# Patient Record
Sex: FEMALE | Race: BLACK OR AFRICAN AMERICAN | Employment: OTHER | URBAN - METROPOLITAN AREA
[De-identification: names, ages, dates, MRNs, and addresses within clinical notes are randomized per-mention and may not be internally consistent; named-entity substitution may affect disease eponyms.]

---

## 2017-01-30 ENCOUNTER — TELEPHONE (OUTPATIENT)
Dept: INTERNAL MEDICINE CLINIC | Facility: CLINIC | Age: 77
End: 2017-01-30

## 2017-01-30 RX ORDER — ATENOLOL 50 MG/1
50 TABLET ORAL DAILY
Qty: 90 TABLET | Refills: 0 | Status: SHIPPED | OUTPATIENT
Start: 2017-01-30 | End: 2017-05-01

## 2017-02-06 ENCOUNTER — TELEPHONE (OUTPATIENT)
Dept: INTERNAL MEDICINE CLINIC | Facility: CLINIC | Age: 77
End: 2017-02-06

## 2017-02-06 RX ORDER — LEVOTHYROXINE SODIUM 0.12 MG/1
125 TABLET ORAL
Qty: 90 TABLET | Refills: 1 | Status: SHIPPED | OUTPATIENT
Start: 2017-02-06 | End: 2017-03-01

## 2017-02-06 RX ORDER — METOCLOPRAMIDE 10 MG/1
TABLET ORAL
Qty: 180 TABLET | Refills: 0 | Status: SHIPPED | OUTPATIENT
Start: 2017-02-06 | End: 2017-02-09 | Stop reason: ALTCHOICE

## 2017-02-06 NOTE — TELEPHONE ENCOUNTER
•  Levothyroxine Sodium 125 MCG Oral Tab, Take 1 tablet (125 mcg total) by mouth before breakfast., Disp: 90 tablet, Rfl: 1  •  •  Metoclopramide HCl 10 MG Oral Tab, TAKE 1 TABLET(10 MG) BY MOUTH TWICE DAILY, Disp: 180 tablet, Rfl: 0  •

## 2017-02-07 ENCOUNTER — TELEPHONE (OUTPATIENT)
Dept: INTERNAL MEDICINE CLINIC | Facility: CLINIC | Age: 77
End: 2017-02-07

## 2017-02-09 ENCOUNTER — OFFICE VISIT (OUTPATIENT)
Dept: INTERNAL MEDICINE CLINIC | Facility: CLINIC | Age: 77
End: 2017-02-09

## 2017-02-09 VITALS
SYSTOLIC BLOOD PRESSURE: 120 MMHG | OXYGEN SATURATION: 96 % | WEIGHT: 159 LBS | TEMPERATURE: 99 F | HEART RATE: 84 BPM | HEIGHT: 64 IN | BODY MASS INDEX: 27.14 KG/M2 | DIASTOLIC BLOOD PRESSURE: 62 MMHG

## 2017-02-09 DIAGNOSIS — E78.2 COMBINED HYPERLIPIDEMIA: ICD-10-CM

## 2017-02-09 DIAGNOSIS — M79.672 LEFT FOOT PAIN: ICD-10-CM

## 2017-02-09 DIAGNOSIS — E89.0 POSTOPERATIVE HYPOTHYROIDISM: ICD-10-CM

## 2017-02-09 DIAGNOSIS — E55.9 VITAMIN D DEFICIENCY: ICD-10-CM

## 2017-02-09 DIAGNOSIS — E53.8 VITAMIN B 12 DEFICIENCY: ICD-10-CM

## 2017-02-09 DIAGNOSIS — I10 ESSENTIAL HYPERTENSION WITH GOAL BLOOD PRESSURE LESS THAN 130/80: Primary | ICD-10-CM

## 2017-02-09 LAB
ALBUMIN SERPL BCP-MCNC: 3.3 G/DL (ref 3.5–4.8)
ALBUMIN/GLOB SERPL: 0.8 {RATIO} (ref 1–2)
ALP SERPL-CCNC: 123 U/L (ref 32–100)
ALT SERPL-CCNC: 10 U/L (ref 14–54)
ANION GAP SERPL CALC-SCNC: 12 MMOL/L (ref 0–18)
AST SERPL-CCNC: 14 U/L (ref 15–41)
BASOPHILS # BLD: 0.1 K/UL (ref 0–0.2)
BASOPHILS NFR BLD: 1 %
BILIRUB SERPL-MCNC: 0.5 MG/DL (ref 0.3–1.2)
BUN SERPL-MCNC: 10 MG/DL (ref 8–20)
BUN/CREAT SERPL: 8.8 (ref 10–20)
CALCIUM SERPL-MCNC: 9.6 MG/DL (ref 8.5–10.5)
CHLORIDE SERPL-SCNC: 98 MMOL/L (ref 95–110)
CO2 SERPL-SCNC: 31 MMOL/L (ref 22–32)
CREAT SERPL-MCNC: 1.14 MG/DL (ref 0.5–1.5)
EOSINOPHIL # BLD: 0.1 K/UL (ref 0–0.7)
EOSINOPHIL NFR BLD: 2 %
ERYTHROCYTE [DISTWIDTH] IN BLOOD BY AUTOMATED COUNT: 14.6 % (ref 11–15)
ERYTHROCYTE [SEDIMENTATION RATE] IN BLOOD: 52 MM/HR (ref 0–30)
GLOBULIN PLAS-MCNC: 3.9 G/DL (ref 2.5–3.7)
GLUCOSE SERPL-MCNC: 85 MG/DL (ref 70–99)
HCT VFR BLD AUTO: 37.9 % (ref 35–48)
HGB BLD-MCNC: 12.4 G/DL (ref 12–16)
LYMPHOCYTES # BLD: 1.5 K/UL (ref 1–4)
LYMPHOCYTES NFR BLD: 20 %
MCH RBC QN AUTO: 28 PG (ref 27–32)
MCHC RBC AUTO-ENTMCNC: 32.8 G/DL (ref 32–37)
MCV RBC AUTO: 85.4 FL (ref 80–100)
MONOCYTES # BLD: 0.4 K/UL (ref 0–1)
MONOCYTES NFR BLD: 5 %
NEUTROPHILS # BLD AUTO: 5.5 K/UL (ref 1.8–7.7)
NEUTROPHILS NFR BLD: 73 %
OSMOLALITY UR CALC.SUM OF ELEC: 290 MOSM/KG (ref 275–295)
PLATELET # BLD AUTO: 230 K/UL (ref 140–400)
PMV BLD AUTO: 9.3 FL (ref 7.4–10.3)
POTASSIUM SERPL-SCNC: 3.3 MMOL/L (ref 3.3–5.1)
PROT SERPL-MCNC: 7.2 G/DL (ref 5.9–8.4)
RBC # BLD AUTO: 4.44 M/UL (ref 3.7–5.4)
SODIUM SERPL-SCNC: 141 MMOL/L (ref 136–144)
TSH SERPL-ACNC: 0.4 UIU/ML (ref 0.34–5.6)
URATE SERPL-MCNC: 8.9 MG/DL (ref 2.1–7.4)
VIT B12 SERPL-MCNC: 308 PG/ML (ref 181–914)
WBC # BLD AUTO: 7.6 K/UL (ref 4–11)

## 2017-02-09 PROCEDURE — 99214 OFFICE O/P EST MOD 30 MIN: CPT | Performed by: INTERNAL MEDICINE

## 2017-02-09 PROCEDURE — 36415 COLL VENOUS BLD VENIPUNCTURE: CPT | Performed by: INTERNAL MEDICINE

## 2017-02-09 PROCEDURE — G0463 HOSPITAL OUTPT CLINIC VISIT: HCPCS | Performed by: INTERNAL MEDICINE

## 2017-02-09 RX ORDER — HYDROCHLOROTHIAZIDE 12.5 MG/1
CAPSULE, GELATIN COATED ORAL
Qty: 90 CAPSULE | Refills: 0 | OUTPATIENT
Start: 2017-02-09 | End: 2017-02-09

## 2017-02-09 RX ORDER — HYDROCHLOROTHIAZIDE 12.5 MG/1
CAPSULE, GELATIN COATED ORAL
Qty: 90 CAPSULE | Refills: 1 | Status: SHIPPED | OUTPATIENT
Start: 2017-02-09 | End: 2017-02-09

## 2017-02-09 RX ORDER — FLUOXETINE HYDROCHLORIDE 20 MG/1
20 CAPSULE ORAL NIGHTLY
Qty: 30 CAPSULE | Refills: 2 | Status: SHIPPED | OUTPATIENT
Start: 2017-02-09 | End: 2017-04-10 | Stop reason: ALTCHOICE

## 2017-02-09 RX ORDER — HYDROCHLOROTHIAZIDE 12.5 MG/1
CAPSULE, GELATIN COATED ORAL
Qty: 90 CAPSULE | Refills: 1 | Status: SHIPPED | OUTPATIENT
Start: 2017-02-09 | End: 2017-09-25

## 2017-02-09 NOTE — PATIENT INSTRUCTIONS
ASSESSMENT/PLAN:   Essential hypertension with goal blood pressure less than 130/80  (primary encounter diagnosis)Good control. Careful with diet and excercise at least 30 minutes 3-4 times a week.  Check blood pressures at different times on different days the other guidelines your healthcare provider has given you. Foods to limit  Eating too many foods containing purines may increase the levels of uric acid in your body and increase your risk for a gout attack.  It may be best to limit these high-purine pancho professional medical care. Always follow your healthcare professional's instructions. Uric Acid (Synovial Fluid)  Does this test have other names?   Synovial fluid analysis   What is this test?  The uric acid test measures levels of uric acid that ca order blood and urine tests to measure uric acid levels. Higher than normal levels of uric acid in the blood or urine can suggest gout.  But the only way your provider can diagnose the condition for sure is by measuring the levels of uric acid in your synov healthcare provider about what to do before having this test. You may need to avoid eating, drinking, or taking certain medicines on the day of the test. Be sure your provider knows about all medicines, herbs, vitamins, and supplements you are taking.  This

## 2017-02-09 NOTE — PROGRESS NOTES
HPI:    Patient ID: Constantine Kim is a 78year old female. HPI Comments: Insomnia. Goes to bed at 10 PM. Sleeps at 12-1 AM. Awakens in 2 hrs. Get book and reading. Not fall asleep. No snoring and no pain. Tried melatonin and not help.      Foot Pain   The Constitutional: Negative for fever, chills, diaphoresis, activity change, appetite change, fatigue and unexpected weight change. Respiratory: Negative for apnea, cough, choking, chest tightness, shortness of breath, wheezing and stridor.     Lalo Jackson [DISCONTINUED] hydrochlorothiazide 12.5 MG Oral Cap TAKE ONE CAPSULE BY MOUTH EACH MORNING Disp: 90 capsule Rfl: 0   [DISCONTINUED] hydrochlorothiazide 12.5 MG Oral Cap TAKE ONE CAPSULE BY MOUTH EACH MORNING Disp: 90 capsule Rfl: 1   [DISCONTINUED] hydroch Dorsalis pedis pulses are 2+ on the right side, and 2+ on the left side. Posterior tibial pulses are 2+ on the right side, and 2+ on the left side. Pulmonary/Chest: Effort normal and breath sounds normal. No accessory muscle usage or stridor. Postoperative hypothyroidism Hot flashes like had prior. Check blood. Vitamin d deficiency Stable 11-16. Combined hyperlipidemia Check blood. Left foot pain Medial L joint. ? Gout. Check blood. Low purine diet. Improving.  Hold stopping HCTZ per

## 2017-02-14 NOTE — PROGRESS NOTES
Quick Note:    CMP Normal (electrolytes, sugar and liver functions), finding kidney function is back to baseline kidney function. Wu phosphatase is still elevated. Recommend add on a GGT. Lipid (choilesterol) is good,   Thyroid is good.    Uric acid is

## 2017-02-15 RX ORDER — ALLOPURINOL 300 MG/1
300 TABLET ORAL DAILY
Qty: 30 TABLET | Refills: 3 | Status: SHIPPED | OUTPATIENT
Start: 2017-02-15 | End: 2017-09-25

## 2017-03-01 ENCOUNTER — TELEPHONE (OUTPATIENT)
Dept: INTERNAL MEDICINE CLINIC | Facility: CLINIC | Age: 77
End: 2017-03-01

## 2017-03-01 RX ORDER — PANTOPRAZOLE SODIUM 40 MG/1
TABLET, DELAYED RELEASE ORAL
Qty: 90 TABLET | Refills: 1 | Status: SHIPPED | OUTPATIENT
Start: 2017-03-01 | End: 2017-09-25

## 2017-03-01 RX ORDER — LEVOTHYROXINE SODIUM 0.12 MG/1
125 TABLET ORAL
Qty: 90 TABLET | Refills: 1 | Status: SHIPPED | OUTPATIENT
Start: 2017-03-01 | End: 2017-05-30

## 2017-03-01 NOTE — TELEPHONE ENCOUNTER
She also want to know if she has to continue all this medications         •  Levothyroxine Sodium 125 MCG Oral Tab, Take 1 tablet (125 mcg total) by mouth before breakfast., Disp: 90 tablet, Rfl: 1- [facility] pharmacy        Pantoprazole Sodium 40 MG Oral Ta

## 2017-03-01 NOTE — TELEPHONE ENCOUNTER
Can try stopping metoclopramide. Then wait 1 week. Try to decrease pantaprozole at qod and then q2 days if can. Rx. Sent to [facility] in [facility] for protonix and for thyroid meds. To [facility].

## 2017-03-02 NOTE — TELEPHONE ENCOUNTER
Called and spoke to patient. Notified of Dr. Wai Hammonds. Patient verbalized understanding and compliance to future plan of care. Patient had no questions at time of call.

## 2017-04-10 ENCOUNTER — TELEPHONE (OUTPATIENT)
Dept: INTERNAL MEDICINE CLINIC | Facility: CLINIC | Age: 77
End: 2017-04-10

## 2017-04-10 ENCOUNTER — OFFICE VISIT (OUTPATIENT)
Dept: INTERNAL MEDICINE CLINIC | Facility: CLINIC | Age: 77
End: 2017-04-10

## 2017-04-10 VITALS
WEIGHT: 152.81 LBS | SYSTOLIC BLOOD PRESSURE: 120 MMHG | HEIGHT: 64 IN | TEMPERATURE: 98 F | HEART RATE: 70 BPM | BODY MASS INDEX: 26.09 KG/M2 | OXYGEN SATURATION: 92 % | DIASTOLIC BLOOD PRESSURE: 56 MMHG

## 2017-04-10 DIAGNOSIS — M10.9 GOUT, UNSPECIFIED CAUSE, UNSPECIFIED CHRONICITY, UNSPECIFIED SITE: ICD-10-CM

## 2017-04-10 DIAGNOSIS — E89.0 POSTOPERATIVE HYPOTHYROIDISM: ICD-10-CM

## 2017-04-10 DIAGNOSIS — R50.9 FEVER, UNSPECIFIED FEVER CAUSE: ICD-10-CM

## 2017-04-10 DIAGNOSIS — E55.9 VITAMIN D DEFICIENCY: ICD-10-CM

## 2017-04-10 DIAGNOSIS — R11.0 NAUSEA: ICD-10-CM

## 2017-04-10 DIAGNOSIS — E78.2 COMBINED HYPERLIPIDEMIA: ICD-10-CM

## 2017-04-10 DIAGNOSIS — I10 ESSENTIAL HYPERTENSION WITH GOAL BLOOD PRESSURE LESS THAN 130/80: Primary | ICD-10-CM

## 2017-04-10 DIAGNOSIS — R82.90 ABNORMAL URINE: ICD-10-CM

## 2017-04-10 DIAGNOSIS — G47.00 INSOMNIA, UNSPECIFIED TYPE: ICD-10-CM

## 2017-04-10 DIAGNOSIS — E53.8 VITAMIN B 12 DEFICIENCY: ICD-10-CM

## 2017-04-10 PROCEDURE — G0463 HOSPITAL OUTPT CLINIC VISIT: HCPCS | Performed by: INTERNAL MEDICINE

## 2017-04-10 PROCEDURE — 81003 URINALYSIS AUTO W/O SCOPE: CPT | Performed by: INTERNAL MEDICINE

## 2017-04-10 PROCEDURE — 36415 COLL VENOUS BLD VENIPUNCTURE: CPT | Performed by: INTERNAL MEDICINE

## 2017-04-10 PROCEDURE — 99215 OFFICE O/P EST HI 40 MIN: CPT | Performed by: INTERNAL MEDICINE

## 2017-04-10 RX ORDER — ONDANSETRON 4 MG/1
4 TABLET, FILM COATED ORAL EVERY 8 HOURS PRN
Qty: 20 TABLET | Refills: 0 | Status: SHIPPED | OUTPATIENT
Start: 2017-04-10 | End: 2017-04-21 | Stop reason: ALTCHOICE

## 2017-04-10 RX ORDER — OSELTAMIVIR PHOSPHATE 75 MG/1
75 CAPSULE ORAL 2 TIMES DAILY
Qty: 10 CAPSULE | Refills: 0 | Status: SHIPPED | OUTPATIENT
Start: 2017-04-10 | End: 2017-04-10 | Stop reason: CLARIF

## 2017-04-10 RX ORDER — ZOLPIDEM TARTRATE 10 MG/1
10 TABLET ORAL NIGHTLY PRN
Qty: 20 TABLET | Refills: 0 | Status: SHIPPED | OUTPATIENT
Start: 2017-04-10 | End: 2018-04-05

## 2017-04-10 RX ORDER — ESCITALOPRAM OXALATE 10 MG/1
10 TABLET ORAL NIGHTLY
Qty: 30 TABLET | Refills: 1 | Status: SHIPPED | OUTPATIENT
Start: 2017-04-10 | End: 2017-04-10

## 2017-04-10 RX ORDER — ESCITALOPRAM OXALATE 10 MG/1
10 TABLET ORAL NIGHTLY
Qty: 90 TABLET | Refills: 0 | Status: SHIPPED | OUTPATIENT
Start: 2017-04-10 | End: 2017-07-28

## 2017-04-10 NOTE — PROGRESS NOTES
HPI:    Patient ID: Dee Reyes is a 78year old female. HPI Comments: No travel. No bites nor rashes. Fever yesterday 100. Denies URI Sx. Sweats last night. Ear Pain   There is pain in the left ear. This is a new problem.  The current episode star Anxiety  This is a new problem. The current episode started more than 1 month ago (Saw PCP 4 yrs Refused counselor. ). Associated symptoms include abdominal pain and nausea.  Pertinent negatives include no change in bowel habit, chest pain, chills, conges pressure, sneezing, sore throat, tinnitus, trouble swallowing and voice change. Eyes: Negative for photophobia, pain, discharge, redness, itching and visual disturbance.    Respiratory: Negative for apnea, cough, choking, chest tightness, shortness of br Disp: 10 capsule Rfl: 0   Levothyroxine Sodium 125 MCG Oral Tab Take 1 tablet (125 mcg total) by mouth before breakfast. Disp: 90 tablet Rfl: 1   Pantoprazole Sodium 40 MG Oral Tab EC TAKE 1 TABLET BY MOUTH EVERY MORNING BEFORE BREAKFAST PRN Disp: 90 table Alcohol Use: Yes                Comment: occ       PHYSICAL EXAM:    Physical Exam   Constitutional: She is oriented to person, place, and time. She appears well-developed and well-nourished. No distress.    HENT:   Right Ear: Tympanic membrane, external ea thyromegaly present. Cardiovascular: Normal rate, regular rhythm, S1 normal, S2 normal, normal heart sounds, intact distal pulses and normal pulses. Pulses:       Carotid pulses are 2+ on the right side, and 2+ on the left side.        Radial pulses ar Coordination and gait normal.   Skin: Skin is warm and dry. She is not diaphoretic. Psychiatric: She has a normal mood and affect. Her speech is normal and behavior is normal. Judgment and thought content normal.   Nursing note and vitals reviewed. total) by mouth nightly. Zolpidem Tartrate 10 MG Oral Tab 20 tablet 0      Sig: Take 1 tablet (10 mg total) by mouth nightly as needed for Sleep.       Ondansetron HCl (ZOFRAN) 4 mg tablet 20 tablet 0      Sig: Take 1 tablet (4 mg total) by mouth every

## 2017-04-10 NOTE — TELEPHONE ENCOUNTER
Left ear ringing and hurting. Nausea and stomach problem. Patient states no fever today but did have one last night.   Scheduled same day appointment at 1 pm.

## 2017-04-10 NOTE — PATIENT INSTRUCTIONS
ASSESSMENT/PLAN:   Essential hypertension with goal blood pressure less than 130/80  (primary encounter diagnosis) Good control. Careful with diet and excercise at least 30 minutes 3-4 times a week.  Check blood pressures at different times on different day (eight) hours as needed for Nausea. Oseltamivir Phosphate 75 MG Oral Cap 10 capsule 0      Sig: Take 1 capsule (75 mg total) by mouth 2 (two) times daily.            Imaging & Referrals:  XR CHEST PA + LAT CHEST (CPT=[id number])

## 2017-04-12 DIAGNOSIS — E87.6 LOW BLOOD POTASSIUM: Primary | ICD-10-CM

## 2017-04-12 NOTE — PROGRESS NOTES
Quick Note:    CBC Normal (white blood cells and red blood cells and platelets),   CMP Normal (electrolytes, sugar, kidney and liver functions), potassium is very low.  Would recommend potassium 40 mEq twice a day and check basic metabolic at the end of thi

## 2017-04-17 ENCOUNTER — TELEPHONE (OUTPATIENT)
Dept: INTERNAL MEDICINE CLINIC | Facility: CLINIC | Age: 77
End: 2017-04-17

## 2017-04-17 NOTE — TELEPHONE ENCOUNTER
Received form from [employer] regarding Rx for Odansetron. Completed form and pending Dr. Edwina Porter review and ok to fax. Placed on Dr. Edwina Porter desk.

## 2017-04-19 ENCOUNTER — TELEPHONE (OUTPATIENT)
Dept: INTERNAL MEDICINE CLINIC | Facility: CLINIC | Age: 77
End: 2017-04-19

## 2017-04-19 RX ORDER — PROMETHAZINE HYDROCHLORIDE 25 MG/1
25 TABLET ORAL EVERY 8 HOURS PRN
Qty: 30 TABLET | Refills: 0 | Status: SHIPPED | OUTPATIENT
Start: 2017-04-19 | End: 2017-04-21 | Stop reason: ALTCHOICE

## 2017-04-20 NOTE — TELEPHONE ENCOUNTER
Rx has been faxed to maurice in [facility], pt informed.

## 2017-04-21 RX ORDER — PROCHLORPERAZINE MALEATE 10 MG
10 TABLET ORAL EVERY 8 HOURS PRN
Qty: 20 TABLET | Refills: 0 | OUTPATIENT
Start: 2017-04-21 | End: 2017-04-24

## 2017-04-21 NOTE — TELEPHONE ENCOUNTER
Visit can be closed only when message is complete.   The only other option would be to try Compazine 10 p.o. q. 8 as needed we have been through already to antinausea medications and neither one is covered so I am not sure what to do with insurance and I wa

## 2017-04-24 RX ORDER — PROCHLORPERAZINE MALEATE 10 MG
10 TABLET ORAL EVERY 8 HOURS PRN
Qty: 300 TABLET | Refills: 0 | Status: ON HOLD | OUTPATIENT
Start: 2017-04-24 | End: 2020-02-06

## 2017-05-01 RX ORDER — BENAZEPRIL HYDROCHLORIDE 40 MG/1
TABLET, FILM COATED ORAL
Qty: 180 TABLET | Refills: 0 | Status: SHIPPED | OUTPATIENT
Start: 2017-05-01 | End: 2017-09-25

## 2017-05-01 RX ORDER — ATENOLOL 50 MG/1
50 TABLET ORAL DAILY
Qty: 90 TABLET | Refills: 0 | Status: SHIPPED | OUTPATIENT
Start: 2017-05-01 | End: 2017-07-31

## 2017-05-30 ENCOUNTER — TELEPHONE (OUTPATIENT)
Dept: INTERNAL MEDICINE CLINIC | Facility: CLINIC | Age: 77
End: 2017-05-30

## 2017-05-30 RX ORDER — LEVOTHYROXINE SODIUM 0.12 MG/1
125 TABLET ORAL
Qty: 90 TABLET | Refills: 0 | Status: SHIPPED | OUTPATIENT
Start: 2017-05-30 | End: 2017-08-28

## 2017-05-30 NOTE — TELEPHONE ENCOUNTER
Needs refill     Current outpatient prescriptions:   •  Levothyroxine Sodium 125 MCG Oral Tab, Take 1 tablet (125 mcg total) by mouth before breakfast., Disp: 90 tablet, Rfl: 1  •

## 2017-06-07 ENCOUNTER — OFFICE VISIT (OUTPATIENT)
Dept: INTERNAL MEDICINE CLINIC | Facility: CLINIC | Age: 77
End: 2017-06-07

## 2017-06-07 VITALS
BODY MASS INDEX: 25.61 KG/M2 | WEIGHT: 150 LBS | HEART RATE: 77 BPM | TEMPERATURE: 99 F | DIASTOLIC BLOOD PRESSURE: 65 MMHG | SYSTOLIC BLOOD PRESSURE: 102 MMHG | HEIGHT: 64 IN

## 2017-06-07 DIAGNOSIS — R27.0 ATAXIA: Primary | ICD-10-CM

## 2017-06-07 DIAGNOSIS — R25.2 JERKING MOVEMENTS OF EXTREMITIES: ICD-10-CM

## 2017-06-07 PROCEDURE — 99213 OFFICE O/P EST LOW 20 MIN: CPT | Performed by: INTERNAL MEDICINE

## 2017-06-07 PROCEDURE — G0463 HOSPITAL OUTPT CLINIC VISIT: HCPCS | Performed by: INTERNAL MEDICINE

## 2017-06-07 NOTE — PROGRESS NOTES
HPI:    Patient ID: Dyllan Ashford is a 78year old female. HPI she came in today complaining of a falls . She states that since April on and off she has  jerking movement on her left leg and then she fels on the ground.  She denies any seizure-like activit breakfast. Disp: 90 tablet Rfl: 0   BENAZEPRIL HCL 40 MG Oral Tab TAKE 1 TABLET(40 MG) BY MOUTH TWICE DAILY Disp: 180 tablet Rfl: 0   atenolol 50 MG Oral Tab Take 1 tablet (50 mg total) by mouth daily.  Disp: 90 tablet Rfl: 0   Prochlorperazine Maleate (COM Onset   • Hypertension Mother    • Heart Disease Mother 36     CAD S/P MI.    • Cancer Brother      Lung cancer. Construction.        Social History:   Smoking Status: Never Smoker                      Smokeless Status: Never Used                        Alc There is no hepatosplenomegaly. There is no tenderness. There is no rigidity, no rebound, no guarding and no CVA tenderness. Musculoskeletal: Normal range of motion. She exhibits no edema. Lymphadenopathy:     She has no cervical adenopathy.      She ha

## 2017-06-07 NOTE — PATIENT INSTRUCTIONS
Jerking movements of extremities-  Left leg , not able to controll,etiology unclear , ? Seizure - previous ct shows small lacunar infarcts, will order ct head , eeg ,will refer her to neurology advised her not to drive until then,if happens again, go to er

## 2017-06-12 ENCOUNTER — HOSPITAL ENCOUNTER (OUTPATIENT)
Dept: CT IMAGING | Facility: HOSPITAL | Age: 77
Discharge: HOME | End: 2017-06-12
Attending: INTERNAL MEDICINE
Payer: MEDICARE

## 2017-06-12 DIAGNOSIS — R27.0 ATAXIA: ICD-10-CM

## 2017-06-12 PROCEDURE — 70450 CT HEAD/BRAIN W/O DYE: CPT | Performed by: INTERNAL MEDICINE

## 2017-06-26 ENCOUNTER — TELEPHONE (OUTPATIENT)
Dept: INTERNAL MEDICINE CLINIC | Facility: CLINIC | Age: 77
End: 2017-06-26

## 2017-06-26 NOTE — TELEPHONE ENCOUNTER
Daughter states pt is shaking a lot, has to hold herself up, losing balance. Asking to speak with a nurse.

## 2017-06-27 NOTE — TELEPHONE ENCOUNTER
Spoke with patient's son Kalyani Maurice states patient has been having what he thinks are seizures. Patient shaking a lot like a seizure and then she falls. Keisha Mcdaniel this is not new has been going on for a couple months. Saw Dr. Carly Connors for this complaint.   Had CT sc

## 2017-06-27 NOTE — TELEPHONE ENCOUNTER
/65. With dizziness today. Aware pcp is not in the office anymore, advised daughter that if symptoms get worse to take patient to ER. Daughter verbalized understanding.

## 2017-06-27 NOTE — TELEPHONE ENCOUNTER
Spoke with Jd Garcia, stated that loss of balance and shaking a lot has been going on for a while now but worse today. Does not have any appetite and does not eat much.  Per daughter patient approximately drinks 24 oz of water/day and drinks more soda than [place]

## 2017-06-28 ENCOUNTER — OFFICE VISIT (OUTPATIENT)
Dept: INTERNAL MEDICINE CLINIC | Facility: CLINIC | Age: 77
End: 2017-06-28

## 2017-06-28 VITALS
SYSTOLIC BLOOD PRESSURE: 95 MMHG | DIASTOLIC BLOOD PRESSURE: 61 MMHG | HEIGHT: 64 IN | BODY MASS INDEX: 25.44 KG/M2 | HEART RATE: 77 BPM | WEIGHT: 149 LBS | TEMPERATURE: 100 F

## 2017-06-28 DIAGNOSIS — R25.2 JERKING MOVEMENTS OF EXTREMITIES: Primary | ICD-10-CM

## 2017-06-28 DIAGNOSIS — R42 DIZZINESS: ICD-10-CM

## 2017-06-28 PROCEDURE — G0463 HOSPITAL OUTPT CLINIC VISIT: HCPCS | Performed by: INTERNAL MEDICINE

## 2017-06-28 PROCEDURE — 99213 OFFICE O/P EST LOW 20 MIN: CPT | Performed by: INTERNAL MEDICINE

## 2017-06-28 NOTE — PATIENT INSTRUCTIONS
erking movements of extremities  (primary encounter diagnosis) ? Partial seizure , ct reviewed , chronic lacunar infarcts, - will order mri of the brain , eeg , carotid doppler, she has edward with neurology on Friday . If any change in mental status, loss of

## 2017-06-28 NOTE — TELEPHONE ENCOUNTER
Spoke with son and he was informed that pt needs to be seen for evaluation, offered appt with Dr. Perla Silva but he refused since Dr. Celina Padron knows pt's history. He is insisting Dr. Celina Padron see pt. When can we book pt for?

## 2017-06-28 NOTE — PROGRESS NOTES
HPI:    Patient ID: Maryuri Corona is a 78year old female. HPI she came I today with complains of  Jerking movent of her leg and fall .  She states that thus Is ongoing for few months, it happen randomly she states that  She feels that is going to happen, Prescriptions:  Levothyroxine Sodium 125 MCG Oral Tab Take 1 tablet (125 mcg total) by mouth before breakfast. Disp: 90 tablet Rfl: 0   BENAZEPRIL HCL 40 MG Oral Tab TAKE 1 TABLET(40 MG) BY MOUTH TWICE DAILY Disp: 180 tablet Rfl: 0   atenolol 50 MG Oral Ta Total thyroidectomy. Smnall focus of papillary               carcinoma but LN -. Family History   Problem Relation Age of Onset   • Hypertension Mother    • Heart Disease Mother 36     CAD S/P MI.    • Cancer Brother      Lung cancer. Construction. wheezes. She has no rales. She exhibits no tenderness. Abdominal: Soft. Bowel sounds are normal. She exhibits no shifting dullness, no distension and no mass. There is no hepatosplenomegaly. There is no tenderness.  There is no rigidity, no rebound, no gu

## 2017-06-30 ENCOUNTER — OFFICE VISIT (OUTPATIENT)
Dept: NEUROLOGY | Facility: CLINIC | Age: 77
End: 2017-06-30

## 2017-06-30 ENCOUNTER — TELEPHONE (OUTPATIENT)
Dept: NEUROLOGY | Facility: CLINIC | Age: 77
End: 2017-06-30

## 2017-06-30 VITALS
DIASTOLIC BLOOD PRESSURE: 68 MMHG | WEIGHT: 149 LBS | BODY MASS INDEX: 25.44 KG/M2 | HEIGHT: 64 IN | SYSTOLIC BLOOD PRESSURE: 120 MMHG

## 2017-06-30 DIAGNOSIS — R25.9 ABNORMAL INVOLUNTARY MOVEMENTS: Primary | ICD-10-CM

## 2017-06-30 PROCEDURE — 99204 OFFICE O/P NEW MOD 45 MIN: CPT | Performed by: OTHER

## 2017-06-30 NOTE — TELEPHONE ENCOUNTER
Pt. informed insurance was verified and MRI brain w/wo is a covered benefit and does not require authorization. Can proceed with scheduling appt.

## 2017-06-30 NOTE — PATIENT INSTRUCTIONS
As of October 6th 2014, the Drug Enforcement Agency [facility]) is reclassifying all hydrocodone combination medications from Schedule III to Schedule II. This includes medications such as Norco, Vicodin, Lortab, Zohydro, and Vicoprofen.     What this means for y

## 2017-06-30 NOTE — PROGRESS NOTES
Neurology Outpatient Consult Note    Dariel Gonzales : 1940   Referring Physician: Dr. Sloan Felty  HPI:     Dariel Gonzales is a 78year old female who is being seen in neurologic evaluation.     Patient is being seen in evaluation for abnormal involunt appearing lacunar infarcts in the right and left internal capsule/basal     ganglia region. Moderate chronic appearing deep white matter ischemic     change in the periventricular white matter bilaterally.  No intracranial     hemorrhage, mass or focal infa • Migraine    • Other and unspecified hyperlipidemia    • Unspecified essential hypertension       Past Surgical History:  No date: CHOLECYSTECTOMY  No date: OTHER SURGICAL HISTORY      Comment: LASER SURGERY ON GALL BLADDER STONES  8/31/15: Vish Bundy Sitting, Cuff Size: adult)   Ht 64\"   Wt 149 lb   BMI 25.58 kg/m²    General: no apparent distress, pleasant and cooperative   CV: regular rate and rhythm   Lungs: clear to auscultation bilaterally  Neuro:  Mental Status: alert, speech fluent and appropri

## 2017-07-11 ENCOUNTER — HOSPITAL ENCOUNTER (OUTPATIENT)
Dept: ULTRASOUND IMAGING | Facility: HOSPITAL | Age: 77
Discharge: HOME | End: 2017-07-11
Attending: INTERNAL MEDICINE
Payer: MEDICARE

## 2017-07-11 ENCOUNTER — HOSPITAL ENCOUNTER (OUTPATIENT)
Dept: MRI IMAGING | Facility: HOSPITAL | Age: 77
Discharge: HOME | End: 2017-07-11
Attending: INTERNAL MEDICINE
Payer: MEDICARE

## 2017-07-11 ENCOUNTER — HOSPITAL ENCOUNTER (OUTPATIENT)
Dept: ELECTROPHYSIOLOGY | Facility: HOSPITAL | Age: 77
Discharge: HOME | End: 2017-07-11
Attending: INTERNAL MEDICINE
Payer: MEDICARE

## 2017-07-11 DIAGNOSIS — R42 DIZZINESS: ICD-10-CM

## 2017-07-11 DIAGNOSIS — R25.2 JERKING MOVEMENTS OF EXTREMITIES: ICD-10-CM

## 2017-07-11 PROCEDURE — 70551 MRI BRAIN STEM W/O DYE: CPT | Performed by: INTERNAL MEDICINE

## 2017-07-11 PROCEDURE — 93880 EXTRACRANIAL BILAT STUDY: CPT | Performed by: INTERNAL MEDICINE

## 2017-07-11 PROCEDURE — 95816 EEG AWAKE AND DROWSY: CPT | Performed by: OTHER

## 2017-07-12 NOTE — PROCEDURES
[address]  [facility], [address]      PATIENT'S NAME: Gardenia Awad   ATTENDING PHYSICIAN: Amanda Villa MD   [facility] ACC[facility]: [facility]de-identified[facility]   [facility]   MEDICAL RECORD #: [id number] YOB: 1940

## 2017-07-13 NOTE — ADDENDUM NOTE
Encounter addended by: Carolynn Landau, MD on: 7/13/2017  8:28 AM<BR>    Actions taken: Charge Capture section accepted

## 2017-07-24 RX ORDER — SIMVASTATIN 20 MG
TABLET ORAL
Qty: 90 TABLET | Refills: 0 | Status: SHIPPED | OUTPATIENT
Start: 2017-07-24 | End: 2017-09-25

## 2017-07-28 ENCOUNTER — OFFICE VISIT (OUTPATIENT)
Dept: NEUROLOGY | Facility: CLINIC | Age: 77
End: 2017-07-28

## 2017-07-28 VITALS — BODY MASS INDEX: 25.44 KG/M2 | WEIGHT: 149 LBS | HEIGHT: 64 IN

## 2017-07-28 DIAGNOSIS — R25.9 ABNORMAL INVOLUNTARY MOVEMENTS: Primary | ICD-10-CM

## 2017-07-28 DIAGNOSIS — R90.89 ABNORMAL BRAIN MRI: ICD-10-CM

## 2017-07-28 DIAGNOSIS — R42 DIZZINESS: ICD-10-CM

## 2017-07-28 PROCEDURE — 99213 OFFICE O/P EST LOW 20 MIN: CPT | Performed by: OTHER

## 2017-07-28 NOTE — PATIENT INSTRUCTIONS
Twelve Simple Tips to Improve Your Sleep    Falling asleep may seem like an impossible dream when you’re awake at 3 a.m., but good sleep is more under your control than you might think.  Following healthy sleep habits can make the difference between restl 60 and 75°F—and the room well ventilated. And make sure your bedroom is equipped with a comfortable mattress and pillows. (Remember that most mattresses wear out after ten years. )    Also, if a pet regularly wakes you during the night, you may want to cons bright light can stimulate your internal clock. When your eyelids are drooping and you are ready to sleep, return to bed. #6 Use Light to Your Advantage    Natural light keeps your internal clock on a healthy sleep-wake cycle.  So let in the light first sleep more soundly—as long as it's done at the right time. Exercise stimulates the body to secrete the stress hormone cortisol, which helps activate the alerting mechanism in the brain. This is fine, unless you're trying to fall asleep.  Try to finish exerc

## 2017-07-30 NOTE — PROGRESS NOTES
Neurology Outpatient progress note    Tomer Nieves : 1940   HPI:     Tomer Nieves is a 78year old female who is being seen in neurologic follow-up. I saw her in clinic last 17. She is accompanied by her son to the visit today.     Since last Disp: 20 tablet Rfl: 0   Pantoprazole Sodium 40 MG Oral Tab EC TAKE 1 TABLET BY MOUTH EVERY MORNING BEFORE BREAKFAST PRN Disp: 90 tablet Rfl: 1   allopurinol 300 MG Oral Tab Take 1 tablet (300 mg total) by mouth daily.  Disp: 30 tablet Rfl: 3   hydrochlorot Comment:lost 20 lb in in 3-4 month  Special Diet            No  Back Care               No  Exercise                Yes    Comment:cardio  Bike Helmet             No  Seat Belt               Yes  Self-Exams              Yes          ROS:   GENERAL: no fev

## 2017-07-31 RX ORDER — ATENOLOL 50 MG/1
50 TABLET ORAL DAILY
Qty: 90 TABLET | Refills: 0 | Status: SHIPPED | OUTPATIENT
Start: 2017-07-31 | End: 2018-03-12

## 2017-08-04 RX ORDER — HYDROCHLOROTHIAZIDE 12.5 MG/1
CAPSULE, GELATIN COATED ORAL
Qty: 90 CAPSULE | Refills: 0 | Status: SHIPPED | OUTPATIENT
Start: 2017-08-04 | End: 2017-09-25

## 2017-08-08 RX ORDER — BENAZEPRIL HYDROCHLORIDE 40 MG/1
TABLET, FILM COATED ORAL
Qty: 180 TABLET | Refills: 0 | Status: SHIPPED | OUTPATIENT
Start: 2017-08-08 | End: 2018-03-12

## 2017-08-09 ENCOUNTER — TELEPHONE (OUTPATIENT)
Dept: INTERNAL MEDICINE CLINIC | Facility: CLINIC | Age: 77
End: 2017-08-09

## 2017-08-10 NOTE — TELEPHONE ENCOUNTER
Called pharmacy and informed them of  Dr. Damir Beasley orders. Verbalized understanding. No questions or concerns at the time of call.

## 2017-08-10 NOTE — TELEPHONE ENCOUNTER
Pharmacy states atenolol 50 mg is on back order can they dispense the 25 mg? Pt will take 2 tabs daily. Please advise.

## 2017-08-28 RX ORDER — LEVOTHYROXINE SODIUM 0.12 MG/1
125 TABLET ORAL
Qty: 90 TABLET | Refills: 0 | Status: SHIPPED | OUTPATIENT
Start: 2017-08-28 | End: 2017-09-25

## 2017-09-25 ENCOUNTER — OFFICE VISIT (OUTPATIENT)
Dept: INTERNAL MEDICINE CLINIC | Facility: CLINIC | Age: 77
End: 2017-09-25

## 2017-09-25 ENCOUNTER — TELEPHONE (OUTPATIENT)
Dept: INTERNAL MEDICINE CLINIC | Facility: CLINIC | Age: 77
End: 2017-09-25

## 2017-09-25 VITALS
TEMPERATURE: 98 F | DIASTOLIC BLOOD PRESSURE: 61 MMHG | HEIGHT: 64 IN | OXYGEN SATURATION: 97 % | BODY MASS INDEX: 26.46 KG/M2 | HEART RATE: 76 BPM | WEIGHT: 155 LBS | SYSTOLIC BLOOD PRESSURE: 100 MMHG

## 2017-09-25 DIAGNOSIS — G89.29 CHRONIC RIGHT SHOULDER PAIN: ICD-10-CM

## 2017-09-25 DIAGNOSIS — E53.8 VITAMIN B 12 DEFICIENCY: ICD-10-CM

## 2017-09-25 DIAGNOSIS — M10.9 GOUT, UNSPECIFIED CAUSE, UNSPECIFIED CHRONICITY, UNSPECIFIED SITE: ICD-10-CM

## 2017-09-25 DIAGNOSIS — G47.00 INSOMNIA, UNSPECIFIED TYPE: ICD-10-CM

## 2017-09-25 DIAGNOSIS — E78.2 COMBINED HYPERLIPIDEMIA: ICD-10-CM

## 2017-09-25 DIAGNOSIS — E55.9 VITAMIN D DEFICIENCY: Primary | ICD-10-CM

## 2017-09-25 DIAGNOSIS — M85.80 OSTEOPENIA, UNSPECIFIED LOCATION: ICD-10-CM

## 2017-09-25 DIAGNOSIS — E89.0 POSTOPERATIVE HYPOTHYROIDISM: ICD-10-CM

## 2017-09-25 DIAGNOSIS — I10 ESSENTIAL HYPERTENSION WITH GOAL BLOOD PRESSURE LESS THAN 130/80: ICD-10-CM

## 2017-09-25 DIAGNOSIS — M25.511 CHRONIC RIGHT SHOULDER PAIN: ICD-10-CM

## 2017-09-25 DIAGNOSIS — R25.2 JERKING MOVEMENTS OF EXTREMITIES: ICD-10-CM

## 2017-09-25 LAB
ALBUMIN SERPL BCP-MCNC: 3.2 G/DL (ref 3.5–4.8)
ALBUMIN/GLOB SERPL: 0.8 {RATIO} (ref 1–2)
ALP SERPL-CCNC: 116 U/L (ref 32–100)
ALT SERPL-CCNC: 9 U/L (ref 14–54)
ANION GAP SERPL CALC-SCNC: 8 MMOL/L (ref 0–18)
AST SERPL-CCNC: 18 U/L (ref 15–41)
BILIRUB SERPL-MCNC: 0.4 MG/DL (ref 0.3–1.2)
BUN SERPL-MCNC: 11 MG/DL (ref 8–20)
BUN/CREAT SERPL: 10.1 (ref 10–20)
CALCIUM SERPL-MCNC: 9.3 MG/DL (ref 8.5–10.5)
CHLORIDE SERPL-SCNC: 105 MMOL/L (ref 95–110)
CHOLEST SERPL-MCNC: 170 MG/DL (ref 110–200)
CO2 SERPL-SCNC: 26 MMOL/L (ref 22–32)
CREAT SERPL-MCNC: 1.09 MG/DL (ref 0.5–1.5)
GLOBULIN PLAS-MCNC: 3.9 G/DL (ref 2.5–3.7)
GLUCOSE SERPL-MCNC: 97 MG/DL (ref 70–99)
HDLC SERPL-MCNC: 34 MG/DL
LDLC SERPL CALC-MCNC: 93 MG/DL (ref 0–99)
NONHDLC SERPL-MCNC: 136 MG/DL
OSMOLALITY UR CALC.SUM OF ELEC: 287 MOSM/KG (ref 275–295)
POTASSIUM SERPL-SCNC: 3.8 MMOL/L (ref 3.3–5.1)
PROT SERPL-MCNC: 7.1 G/DL (ref 5.9–8.4)
SODIUM SERPL-SCNC: 139 MMOL/L (ref 136–144)
T4 FREE SERPL-MCNC: 1.23 NG/DL (ref 0.58–1.64)
TRIGL SERPL-MCNC: 217 MG/DL (ref 1–149)
TSH SERPL-ACNC: 0.02 UIU/ML (ref 0.45–5.33)
URATE SERPL-MCNC: 9.4 MG/DL (ref 2.1–7.4)
VIT B12 SERPL-MCNC: 186 PG/ML (ref 181–914)

## 2017-09-25 PROCEDURE — 36415 COLL VENOUS BLD VENIPUNCTURE: CPT | Performed by: INTERNAL MEDICINE

## 2017-09-25 PROCEDURE — 99214 OFFICE O/P EST MOD 30 MIN: CPT | Performed by: INTERNAL MEDICINE

## 2017-09-25 PROCEDURE — G0008 ADMIN INFLUENZA VIRUS VAC: HCPCS | Performed by: INTERNAL MEDICINE

## 2017-09-25 PROCEDURE — 90653 IIV ADJUVANT VACCINE IM: CPT | Performed by: INTERNAL MEDICINE

## 2017-09-25 PROCEDURE — G0463 HOSPITAL OUTPT CLINIC VISIT: HCPCS | Performed by: INTERNAL MEDICINE

## 2017-09-25 RX ORDER — LEVOTHYROXINE SODIUM 0.12 MG/1
125 TABLET ORAL
Qty: 90 TABLET | Refills: 0 | Status: SHIPPED | OUTPATIENT
Start: 2017-09-25 | End: 2017-11-28

## 2017-09-25 RX ORDER — ATENOLOL 25 MG/1
TABLET ORAL
Refills: 0 | COMMUNITY
Start: 2017-08-10 | End: 2017-11-09

## 2017-09-25 RX ORDER — SIMVASTATIN 20 MG
TABLET ORAL
Qty: 90 TABLET | Refills: 0 | Status: SHIPPED | OUTPATIENT
Start: 2017-09-25 | End: 2018-03-12

## 2017-09-25 RX ORDER — TIZANIDINE HYDROCHLORIDE 4 MG/1
4 CAPSULE, GELATIN COATED ORAL NIGHTLY
Qty: 30 CAPSULE | Refills: 0 | Status: SHIPPED | OUTPATIENT
Start: 2017-09-25 | End: 2017-09-25

## 2017-09-25 RX ORDER — TIZANIDINE 4 MG/1
4 TABLET ORAL NIGHTLY
Qty: 30 TABLET | Refills: 0 | Status: SHIPPED | OUTPATIENT
Start: 2017-09-25 | End: 2018-09-18 | Stop reason: ALTCHOICE

## 2017-09-25 RX ORDER — HYDROCHLOROTHIAZIDE 12.5 MG/1
CAPSULE, GELATIN COATED ORAL
Qty: 90 CAPSULE | Refills: 0 | Status: SHIPPED | OUTPATIENT
Start: 2017-09-25 | End: 2018-02-12

## 2017-09-25 RX ORDER — PANTOPRAZOLE SODIUM 40 MG/1
TABLET, DELAYED RELEASE ORAL
Qty: 90 TABLET | Refills: 1 | Status: SHIPPED | OUTPATIENT
Start: 2017-09-25 | End: 2018-07-30

## 2017-09-25 RX ORDER — ALLOPURINOL 300 MG/1
300 TABLET ORAL DAILY
Qty: 30 TABLET | Refills: 3 | Status: SHIPPED | OUTPATIENT
Start: 2017-09-25 | End: 2018-10-31

## 2017-09-25 NOTE — PATIENT INSTRUCTIONS
ASSESSMENT/PLAN:   Vitamin d deficiency  (primary encounter diagnosis)  Check blood. Combined hyperlipidemia Check blood. Essential hypertension with goal blood pressure less than 130/80 Good control.  Careful with diet and excercise at least 30 min Imaging & Referrals:  FLU VACCINE ADJUVANT IM  US THYROID (ZHH=[id number])   Exercises for Shoulder Flexibility: External Rotation  This stretch can help restore shoulder flexibility and relieve pain over time.  When stretching, be sure to breathe deeply seated, move the arm on the side you want to stretch toward the middle of your back. The palm of your hand should face out. 6. Cup your other hand under the hand that’s behind your back.  Gently push your cupped hand upward until you feel the stretch in th the stretch for 5 seconds. 10. Work up to [employer] of this stretch, 3 times a day. Work up to holding the stretch for 30 to 60 seconds. Note: Be sure to push your elbow across your chest, not up toward your chin.  Over time, try to push your elbow far your healthcare provider before starting an exercise program.   Date Last Reviewed: 8/26/2015  © 2434-6915 [address], [address] [place], [place] [address]. All rights reserved.  This information is not intended as a substitute for diane gout?  With a little effort, you may be able to prevent gout attacks in the future.  Here are some things you can do:  · Avoid foods high in purines  ¨ Certain meats (red meat, processed meat, turkey)  ¨ Organ meats (kidney, liver, sweetbread)  ¨ Shellfish syrup  · Certain fish, including anchovies, sardines, fish eggs, and herring  · Shellfish  · Certain meats, such as red meat, hot dogs, luncheon meats, and turkey  · Organ meats, such as liver, kidneys, and sweetbreads  · Legumes, such as dried beans and p form of arthritis caused by an excess of uric acid. This is a waste product made by the body. It builds up in the body and forms crystals that collect in the joints, bringing on a gout attack. Alcohol and certain foods can trigger a gout attack.  Below are gout. Your diet should be:  · High in fiber, whole grains, fruits, and vegetables. · Low in protein (15% of calories should come from protein. Choose lean sources such as soy, lean meats, and poultry).   · Low in fat (no more than 30% of calories should co (kidney, liver, sweetbread)  · Shellfish (lobster, crab, shrimp, scallop, mussel)  · Certain fish (anchovy, sardine, herring, mackerel)   Treatment  · Lifestyle changes, including weight loss, exercise, and quitting tobacco use  · Reducing consumption of t shoulder, this stretch may cause discomfort, especially when you first get started. A few months may pass before you achieve the results you want. But once your shoulder heals, it rarely becomes frozen again.  So stick to your stretching program. If you hav

## 2017-09-25 NOTE — TELEPHONE ENCOUNTER
Can you change Tizanidine to Tablets instead of capsules, to see if covered by insurance ? Otherwise will need prior auth  Thank you.

## 2017-09-25 NOTE — PROGRESS NOTES
HPI:    Patient ID: Fabricio Navarrete is a 78year old female. Goes to bed at 930 PM. Goes to sleep at 3 Am and awakens at 630 AM. Awakens 3 times to urinate but falls asleep. Then. Awakens at 6 AM. Has been like that for yrs. Tosses and turns.  Denies RLS, sn Respiratory: Negative for apnea, cough, choking, chest tightness, shortness of breath, wheezing and stridor. Cardiovascular: Negative for chest pain, palpitations and leg swelling.    Gastrointestinal: Negative for abdominal distention and abdominal pain Zolpidem Tartrate 10 MG Oral Tab Take 1 tablet (10 mg total) by mouth nightly as needed for Sleep.  Disp: 20 tablet Rfl: 0     Allergies:  Erythromycin            Hives    HISTORY:  Past Medical History:   Diagnosis Date   • Anxiety state, unspecified    • Pulmonary/Chest: Effort normal and breath sounds normal. No accessory muscle usage or stridor. No apnea, no tachypnea and no bradypnea. No respiratory distress. She has no decreased breath sounds. She has no wheezes. She has no rhonchi. She has no rales.  Holli Whiteside Essential hypertension with goal blood pressure less than 130/80 Good control. Careful with diet and excercise at least 30 minutes 3-4 times a week. Check blood pressures at different times on different days. Can purchase own blood pressure monitor.  If not Sig: TAKE 1 TABLET BY MOUTH EVERY MORNING BEFORE BREAKFAST PRN      simvastatin 20 MG Oral Tab 90 tablet 0      Sig: TAKE 1 TABLET(20 MG) BY MOUTH EVERY NIGHT           Imaging & Referrals:  FLU VACCINE ADJUVANT IM  US THYROID (YUT=[id number])        OY#[id number]

## 2017-09-26 PROBLEM — E78.00 HIGH BLOOD CHOLESTEROL: Status: ACTIVE | Noted: 2017-09-26

## 2017-09-26 PROBLEM — E53.8 B12 DEFICIENCY: Status: ACTIVE | Noted: 2017-09-26

## 2017-09-26 PROBLEM — R74.8 ELEVATED ALKALINE PHOSPHATASE MEASUREMENT: Status: ACTIVE | Noted: 2017-09-26

## 2017-09-27 ENCOUNTER — TELEPHONE (OUTPATIENT)
Dept: INTERNAL MEDICINE CLINIC | Facility: CLINIC | Age: 77
End: 2017-09-27

## 2017-09-27 LAB
25(OH)D3 SERPL-MCNC: 47.6 NG/ML
THYROGLOBULIN ANTIBODY: <1.8 IU/ML
THYROGLOBULIN TUMOR MARKER: <0.1 NG/ML

## 2017-09-27 NOTE — TELEPHONE ENCOUNTER
Stop tizantidine. IF throat closing or swelling or SOB, ER ASAP. If not can try flexeril 5 mg po qhs X 5 nights.

## 2017-09-27 NOTE — TELEPHONE ENCOUNTER
Pt informed of Dr. Shawn Rodriges orders. Verbalized understanding. Still requesting for Dr. Conrado Haley to call her whenever she's back in the office.

## 2017-09-27 NOTE — TELEPHONE ENCOUNTER
Patient informed of Dr. Светлана Antunez instructions, verbalized understanding. Patient still requesting a  Call from Dr. Dee Logan tomorrow. Patient states will not take medication, has completely stopped but will like to know if Dr. Dee Logan will change?  Patient

## 2017-09-27 NOTE — TELEPHONE ENCOUNTER
Please make sure that if I am not there, send message to MD there. I cannot always access my messages yesy. In emergencies.

## 2017-09-27 NOTE — TELEPHONE ENCOUNTER
Pt states that yesterday she felt like fainting, dizziness, some itchiness, and was seeing things that wasn't there; some type of hallucinations. These happened an hour later after taking Tizanidine.  The reaction lasted about 2-3 hrs; states she is feeling

## 2017-09-28 ENCOUNTER — TELEPHONE (OUTPATIENT)
Dept: INTERNAL MEDICINE CLINIC | Facility: CLINIC | Age: 77
End: 2017-09-28

## 2017-09-28 RX ORDER — ALLOPURINOL 100 MG/1
100 TABLET ORAL DAILY
Qty: 30 TABLET | Refills: 0 | Status: SHIPPED | OUTPATIENT
Start: 2017-09-28 | End: 2017-09-29

## 2017-09-28 NOTE — TELEPHONE ENCOUNTER
Patient coming in 10/2 for B 12 injection and BMP  Do you want any tests ordered for Alk phos? Also is uric acid okay?

## 2017-09-28 NOTE — TELEPHONE ENCOUNTER
DO CMP rather than BMP. Uric acid is elevated. Increase allopurinol to 400 a day. Check in 3 months. Goal is < 7.

## 2017-09-29 ENCOUNTER — TELEPHONE (OUTPATIENT)
Dept: INTERNAL MEDICINE CLINIC | Facility: CLINIC | Age: 77
End: 2017-09-29

## 2017-09-29 RX ORDER — ALLOPURINOL 100 MG/1
100 TABLET ORAL DAILY
Qty: 90 TABLET | Refills: 0 | Status: SHIPPED | OUTPATIENT
Start: 2017-09-29 | End: 2018-03-14

## 2017-10-02 ENCOUNTER — NURSE ONLY (OUTPATIENT)
Dept: INTERNAL MEDICINE CLINIC | Facility: CLINIC | Age: 77
End: 2017-10-02

## 2017-10-02 DIAGNOSIS — N28.9 RENAL INSUFFICIENCY: ICD-10-CM

## 2017-10-02 DIAGNOSIS — E53.8 B12 DEFICIENCY: Primary | ICD-10-CM

## 2017-10-02 PROCEDURE — 96372 THER/PROPH/DIAG INJ SC/IM: CPT | Performed by: INTERNAL MEDICINE

## 2017-10-02 PROCEDURE — 36415 COLL VENOUS BLD VENIPUNCTURE: CPT | Performed by: INTERNAL MEDICINE

## 2017-10-02 RX ORDER — CYANOCOBALAMIN 1000 UG/ML
1000 INJECTION INTRAMUSCULAR; SUBCUTANEOUS ONCE
Status: COMPLETED | OUTPATIENT
Start: 2017-10-02 | End: 2017-10-02

## 2017-10-02 RX ADMIN — CYANOCOBALAMIN 1000 MCG: 1000 INJECTION INTRAMUSCULAR; SUBCUTANEOUS at 11:55:00

## 2017-10-09 ENCOUNTER — NURSE ONLY (OUTPATIENT)
Dept: INTERNAL MEDICINE CLINIC | Facility: CLINIC | Age: 77
End: 2017-10-09

## 2017-10-09 DIAGNOSIS — E53.8 B12 DEFICIENCY: Primary | ICD-10-CM

## 2017-10-09 PROCEDURE — 96372 THER/PROPH/DIAG INJ SC/IM: CPT | Performed by: INTERNAL MEDICINE

## 2017-10-09 RX ORDER — CYANOCOBALAMIN 1000 UG/ML
1000 INJECTION INTRAMUSCULAR; SUBCUTANEOUS ONCE
Status: COMPLETED | OUTPATIENT
Start: 2017-10-09 | End: 2017-10-09

## 2017-10-09 RX ADMIN — CYANOCOBALAMIN 1000 MCG: 1000 INJECTION INTRAMUSCULAR; SUBCUTANEOUS at 11:24:00

## 2017-10-16 ENCOUNTER — NURSE ONLY (OUTPATIENT)
Dept: INTERNAL MEDICINE CLINIC | Facility: CLINIC | Age: 77
End: 2017-10-16

## 2017-10-16 DIAGNOSIS — E53.8 B12 DEFICIENCY: Primary | ICD-10-CM

## 2017-10-16 PROCEDURE — 96372 THER/PROPH/DIAG INJ SC/IM: CPT | Performed by: INTERNAL MEDICINE

## 2017-10-16 RX ORDER — CYANOCOBALAMIN 1000 UG/ML
1000 INJECTION INTRAMUSCULAR; SUBCUTANEOUS ONCE
Status: COMPLETED | OUTPATIENT
Start: 2017-10-16 | End: 2017-10-16

## 2017-10-16 RX ADMIN — CYANOCOBALAMIN 1000 MCG: 1000 INJECTION INTRAMUSCULAR; SUBCUTANEOUS at 11:08:00

## 2017-10-16 NOTE — PROGRESS NOTES
Patient presents to office for B12 injection. Order verified on lab result note 9/25/2017. Patient tolerated injection well no reactions, instructed patient to return in a week for 4th weekly injection. Patient verbalized understanding.

## 2017-10-23 ENCOUNTER — NURSE ONLY (OUTPATIENT)
Dept: INTERNAL MEDICINE CLINIC | Facility: CLINIC | Age: 77
End: 2017-10-23

## 2017-10-23 DIAGNOSIS — E53.8 B12 DEFICIENCY: Primary | ICD-10-CM

## 2017-10-23 PROCEDURE — 96372 THER/PROPH/DIAG INJ SC/IM: CPT | Performed by: INTERNAL MEDICINE

## 2017-10-23 RX ORDER — CYANOCOBALAMIN 1000 UG/ML
1000 INJECTION INTRAMUSCULAR; SUBCUTANEOUS ONCE
Status: COMPLETED | OUTPATIENT
Start: 2017-10-23 | End: 2017-10-23

## 2017-10-23 RX ADMIN — CYANOCOBALAMIN 1000 MCG: 1000 INJECTION INTRAMUSCULAR; SUBCUTANEOUS at 11:32:00

## 2017-10-25 RX ORDER — SIMVASTATIN 20 MG
TABLET ORAL
Qty: 90 TABLET | Refills: 0 | Status: SHIPPED | OUTPATIENT
Start: 2017-10-25 | End: 2018-01-29

## 2017-11-09 RX ORDER — ATENOLOL 25 MG/1
TABLET ORAL
Qty: 180 TABLET | Refills: 0 | Status: SHIPPED | OUTPATIENT
Start: 2017-11-09 | End: 2017-11-14 | Stop reason: ALTCHOICE

## 2017-11-14 ENCOUNTER — TELEPHONE (OUTPATIENT)
Dept: INTERNAL MEDICINE CLINIC | Facility: CLINIC | Age: 77
End: 2017-11-14

## 2017-11-14 RX ORDER — METOPROLOL SUCCINATE 50 MG/1
50 TABLET, EXTENDED RELEASE ORAL 2 TIMES DAILY
Qty: 180 TABLET | Refills: 0 | Status: SHIPPED | OUTPATIENT
Start: 2017-11-14 | End: 2018-04-19

## 2017-11-14 RX ORDER — METOPROLOL SUCCINATE 50 MG/1
50 TABLET, EXTENDED RELEASE ORAL 2 TIMES DAILY
Qty: 60 TABLET | Refills: 1 | Status: SHIPPED | OUTPATIENT
Start: 2017-11-14 | End: 2017-11-14

## 2017-11-14 RX ORDER — ATENOLOL 50 MG/1
50 TABLET ORAL DAILY
Qty: 90 TABLET | Refills: 0 | Status: CANCELLED | OUTPATIENT
Start: 2017-11-14

## 2017-11-14 NOTE — TELEPHONE ENCOUNTER
Metoprolol Succinate ER 50 mg PASSED Hypertension Protocol.     Hypertensive Medications  Protocol Criteria:  · Appointment scheduled in the past 6 months or in the next 3 months  · BMP or CMP in the past 12 months  · Creatinine result < 2  Recent Outpatie

## 2017-11-14 NOTE — TELEPHONE ENCOUNTER
Spoke with pt. Informed of substitute rx and lifestyle recommendations. She reports that she walks daily and her dietary choices are healthy. Checks her BP regularly at varying times. Will obtain new rx and denies further questions.

## 2017-11-14 NOTE — TELEPHONE ENCOUNTER
See Rx. New Rx. For atenolol due to still on back order from . Careful with diet and excercise at least 30 minutes 3-4 times a week. Check blood pressures at different times on different days. Can purchase own blood pressure monitor.  If not, ch

## 2017-11-28 RX ORDER — LEVOTHYROXINE SODIUM 0.12 MG/1
125 TABLET ORAL
Qty: 90 TABLET | Refills: 0 | Status: SHIPPED | OUTPATIENT
Start: 2017-11-28 | End: 2018-03-05

## 2018-01-04 RX ORDER — BENAZEPRIL HYDROCHLORIDE 40 MG/1
TABLET, FILM COATED ORAL
Qty: 180 TABLET | Refills: 0 | Status: SHIPPED | OUTPATIENT
Start: 2018-01-04 | End: 2018-04-19

## 2018-01-08 ENCOUNTER — TELEPHONE (OUTPATIENT)
Dept: INTERNAL MEDICINE CLINIC | Facility: CLINIC | Age: 78
End: 2018-01-08

## 2018-01-08 NOTE — TELEPHONE ENCOUNTER
Patient called, her insurance is no longer covering  the rx for Lisinopril, can you please write a new rx and send to [facility]

## 2018-01-09 NOTE — TELEPHONE ENCOUNTER
Checked with pharmacy, lisinopril is covered for a $14 copay. However, pt takes benazapril. Spoke with pt, she states the pharmacy made a mistake and the benazapril is covered. She was able to get rx. Please disregard message.

## 2018-01-29 RX ORDER — SIMVASTATIN 20 MG
TABLET ORAL
Qty: 90 TABLET | Refills: 0 | Status: SHIPPED | OUTPATIENT
Start: 2018-01-29 | End: 2018-03-19 | Stop reason: ALTCHOICE

## 2018-02-12 ENCOUNTER — TELEPHONE (OUTPATIENT)
Dept: INTERNAL MEDICINE CLINIC | Facility: CLINIC | Age: 78
End: 2018-02-12

## 2018-02-12 RX ORDER — HYDROCHLOROTHIAZIDE 12.5 MG/1
CAPSULE, GELATIN COATED ORAL
Qty: 90 CAPSULE | Refills: 0 | Status: SHIPPED | OUTPATIENT
Start: 2018-02-12 | End: 2018-05-23

## 2018-03-05 ENCOUNTER — TELEPHONE (OUTPATIENT)
Dept: INTERNAL MEDICINE CLINIC | Facility: CLINIC | Age: 78
End: 2018-03-05

## 2018-03-05 RX ORDER — LEVOTHYROXINE SODIUM 0.12 MG/1
125 TABLET ORAL
Qty: 90 TABLET | Refills: 0 | Status: SHIPPED | OUTPATIENT
Start: 2018-03-05 | End: 2018-08-30

## 2018-03-05 NOTE — TELEPHONE ENCOUNTER
Current Outpatient Prescriptions:   •  •  Levothyroxine Sodium 125 MCG Oral Tab, Take 1 tablet (125 mcg total) by mouth before breakfast., Disp: 90 tablet, Rfl: 0  •

## 2018-03-11 NOTE — PROGRESS NOTES
HPI:    Patient ID: Odette Caro is a 78year old female. Goes to bed at 10 PM. Sleeps at 1 AM and then awakens at 7 AM. Cannot shut down. Melatonin 6 mg not help. Ambien caused bad dreams. Lexapro not help. Toe Pain   This is a new problem.  The cu Lab Results  Component Value Date/Time    (H) 10/02/2017 11:23 AM    10/02/2017 11:23 AM   K 3.3 10/02/2017 11:23 AM    10/02/2017 11:23 AM   CO2 23 10/02/2017 11:23 AM   CREATSERUM 0.90 10/02/2017 11:23 AM   CA 9.2 10/02/2017 11:23 AM Musculoskeletal: Positive for arthralgias and joint swelling. Negative for myalgias, neck pain and neck stiffness. Skin: Negative for rash. Allergic/Immunologic: Negative for environmental allergies.    Neurological: Negative for dizziness, tingling, tr Prochlorperazine Maleate (COMPAZINE) 10 mg tablet Take 1 tablet (10 mg total) by mouth every 8 (eight) hours as needed for Nausea. Disp: 300 tablet Rfl: 0   aspirin 81 MG Oral Tab Take 81 mg by mouth daily.  Disp:  Rfl:    TiZANidine HCl 4 MG Oral Tab Take Right Ear: Tympanic membrane, external ear and ear canal normal. No lacerations. No drainage, swelling or tenderness. No foreign bodies. No mastoid tenderness.  Tympanic membrane is not injected, not scarred, not perforated, not erythematous, not retracted Eyes: Conjunctivae, EOM and lids are normal. Pupils are equal, round, and reactive to light. Right eye exhibits no chemosis, no discharge, no exudate and no hordeolum. No foreign body present in the right eye.  Left eye exhibits no chemosis, no discharge, n Right hip: She exhibits normal range of motion, normal strength, no tenderness, no bony tenderness, no swelling, no crepitus, no deformity and no laceration.         Left hip: She exhibits normal range of motion, normal strength, no tenderness, no bony Head (right side): No submental, no submandibular, no preauricular, no posterior auricular and no occipital adenopathy present.         Head (left side): No submental, no submandibular, no preauricular, no posterior auricular and no occipital adenopath Disorder of bone     Dysuria Check urine. Increase fluids. Wipe from front to back. Insomnia, unspecified type Sleep hygeine. Try nortriptyline 10 mg at night. Discussed with patient of side effects and use of these medications.      Disorder of thyroid

## 2018-03-12 ENCOUNTER — OFFICE VISIT (OUTPATIENT)
Dept: INTERNAL MEDICINE CLINIC | Facility: CLINIC | Age: 78
End: 2018-03-12

## 2018-03-12 VITALS
SYSTOLIC BLOOD PRESSURE: 114 MMHG | HEART RATE: 86 BPM | BODY MASS INDEX: 28 KG/M2 | DIASTOLIC BLOOD PRESSURE: 66 MMHG | HEIGHT: 64 IN | TEMPERATURE: 99 F | OXYGEN SATURATION: 95 % | WEIGHT: 164 LBS

## 2018-03-12 DIAGNOSIS — Z23 NEED FOR VACCINATION: ICD-10-CM

## 2018-03-12 DIAGNOSIS — E53.8 B12 DEFICIENCY: Primary | ICD-10-CM

## 2018-03-12 DIAGNOSIS — M85.80 OSTEOPENIA, UNSPECIFIED LOCATION: ICD-10-CM

## 2018-03-12 DIAGNOSIS — R74.8 ELEVATED ALKALINE PHOSPHATASE MEASUREMENT: ICD-10-CM

## 2018-03-12 DIAGNOSIS — M89.9 DISORDER OF BONE: ICD-10-CM

## 2018-03-12 DIAGNOSIS — Z12.39 BREAST CANCER SCREENING: ICD-10-CM

## 2018-03-12 DIAGNOSIS — G47.00 INSOMNIA, UNSPECIFIED TYPE: ICD-10-CM

## 2018-03-12 DIAGNOSIS — N28.9 RENAL INSUFFICIENCY: ICD-10-CM

## 2018-03-12 DIAGNOSIS — I10 ESSENTIAL HYPERTENSION WITH GOAL BLOOD PRESSURE LESS THAN 130/80: ICD-10-CM

## 2018-03-12 DIAGNOSIS — R30.0 DYSURIA: ICD-10-CM

## 2018-03-12 DIAGNOSIS — E78.00 HIGH BLOOD CHOLESTEROL: ICD-10-CM

## 2018-03-12 DIAGNOSIS — E89.0 POSTOPERATIVE HYPOTHYROIDISM: ICD-10-CM

## 2018-03-12 DIAGNOSIS — E55.9 VITAMIN D DEFICIENCY: ICD-10-CM

## 2018-03-12 DIAGNOSIS — E78.2 COMBINED HYPERLIPIDEMIA: ICD-10-CM

## 2018-03-12 DIAGNOSIS — E07.9 DISORDER OF THYROID: ICD-10-CM

## 2018-03-12 LAB
ALBUMIN SERPL BCP-MCNC: 3.3 G/DL (ref 3.5–4.8)
ALBUMIN/GLOB SERPL: 0.8 {RATIO} (ref 1–2)
ALP SERPL-CCNC: 120 U/L (ref 32–100)
ALT SERPL-CCNC: 9 U/L (ref 14–54)
ANION GAP SERPL CALC-SCNC: 14 MMOL/L (ref 0–18)
ANION GAP SERPL CALC-SCNC: 14 MMOL/L (ref 0–18)
AST SERPL-CCNC: 15 U/L (ref 15–41)
BILIRUB SERPL-MCNC: 0.6 MG/DL (ref 0.3–1.2)
BUN SERPL-MCNC: 15 MG/DL (ref 8–20)
BUN SERPL-MCNC: 15 MG/DL (ref 8–20)
BUN/CREAT SERPL: 12.3 (ref 10–20)
BUN/CREAT SERPL: 12.3 (ref 10–20)
CALCIUM SERPL-MCNC: 9.7 MG/DL (ref 8.5–10.5)
CALCIUM SERPL-MCNC: 9.7 MG/DL (ref 8.5–10.5)
CHLORIDE SERPL-SCNC: 101 MMOL/L (ref 95–110)
CHLORIDE SERPL-SCNC: 101 MMOL/L (ref 95–110)
CHOLEST SERPL-MCNC: 221 MG/DL (ref 110–200)
CHOLEST SERPL-MCNC: 224 MG/DL (ref 110–200)
CO2 SERPL-SCNC: 25 MMOL/L (ref 22–32)
CO2 SERPL-SCNC: 25 MMOL/L (ref 22–32)
CREAT SERPL-MCNC: 1.22 MG/DL (ref 0.5–1.5)
CREAT SERPL-MCNC: 1.22 MG/DL (ref 0.5–1.5)
GLOBULIN PLAS-MCNC: 4.2 G/DL (ref 2.5–3.7)
GLUCOSE SERPL-MCNC: 87 MG/DL (ref 70–99)
GLUCOSE SERPL-MCNC: 87 MG/DL (ref 70–99)
HDLC SERPL-MCNC: 45 MG/DL
HDLC SERPL-MCNC: 46 MG/DL
LDLC SERPL CALC-MCNC: 133 MG/DL (ref 0–99)
LDLC SERPL CALC-MCNC: 135 MG/DL (ref 0–99)
NONHDLC SERPL-MCNC: 176 MG/DL
NONHDLC SERPL-MCNC: 178 MG/DL
OSMOLALITY UR CALC.SUM OF ELEC: 290 MOSM/KG (ref 275–295)
OSMOLALITY UR CALC.SUM OF ELEC: 290 MOSM/KG (ref 275–295)
PATIENT FASTING: NO
POTASSIUM SERPL-SCNC: 3.3 MMOL/L (ref 3.3–5.1)
POTASSIUM SERPL-SCNC: 3.3 MMOL/L (ref 3.3–5.1)
PROT SERPL-MCNC: 7.5 G/DL (ref 5.9–8.4)
SODIUM SERPL-SCNC: 140 MMOL/L (ref 136–144)
SODIUM SERPL-SCNC: 140 MMOL/L (ref 136–144)
T4 FREE SERPL-MCNC: 0.96 NG/DL (ref 0.58–1.64)
TRIGL SERPL-MCNC: 214 MG/DL (ref 1–149)
TRIGL SERPL-MCNC: 216 MG/DL (ref 1–149)
TSH SERPL-ACNC: 0.31 UIU/ML (ref 0.45–5.33)
URATE SERPL-MCNC: 10.6 MG/DL (ref 2.1–7.4)
VIT B12 SERPL-MCNC: 542 PG/ML (ref 181–914)

## 2018-03-12 PROCEDURE — G0009 ADMIN PNEUMOCOCCAL VACCINE: HCPCS | Performed by: INTERNAL MEDICINE

## 2018-03-12 PROCEDURE — 90732 PPSV23 VACC 2 YRS+ SUBQ/IM: CPT | Performed by: INTERNAL MEDICINE

## 2018-03-12 PROCEDURE — G0463 HOSPITAL OUTPT CLINIC VISIT: HCPCS | Performed by: INTERNAL MEDICINE

## 2018-03-12 PROCEDURE — 36415 COLL VENOUS BLD VENIPUNCTURE: CPT | Performed by: INTERNAL MEDICINE

## 2018-03-12 PROCEDURE — 99215 OFFICE O/P EST HI 40 MIN: CPT | Performed by: INTERNAL MEDICINE

## 2018-03-12 RX ORDER — INDOMETHACIN 50 MG/1
CAPSULE ORAL
Qty: 30 CAPSULE | Refills: 0 | Status: ON HOLD | OUTPATIENT
Start: 2018-03-12 | End: 2020-02-06

## 2018-03-12 RX ORDER — NORTRIPTYLINE HYDROCHLORIDE 10 MG/1
10 CAPSULE ORAL NIGHTLY
Qty: 30 CAPSULE | Refills: 2 | Status: SHIPPED | OUTPATIENT
Start: 2018-03-12 | End: 2018-03-26

## 2018-03-12 NOTE — PATIENT INSTRUCTIONS
ASSESSMENT/PLAN:   B12 deficiency  (primary encounter diagnosis) Check blood. Combined hyperlipidemia Check blood. Elevated alkaline phosphatase measurement Check blood.      Essential hypertension with goal blood pressure less than 130/80 Good cont

## 2018-03-13 ENCOUNTER — TELEPHONE (OUTPATIENT)
Dept: INTERNAL MEDICINE CLINIC | Facility: CLINIC | Age: 78
End: 2018-03-13

## 2018-03-13 LAB — T3 SERPL-MCNC: 0.66 NG/ML (ref 0.87–1.78)

## 2018-03-14 ENCOUNTER — TELEPHONE (OUTPATIENT)
Dept: INTERNAL MEDICINE CLINIC | Facility: CLINIC | Age: 78
End: 2018-03-14

## 2018-03-14 DIAGNOSIS — E78.00 HIGH BLOOD CHOLESTEROL: ICD-10-CM

## 2018-03-14 DIAGNOSIS — E79.0 ELEVATED BLOOD URIC ACID LEVEL: Primary | ICD-10-CM

## 2018-03-14 PROBLEM — N28.9 RENAL INSUFFICIENCY: Status: ACTIVE | Noted: 2018-03-14

## 2018-03-14 LAB
THYROGLOBULIN ANTIBODY: <1.8 IU/ML
THYROGLOBULIN TUMOR MARKER: <0.1 NG/ML

## 2018-03-15 ENCOUNTER — TELEPHONE (OUTPATIENT)
Dept: INTERNAL MEDICINE CLINIC | Facility: CLINIC | Age: 78
End: 2018-03-15

## 2018-03-15 NOTE — TELEPHONE ENCOUNTER
Uric acid elevation might decline in kidney function also. Would recommend increasing allopurinol to 400 a day. Recheck uric acid level in 3 months. Orders written. If not taking simvastatin to begin.   If she is taking, will change her to Lipitor or at

## 2018-03-15 NOTE — PROGRESS NOTES
CMP Normal (electrolytes, sugar, and liver functions), decline in kidney function is stable but still declined. No motrin, ibuprofen, advil, alleve, naprosyn  with these medications. Alkaline phosphatase is elevated which could be liver or bone.   Added jorge

## 2018-03-15 NOTE — TELEPHONE ENCOUNTER
[facility] called and requesting to speak to a clinical staff in regards to verify the Allopurinol rx they received.   [phone]

## 2018-03-19 ENCOUNTER — TELEPHONE (OUTPATIENT)
Dept: INTERNAL MEDICINE CLINIC | Facility: CLINIC | Age: 78
End: 2018-03-19

## 2018-03-19 RX ORDER — ATORVASTATIN CALCIUM 40 MG/1
40 TABLET, FILM COATED ORAL NIGHTLY
Qty: 30 TABLET | Refills: 4 | Status: SHIPPED | OUTPATIENT
Start: 2018-03-19 | End: 2019-01-07

## 2018-03-19 NOTE — TELEPHONE ENCOUNTER
Pt states that she has 300 mg at home, so she only needs the 100 mg for now. Pharmacy has been informed. Pt states that her insurance does not cover the amitriptyline, is there e another med she can have instead?

## 2018-03-19 NOTE — TELEPHONE ENCOUNTER
Pt has been informed of lab results, pt states that she has been taking the simvastatin daily, so she will need a new prescription for an alternate statin.

## 2018-03-19 NOTE — TELEPHONE ENCOUNTER
Pharmacy states that if they give pt 100 mg to take 4 daily it will be too expensive for the pt, they could give her 2 prescriptions, one for 300mg and another for 100mg. Pharmacy needs to know if pt has the 300 mg at home.

## 2018-03-21 ENCOUNTER — TELEPHONE (OUTPATIENT)
Dept: INTERNAL MEDICINE CLINIC | Facility: CLINIC | Age: 78
End: 2018-03-21

## 2018-03-21 NOTE — TELEPHONE ENCOUNTER
Patient was called as requested. Mrs. Marisel Myers was inquiring about status of new prescription ( Nortriptyline ) being ordered by Dr. Gila Velázquez. Patient was informed that Prior Authorization was completed with [employer] today 03/21/18 and approval will take between 3-5 days. Patient will be informed of final decision in regards to this issue. No further questions / concerns at this time.

## 2018-03-21 NOTE — TELEPHONE ENCOUNTER
PA for Nortripyline HCL 10 mg cap completed with [employer] via CMM response time 3-5 business days [id number].

## 2018-03-26 ENCOUNTER — TELEPHONE (OUTPATIENT)
Dept: INTERNAL MEDICINE CLINIC | Facility: CLINIC | Age: 78
End: 2018-03-26

## 2018-03-26 RX ORDER — DULOXETIN HYDROCHLORIDE 20 MG/1
20 CAPSULE, DELAYED RELEASE ORAL NIGHTLY
Qty: 30 CAPSULE | Refills: 1 | Status: SHIPPED | OUTPATIENT
Start: 2018-03-26 | End: 2018-09-18 | Stop reason: ALTCHOICE

## 2018-03-26 NOTE — TELEPHONE ENCOUNTER
This is not covering nortriptyline. Will try Cymbalta 20 mg at night. New Rx sent to the pharmacy. Will follow-up in 4-6 weeks see if the prescription is working.

## 2018-03-27 NOTE — TELEPHONE ENCOUNTER
Call placed to patient to inform patient that nortriptyline is not covered by insurance and changed to cymbalta. Medication sent to pharmacy.  Per patient she stopped taking nortriptyline for 3-4 months ago due to dizziness and nightmares but patient will t

## 2018-04-09 ENCOUNTER — HOSPITAL ENCOUNTER (OUTPATIENT)
Dept: ULTRASOUND IMAGING | Facility: HOSPITAL | Age: 78
Discharge: HOME | End: 2018-04-09
Attending: INTERNAL MEDICINE
Payer: MEDICARE

## 2018-04-09 ENCOUNTER — HOSPITAL ENCOUNTER (OUTPATIENT)
Dept: BONE DENSITY | Facility: HOSPITAL | Age: 78
Discharge: HOME | End: 2018-04-09
Attending: INTERNAL MEDICINE
Payer: MEDICARE

## 2018-04-09 DIAGNOSIS — E89.0 POSTOPERATIVE HYPOTHYROIDISM: ICD-10-CM

## 2018-04-09 DIAGNOSIS — M85.80 OSTEOPENIA, UNSPECIFIED LOCATION: ICD-10-CM

## 2018-04-09 DIAGNOSIS — M89.9 DISORDER OF BONE: ICD-10-CM

## 2018-04-09 PROCEDURE — 76536 US EXAM OF HEAD AND NECK: CPT | Performed by: INTERNAL MEDICINE

## 2018-04-19 RX ORDER — METOPROLOL SUCCINATE 50 MG/1
50 TABLET, EXTENDED RELEASE ORAL 2 TIMES DAILY
Qty: 180 TABLET | Refills: 0 | Status: SHIPPED | OUTPATIENT
Start: 2018-04-19 | End: 2018-07-26

## 2018-04-19 RX ORDER — BENAZEPRIL HYDROCHLORIDE 40 MG/1
TABLET, FILM COATED ORAL
Qty: 180 TABLET | Refills: 0 | Status: SHIPPED | OUTPATIENT
Start: 2018-04-19 | End: 2019-07-19

## 2018-05-23 ENCOUNTER — TELEPHONE (OUTPATIENT)
Dept: INTERNAL MEDICINE CLINIC | Facility: CLINIC | Age: 78
End: 2018-05-23

## 2018-05-23 RX ORDER — HYDROCHLOROTHIAZIDE 12.5 MG/1
CAPSULE, GELATIN COATED ORAL
Qty: 90 CAPSULE | Refills: 0 | Status: SHIPPED | OUTPATIENT
Start: 2018-05-23 | End: 2018-08-30

## 2018-05-23 NOTE — TELEPHONE ENCOUNTER
Needs refill she is completely out     Current Outpatient Prescriptions:   •    •  hydrochlorothiazide 12.5 MG Oral Cap, TAKE ONE CAPSULE BY MOUTH EVERY MORNING, Disp: 90 capsule, Rfl: 0

## 2018-07-25 RX ORDER — BENAZEPRIL HYDROCHLORIDE 40 MG/1
TABLET, FILM COATED ORAL
Qty: 180 TABLET | Refills: 0 | Status: SHIPPED | OUTPATIENT
Start: 2018-07-25 | End: 2018-09-18

## 2018-07-26 RX ORDER — METOPROLOL SUCCINATE 50 MG/1
50 TABLET, EXTENDED RELEASE ORAL 2 TIMES DAILY
Qty: 180 TABLET | Refills: 0 | Status: SHIPPED | OUTPATIENT
Start: 2018-07-26 | End: 2018-10-31

## 2018-07-26 NOTE — TELEPHONE ENCOUNTER
Rx request for Metoprolol Succinate ER 50 mg, PASSED HTN protocol. rx filled per protocol.     Hypertensive Medications  Protocol Criteria:  · Appointment scheduled in the past 6 months or in the next 3 months  · BMP or CMP in the past 12 months  · Creatini

## 2018-07-30 ENCOUNTER — TELEPHONE (OUTPATIENT)
Dept: INTERNAL MEDICINE CLINIC | Facility: CLINIC | Age: 78
End: 2018-07-30

## 2018-07-30 RX ORDER — PANTOPRAZOLE SODIUM 40 MG/1
TABLET, DELAYED RELEASE ORAL
Qty: 90 TABLET | Refills: 0 | Status: SHIPPED | OUTPATIENT
Start: 2018-07-30 | End: 2018-10-31

## 2018-09-18 ENCOUNTER — OFFICE VISIT (OUTPATIENT)
Dept: INTERNAL MEDICINE CLINIC | Facility: CLINIC | Age: 78
End: 2018-09-18
Payer: MEDICARE

## 2018-09-18 VITALS
HEIGHT: 64 IN | WEIGHT: 165 LBS | DIASTOLIC BLOOD PRESSURE: 71 MMHG | BODY MASS INDEX: 28.17 KG/M2 | OXYGEN SATURATION: 95 % | TEMPERATURE: 99 F | SYSTOLIC BLOOD PRESSURE: 108 MMHG | HEART RATE: 111 BPM

## 2018-09-18 DIAGNOSIS — E79.0 ELEVATED BLOOD URIC ACID LEVEL: ICD-10-CM

## 2018-09-18 DIAGNOSIS — E55.9 VITAMIN D DEFICIENCY: ICD-10-CM

## 2018-09-18 DIAGNOSIS — G89.29 CHRONIC MIDLINE LOW BACK PAIN WITHOUT SCIATICA: ICD-10-CM

## 2018-09-18 DIAGNOSIS — E53.8 VITAMIN B 12 DEFICIENCY: Primary | ICD-10-CM

## 2018-09-18 DIAGNOSIS — E04.2 MULTIPLE THYROID NODULES: ICD-10-CM

## 2018-09-18 DIAGNOSIS — G47.00 INSOMNIA, UNSPECIFIED TYPE: ICD-10-CM

## 2018-09-18 DIAGNOSIS — M54.50 CHRONIC MIDLINE LOW BACK PAIN WITHOUT SCIATICA: ICD-10-CM

## 2018-09-18 DIAGNOSIS — R61 UNEXPLAINED NIGHT SWEATS: ICD-10-CM

## 2018-09-18 DIAGNOSIS — E78.2 MIXED HYPERLIPIDEMIA: ICD-10-CM

## 2018-09-18 DIAGNOSIS — E78.00 HIGH BLOOD CHOLESTEROL: ICD-10-CM

## 2018-09-18 DIAGNOSIS — Z78.0 POSTMENOPAUSAL: ICD-10-CM

## 2018-09-18 DIAGNOSIS — I10 ESSENTIAL HYPERTENSION WITH GOAL BLOOD PRESSURE LESS THAN 130/80: ICD-10-CM

## 2018-09-18 PROBLEM — M51.37 DEGENERATION OF LUMBAR OR LUMBOSACRAL INTERVERTEBRAL DISC: Status: ACTIVE | Noted: 2018-09-18

## 2018-09-18 PROBLEM — Z71.85 VACCINE COUNSELING: Status: ACTIVE | Noted: 2018-09-18

## 2018-09-18 PROBLEM — M51.379 DEGENERATION OF LUMBAR OR LUMBOSACRAL INTERVERTEBRAL DISC: Status: ACTIVE | Noted: 2018-09-18

## 2018-09-18 LAB
ALBUMIN SERPL BCP-MCNC: 3.5 G/DL (ref 3.5–4.8)
ALBUMIN/GLOB SERPL: 0.9 {RATIO} (ref 1–2)
ALP SERPL-CCNC: 123 U/L (ref 32–100)
ALT SERPL-CCNC: 14 U/L (ref 14–54)
ANION GAP SERPL CALC-SCNC: 10 MMOL/L (ref 0–18)
AST SERPL-CCNC: 21 U/L (ref 15–41)
BASOPHILS # BLD: 0 K/UL (ref 0–0.2)
BASOPHILS NFR BLD: 1 %
BILIRUB SERPL-MCNC: 0.6 MG/DL (ref 0.3–1.2)
BUN SERPL-MCNC: 18 MG/DL (ref 8–20)
BUN/CREAT SERPL: 12.5 (ref 10–20)
CALCIUM SERPL-MCNC: 9.8 MG/DL (ref 8.5–10.5)
CHLORIDE SERPL-SCNC: 104 MMOL/L (ref 95–110)
CHOLEST SERPL-MCNC: 177 MG/DL (ref 110–200)
CO2 SERPL-SCNC: 27 MMOL/L (ref 22–32)
CREAT SERPL-MCNC: 1.44 MG/DL (ref 0.5–1.5)
EOSINOPHIL # BLD: 0.2 K/UL (ref 0–0.7)
EOSINOPHIL NFR BLD: 3 %
ERYTHROCYTE [DISTWIDTH] IN BLOOD BY AUTOMATED COUNT: 15.1 % (ref 11–15)
ERYTHROCYTE [SEDIMENTATION RATE] IN BLOOD: 46 MM/HR (ref 0–30)
GLOBULIN PLAS-MCNC: 3.8 G/DL (ref 2.5–3.7)
GLUCOSE SERPL-MCNC: 92 MG/DL (ref 70–99)
HCT VFR BLD AUTO: 39.8 % (ref 35–48)
HDLC SERPL-MCNC: 42 MG/DL
HGB BLD-MCNC: 12.8 G/DL (ref 12–16)
LDLC SERPL CALC-MCNC: 76 MG/DL (ref 0–99)
LYMPHOCYTES # BLD: 2.1 K/UL (ref 1–4)
LYMPHOCYTES NFR BLD: 32 %
MCH RBC QN AUTO: 27.5 PG (ref 27–32)
MCHC RBC AUTO-ENTMCNC: 32.2 G/DL (ref 32–37)
MCV RBC AUTO: 85.4 FL (ref 80–100)
MONOCYTES # BLD: 0.6 K/UL (ref 0–1)
MONOCYTES NFR BLD: 10 %
NEUTROPHILS # BLD AUTO: 3.6 K/UL (ref 1.8–7.7)
NEUTROPHILS NFR BLD: 56 %
NONHDLC SERPL-MCNC: 135 MG/DL
OSMOLALITY UR CALC.SUM OF ELEC: 294 MOSM/KG (ref 275–295)
PATIENT FASTING: YES
PLATELET # BLD AUTO: 190 K/UL (ref 140–400)
PMV BLD AUTO: 10.1 FL (ref 7.4–10.3)
POTASSIUM SERPL-SCNC: 3.1 MMOL/L (ref 3.3–5.1)
PROT SERPL-MCNC: 7.3 G/DL (ref 5.9–8.4)
RBC # BLD AUTO: 4.66 M/UL (ref 3.7–5.4)
SODIUM SERPL-SCNC: 141 MMOL/L (ref 136–144)
T4 FREE SERPL-MCNC: 1.09 NG/DL (ref 0.58–1.64)
TRIGL SERPL-MCNC: 297 MG/DL (ref 1–149)
TSH SERPL-ACNC: 0.18 UIU/ML (ref 0.45–5.33)
URATE SERPL-MCNC: 13.8 MG/DL (ref 2.1–7.4)
VIT B12 SERPL-MCNC: 1089 PG/ML (ref 181–914)
WBC # BLD AUTO: 6.5 K/UL (ref 4–11)

## 2018-09-18 PROCEDURE — 36415 COLL VENOUS BLD VENIPUNCTURE: CPT | Performed by: INTERNAL MEDICINE

## 2018-09-18 PROCEDURE — 99215 OFFICE O/P EST HI 40 MIN: CPT | Performed by: INTERNAL MEDICINE

## 2018-09-18 RX ORDER — ZOLPIDEM TARTRATE 5 MG/1
5 TABLET ORAL
COMMUNITY
Start: 2014-10-30 | End: 2018-09-18 | Stop reason: ALTCHOICE

## 2018-09-18 RX ORDER — AMITRIPTYLINE HYDROCHLORIDE 25 MG/1
25 TABLET, FILM COATED ORAL NIGHTLY
Qty: 30 TABLET | Refills: 1 | Status: SHIPPED | OUTPATIENT
Start: 2018-09-18 | End: 2019-09-07

## 2018-09-18 RX ORDER — ATENOLOL 50 MG/1
50 TABLET ORAL
COMMUNITY
Start: 2014-10-30 | End: 2018-09-18 | Stop reason: ALTCHOICE

## 2018-09-18 RX ORDER — AMLODIPINE BESYLATE 10 MG/1
10 TABLET ORAL
COMMUNITY
Start: 2014-10-30 | End: 2019-12-17

## 2018-09-18 RX ORDER — CLONAZEPAM 0.5 MG/1
0.5 TABLET ORAL
COMMUNITY
Start: 2015-05-20 | End: 2018-09-18 | Stop reason: ALTCHOICE

## 2018-09-18 RX ORDER — ASPIRIN 325 MG
TABLET ORAL
COMMUNITY
End: 2018-09-18 | Stop reason: ALTCHOICE

## 2018-09-18 RX ORDER — SIMVASTATIN 20 MG
TABLET ORAL
COMMUNITY
Start: 2016-08-10 | End: 2018-09-18 | Stop reason: ALTCHOICE

## 2018-09-18 NOTE — PROGRESS NOTES
HPI:    Patient ID: Constantine Kim is a 78year old female. Sweats more exertional. Noticed like prior to thyroid. NO fever. Insomnia for yrs. Can't settle down. Melatonin not help. 2-3 times a night awakens.        Hypertension  Patient is here for fol facial swelling, hearing loss, mouth sores, nosebleeds, postnasal drip, rhinorrhea, sinus pressure, sinus pain, sneezing, sore throat, tinnitus, trouble swallowing and voice change.     Eyes: Negative for photophobia, pain, discharge, redness, itching and v Disp: 30 tablet Rfl: 1   hydrochlorothiazide 12.5 MG Oral Cap TAKE ONE CAPSULE BY MOUTH EVERY MORNING Disp: 90 capsule Rfl: 0   Levothyroxine Sodium 125 MCG Oral Tab Take 1 tablet (125 mcg total) by mouth before breakfast. Disp: 90 tablet Rfl: 0   [employer] Cancer Brother         Lung cancer. Construction.        Social History: Social History    Tobacco Use      Smoking status: Never Smoker      Smokeless tobacco: Never Used    Alcohol use: Yes      Comment: occ    Drug use: No       PHYSICAL EXAM:    Physica Eyes: Conjunctivae, EOM and lids are normal. Pupils are equal, round, and reactive to light. Right eye exhibits no chemosis, no discharge, no exudate and no hordeolum. No foreign body present in the right eye.  Left eye exhibits no chemosis, no discharge, normal range of motion, normal strength, no tenderness, no bony tenderness, no swelling, no crepitus, no deformity and no laceration.         Left hip: She exhibits normal range of motion, normal strength, no tenderness, no bony tenderness, no swelling, no Psychiatric: She has a normal mood and affect. Her speech is normal. Cognition and memory are normal.   Nursing note and vitals reviewed. ASSESSMENT/PLAN:   Mixed hyperlipidemia Check blood. Elevated blood uric acid level Check blood.      [facility] Referrals:  [facility] ALT REFERRAL DIAGOSTIC SLEEP STUDY ADULT  XR DEXA BONE DENSITOMETRY (CPT=77080)        ZK#[id number]

## 2018-09-18 NOTE — PATIENT INSTRUCTIONS
ASSESSMENT/PLAN:   Mixed hyperlipidemia Check blood. Elevated blood uric acid level Check blood. Multiple thyroid nodules Check blood. Vitamin d deficiency Check blood. Vitamin b 12 deficiency  (primary encounter diagnosis) Check blood.

## 2018-09-19 LAB
25(OH)D3 SERPL-MCNC: 69.1 NG/ML (ref 30–100)
GGT SERPL-CCNC: 35 U/L (ref 7–50)

## 2018-09-19 NOTE — PROGRESS NOTES
CBC Normal (white blood cells and red blood cells and platelets),   CMP Normal (electrolytes, sugar, and liver functions), potassium level is slightly low. Alkaline phosphatase is slightly elevated which could be liver or bone.   Kidney function worse than

## 2018-09-20 ENCOUNTER — TELEPHONE (OUTPATIENT)
Dept: INTERNAL MEDICINE CLINIC | Facility: CLINIC | Age: 78
End: 2018-09-20

## 2018-09-20 NOTE — TELEPHONE ENCOUNTER
Patient requesting order for flu shot, please specify if high dose is needed.   Please call patient when order is complete to schedule appointment

## 2018-09-20 NOTE — TELEPHONE ENCOUNTER
Patient is asking if you have preference on which rheumatologist she should see? Also asking if she should wait until she has her bone scan before seeing rheumatologist. Please advise.

## 2018-09-20 NOTE — TELEPHONE ENCOUNTER
Information given for rheumatologist. Informed patient to do bone scan first then see rheumatologist.

## 2018-09-20 NOTE — TELEPHONE ENCOUNTER
Patient aware flu shot order is in system, will call office back to schedule appointment, currently planning for .

## 2018-09-20 NOTE — TELEPHONE ENCOUNTER
Go ahead schedule bone scan first. Would still follow up with tanvi. Afterwards. Anyone that works with Dr. Guillermo Lea or ramiro Do.

## 2018-09-23 PROBLEM — R76.8 ANA POSITIVE: Status: ACTIVE | Noted: 2018-09-23

## 2018-09-25 ENCOUNTER — TELEPHONE (OUTPATIENT)
Dept: INTERNAL MEDICINE CLINIC | Facility: CLINIC | Age: 78
End: 2018-09-25

## 2018-09-25 NOTE — TELEPHONE ENCOUNTER
Patient is requesting to speak with Dr. Carrie Obregon directly regarding test results. Have questions about Lupus. Patient aware PCP will be in later today.

## 2018-10-27 RX ORDER — BENAZEPRIL HYDROCHLORIDE 40 MG/1
TABLET, FILM COATED ORAL
Qty: 180 TABLET | Refills: 0 | Status: SHIPPED | OUTPATIENT
Start: 2018-10-27 | End: 2019-03-27

## 2018-10-31 ENCOUNTER — TELEPHONE (OUTPATIENT)
Dept: INTERNAL MEDICINE CLINIC | Facility: CLINIC | Age: 78
End: 2018-10-31

## 2018-10-31 RX ORDER — PANTOPRAZOLE SODIUM 40 MG/1
TABLET, DELAYED RELEASE ORAL
Qty: 90 TABLET | Refills: 0 | Status: SHIPPED | OUTPATIENT
Start: 2018-10-31 | End: 2019-01-27

## 2018-10-31 RX ORDER — LEVOTHYROXINE SODIUM 0.12 MG/1
TABLET ORAL
Qty: 90 TABLET | Refills: 0 | Status: SHIPPED | OUTPATIENT
Start: 2018-10-31 | End: 2019-03-01

## 2018-10-31 RX ORDER — ALLOPURINOL 300 MG/1
300 TABLET ORAL DAILY
Qty: 90 TABLET | Refills: 0 | Status: SHIPPED | OUTPATIENT
Start: 2018-10-31 | End: 2019-01-27

## 2018-10-31 RX ORDER — METOPROLOL SUCCINATE 50 MG/1
50 TABLET, EXTENDED RELEASE ORAL 2 TIMES DAILY
Qty: 180 TABLET | Refills: 0 | Status: SHIPPED | OUTPATIENT
Start: 2018-10-31 | End: 2019-01-27

## 2018-10-31 NOTE — TELEPHONE ENCOUNTER
Current Outpatient Medications:   •   •  Metoprolol Succinate ER 50 MG Oral Tablet 24 Hr, Take 1 tablet (50 mg total) by mouth 2 (two) times daily. , Disp: 180 tablet, Rfl: 0    •  allopurinol 300 MG Oral Tab, Take 1 tablet (300 mg total) by mouth daily. ,

## 2018-11-29 RX ORDER — HYDROCHLOROTHIAZIDE 12.5 MG/1
CAPSULE, GELATIN COATED ORAL
Qty: 90 CAPSULE | Refills: 0 | Status: SHIPPED | OUTPATIENT
Start: 2018-11-29 | End: 2019-03-12

## 2019-01-07 ENCOUNTER — TELEPHONE (OUTPATIENT)
Dept: INTERNAL MEDICINE CLINIC | Facility: CLINIC | Age: 79
End: 2019-01-07

## 2019-01-07 RX ORDER — ATORVASTATIN CALCIUM 40 MG/1
40 TABLET, FILM COATED ORAL NIGHTLY
Qty: 30 TABLET | Refills: 1 | Status: SHIPPED | OUTPATIENT
Start: 2019-01-07 | End: 2019-04-26

## 2019-01-08 RX ORDER — ATORVASTATIN CALCIUM 40 MG/1
TABLET, FILM COATED ORAL
Qty: 90 TABLET | Refills: 1 | OUTPATIENT
Start: 2019-01-08

## 2019-01-28 RX ORDER — PANTOPRAZOLE SODIUM 40 MG/1
TABLET, DELAYED RELEASE ORAL
Qty: 90 TABLET | Refills: 0 | Status: SHIPPED | OUTPATIENT
Start: 2019-01-28 | End: 2019-09-17

## 2019-01-28 RX ORDER — METOPROLOL SUCCINATE 50 MG/1
TABLET, EXTENDED RELEASE ORAL
Qty: 180 TABLET | Refills: 0 | Status: SHIPPED | OUTPATIENT
Start: 2019-01-28 | End: 2019-07-18

## 2019-01-28 RX ORDER — ALLOPURINOL 300 MG/1
TABLET ORAL
Qty: 90 TABLET | Refills: 0 | Status: SHIPPED | OUTPATIENT
Start: 2019-01-28 | End: 2019-06-17

## 2019-03-01 RX ORDER — LEVOTHYROXINE SODIUM 0.12 MG/1
TABLET ORAL
Qty: 90 TABLET | Refills: 0 | Status: SHIPPED | OUTPATIENT
Start: 2019-03-01 | End: 2019-06-17

## 2019-03-12 RX ORDER — HYDROCHLOROTHIAZIDE 12.5 MG/1
CAPSULE, GELATIN COATED ORAL
Qty: 90 CAPSULE | Refills: 0 | Status: SHIPPED | OUTPATIENT
Start: 2019-03-12 | End: 2019-06-17

## 2019-03-27 RX ORDER — BENAZEPRIL HYDROCHLORIDE 40 MG/1
TABLET, FILM COATED ORAL
Qty: 180 TABLET | Refills: 0 | Status: SHIPPED | OUTPATIENT
Start: 2019-03-27 | End: 2019-07-19

## 2019-04-08 ENCOUNTER — OFFICE VISIT (OUTPATIENT)
Dept: INTERNAL MEDICINE CLINIC | Facility: CLINIC | Age: 79
End: 2019-04-08
Payer: MEDICARE

## 2019-04-08 VITALS
DIASTOLIC BLOOD PRESSURE: 70 MMHG | BODY MASS INDEX: 29.37 KG/M2 | HEIGHT: 64 IN | SYSTOLIC BLOOD PRESSURE: 103 MMHG | RESPIRATION RATE: 18 BRPM | TEMPERATURE: 99 F | OXYGEN SATURATION: 97 % | HEART RATE: 78 BPM | WEIGHT: 172 LBS

## 2019-04-08 DIAGNOSIS — E78.00 HYPERCHOLESTEROLEMIA: ICD-10-CM

## 2019-04-08 DIAGNOSIS — E03.8 OTHER SPECIFIED HYPOTHYROIDISM: ICD-10-CM

## 2019-04-08 DIAGNOSIS — M54.50 ACUTE BILATERAL LOW BACK PAIN WITHOUT SCIATICA: Primary | ICD-10-CM

## 2019-04-08 PROCEDURE — 99214 OFFICE O/P EST MOD 30 MIN: CPT | Performed by: INTERNAL MEDICINE

## 2019-04-08 RX ORDER — METHYLPREDNISOLONE 4 MG/1
TABLET ORAL
Qty: 1 KIT | Refills: 0 | Status: ON HOLD | OUTPATIENT
Start: 2019-04-08 | End: 2020-02-06

## 2019-04-08 NOTE — PROGRESS NOTES
HPI:    Patient ID: Sameer Brody is a 70year old female. HPI   She is here  today complainig of lower back pain. According to her pain started since Thursday, lifting.   According to her she did lift her sister who is 80year old and her pain started af problems, confusion, hallucinations and sleep disturbance. The patient is not nervous/anxious. Current Outpatient Medications:  methylPREDNISolone (MEDROL) 4 MG Oral Tablet Therapy Pack As directed.  Disp: 1 kit Rfl: 0   BENAZEPRIL HCL 40 MG Oral murmur     Stress test    • Left bundle branch block (LBBB) on electrocardiogram 6-1-15    AbNL regaadenosine. CTA shows mild soft plaque RCA calcium score zero.     • Migraine    • Other and unspecified hyperlipidemia    • Unspecified essential hypertensio JVD present. No tracheal tenderness present. No tracheal deviation present. No thyroid mass and no thyromegaly present. Cardiovascular: Normal rate, regular rhythm, normal heart sounds and intact distal pulses. Exam reveals no gallop and no friction rub. exercises. Correct posture when sitting with feet flat on floor and back against chair and computer at eye level. medrol dose pack , heating packs     htn-advised to continue with current medication check blood pressure at home and bring logbook next visit

## 2019-04-08 NOTE — PATIENT INSTRUCTIONS
Lower back pain  Can take tylenol, medrol dose pack send to her pharmacy,Careful with back. Correct lifting with legs and not with back. Turn body completely to lift something from side. Back exercises.  Correct posture when sitting with feet flat on floor

## 2019-04-29 RX ORDER — ATORVASTATIN CALCIUM 40 MG/1
TABLET, FILM COATED ORAL
Qty: 90 TABLET | Refills: 1 | Status: SHIPPED | OUTPATIENT
Start: 2019-04-29 | End: 2020-02-28

## 2019-04-29 RX ORDER — PANTOPRAZOLE SODIUM 40 MG/1
TABLET, DELAYED RELEASE ORAL
Qty: 90 TABLET | Refills: 1 | Status: SHIPPED | OUTPATIENT
Start: 2019-04-29 | End: 2019-07-29

## 2019-04-29 RX ORDER — ALLOPURINOL 300 MG/1
TABLET ORAL
Qty: 90 TABLET | Refills: 1 | OUTPATIENT
Start: 2019-04-29

## 2019-04-29 NOTE — TELEPHONE ENCOUNTER
Refill passed per [facility], [facility] protocol.   Cholesterol Medications  Protocol Criteria:  · Appointment scheduled in the past 12 months or in the next 3 months  · ALT & LDL on file in the past 12 months  · ALT result < 80  · LDL result <130   Recent Outpat

## 2019-06-10 RX ORDER — ALLOPURINOL 300 MG/1
TABLET ORAL
Qty: 90 TABLET | Refills: 1 | OUTPATIENT
Start: 2019-06-10

## 2019-06-10 RX ORDER — LEVOTHYROXINE SODIUM 0.12 MG/1
TABLET ORAL
Qty: 90 TABLET | Refills: 1 | OUTPATIENT
Start: 2019-06-10

## 2019-06-10 RX ORDER — HYDROCHLOROTHIAZIDE 12.5 MG/1
CAPSULE, GELATIN COATED ORAL
Qty: 90 CAPSULE | Refills: 1 | OUTPATIENT
Start: 2019-06-10

## 2019-06-10 NOTE — TELEPHONE ENCOUNTER
Please review; protocol failed.     Requested Prescriptions     Pending Prescriptions Disp Refills   • LEVOTHYROXINE SODIUM 125 MCG Oral Tab [Pharmacy Med Name: LEVOTHYROXINE 0.125MG (125MCG) TAB] 90 tablet 0     Sig: TAKE 1 TABLET(125 MCG) BY MOUTH BEFORE

## 2019-06-11 RX ORDER — HYDROCHLOROTHIAZIDE 12.5 MG/1
CAPSULE, GELATIN COATED ORAL
Qty: 90 CAPSULE | Refills: 1 | OUTPATIENT
Start: 2019-06-11

## 2019-06-11 RX ORDER — LEVOTHYROXINE SODIUM 0.12 MG/1
TABLET ORAL
Qty: 90 TABLET | Refills: 1 | OUTPATIENT
Start: 2019-06-11

## 2019-06-11 RX ORDER — ALLOPURINOL 300 MG/1
300 TABLET ORAL
Qty: 90 TABLET | Refills: 1 | OUTPATIENT
Start: 2019-06-11

## 2019-06-11 NOTE — TELEPHONE ENCOUNTER
Please review; protocol failed.     Requested Prescriptions     Pending Prescriptions Disp Refills   • Levothyroxine Sodium 125 MCG Oral Tab 90 tablet 1     Sig: TAKE 1 TABLET(125 MCG) BY MOUTH BEFORE BREAKFAST   • hydrochlorothiazide 12.5 MG Oral Cap 90 ca

## 2019-06-17 RX ORDER — LEVOTHYROXINE SODIUM 0.12 MG/1
TABLET ORAL
Qty: 90 TABLET | Refills: 0 | Status: SHIPPED | OUTPATIENT
Start: 2019-06-17 | End: 2019-09-16

## 2019-06-17 RX ORDER — HYDROCHLOROTHIAZIDE 12.5 MG/1
CAPSULE, GELATIN COATED ORAL
Qty: 90 CAPSULE | Refills: 0 | Status: SHIPPED | OUTPATIENT
Start: 2019-06-17 | End: 2019-09-16

## 2019-06-17 RX ORDER — ALLOPURINOL 300 MG/1
300 TABLET ORAL
Qty: 90 TABLET | Refills: 0 | Status: SHIPPED | OUTPATIENT
Start: 2019-06-17 | End: 2019-09-16

## 2019-07-10 ENCOUNTER — TELEPHONE (OUTPATIENT)
Dept: INTERNAL MEDICINE CLINIC | Facility: CLINIC | Age: 79
End: 2019-07-10

## 2019-07-10 NOTE — TELEPHONE ENCOUNTER
She has no showed several times. She can schedule but will not be able to accommodate her until October.

## 2019-07-10 NOTE — TELEPHONE ENCOUNTER
Patient want to know when can you add her to your schedule .  She keeps trying to schedule with you and you do not have any opening until October November

## 2019-07-18 RX ORDER — METOPROLOL SUCCINATE 50 MG/1
50 TABLET, EXTENDED RELEASE ORAL 2 TIMES DAILY
Qty: 180 TABLET | Refills: 1 | Status: ON HOLD | OUTPATIENT
Start: 2019-07-18 | End: 2020-02-13

## 2019-07-18 NOTE — TELEPHONE ENCOUNTER
Refill passed per [facility], [facility] protocol.   Hypertensive Medications  Protocol Criteria:  · Appointment scheduled in the past 6 months or in the next 3 months  · BMP or CMP in the past 12 months  · Creatinine result < 2  Recent Outpatient Visits

## 2019-07-19 RX ORDER — BENAZEPRIL HYDROCHLORIDE 40 MG/1
TABLET, FILM COATED ORAL
Qty: 180 TABLET | Refills: 0 | Status: SHIPPED | OUTPATIENT
Start: 2019-07-19 | End: 2019-10-29

## 2019-07-19 NOTE — TELEPHONE ENCOUNTER
Refill passed per [facility], [facility] protocol.   Hypertensive Medications  Protocol Criteria:  · Appointment scheduled in the past 6 months or in the next 3 months  · BMP or CMP in the past 12 months  · Creatinine result < 2  Recent Outpatient Visits

## 2019-07-29 RX ORDER — PANTOPRAZOLE SODIUM 40 MG/1
TABLET, DELAYED RELEASE ORAL
Qty: 90 TABLET | Refills: 1 | Status: SHIPPED | OUTPATIENT
Start: 2019-07-29 | End: 2020-06-06

## 2019-07-29 NOTE — TELEPHONE ENCOUNTER
Refill passed per [facility], [facility] protocol.   Refill Protocol Appointment Criteria  · Appointment scheduled in the past 12 months or in the next 3 months  Recent Outpatient Visits            3 months ago Acute bilateral low back pain without sciatica    [facility]

## 2019-08-26 ENCOUNTER — OFFICE VISIT (OUTPATIENT)
Dept: INTERNAL MEDICINE CLINIC | Facility: CLINIC | Age: 79
End: 2019-08-26
Payer: MEDICARE

## 2019-08-26 VITALS
DIASTOLIC BLOOD PRESSURE: 79 MMHG | HEART RATE: 80 BPM | TEMPERATURE: 99 F | SYSTOLIC BLOOD PRESSURE: 125 MMHG | HEIGHT: 64 IN | RESPIRATION RATE: 18 BRPM | BODY MASS INDEX: 29.02 KG/M2 | WEIGHT: 170 LBS

## 2019-08-26 DIAGNOSIS — R61 DIAPHORESIS: ICD-10-CM

## 2019-08-26 DIAGNOSIS — N28.9 RENAL INSUFFICIENCY: ICD-10-CM

## 2019-08-26 DIAGNOSIS — Z00.00 ENCOUNTER FOR ANNUAL HEALTH EXAMINATION: ICD-10-CM

## 2019-08-26 DIAGNOSIS — E79.0 ELEVATED BLOOD URIC ACID LEVEL: ICD-10-CM

## 2019-08-26 DIAGNOSIS — E78.00 HYPERCHOLESTEROLEMIA: Primary | ICD-10-CM

## 2019-08-26 DIAGNOSIS — R74.8 ELEVATED ALKALINE PHOSPHATASE MEASUREMENT: ICD-10-CM

## 2019-08-26 DIAGNOSIS — E55.9 VITAMIN D DEFICIENCY: ICD-10-CM

## 2019-08-26 DIAGNOSIS — E78.2 MIXED HYPERLIPIDEMIA: ICD-10-CM

## 2019-08-26 DIAGNOSIS — E04.2 MULTIPLE THYROID NODULES: ICD-10-CM

## 2019-08-26 DIAGNOSIS — E53.8 VITAMIN B 12 DEFICIENCY: ICD-10-CM

## 2019-08-26 DIAGNOSIS — G47.00 INSOMNIA, UNSPECIFIED TYPE: ICD-10-CM

## 2019-08-26 PROCEDURE — 99497 ADVNCD CARE PLAN 30 MIN: CPT | Performed by: INTERNAL MEDICINE

## 2019-08-26 PROCEDURE — 99214 OFFICE O/P EST MOD 30 MIN: CPT | Performed by: INTERNAL MEDICINE

## 2019-08-26 PROCEDURE — G0439 PPPS, SUBSEQ VISIT: HCPCS | Performed by: INTERNAL MEDICINE

## 2019-08-26 RX ORDER — NORTRIPTYLINE HYDROCHLORIDE 50 MG/1
50 CAPSULE ORAL NIGHTLY
Qty: 30 CAPSULE | Refills: 2 | Status: SHIPPED | OUTPATIENT
Start: 2019-08-26 | End: 2019-11-15

## 2019-08-26 NOTE — PATIENT INSTRUCTIONS
PLAN SUMMARY:   Diagnoses and all orders for this visit:    Hypercholesterolemia Check blood. Mixed hyperlipidemia Check blood. Vitamin D deficiency Check blood. Vitamin B 12 deficiency Check blood.      Multiple thyroid nodules  -     THYROGLOB annually or at 6-month intervals for prediabetic patients        Cardiovascular Disease Screening     Cholesterol, covered every 5 yrs including Total, LDL and Trigs LDL Cholesterol (mg/dL)   Date Value   09/18/2018 76   03/21/2016 82     Cholesterol, Chrisa deficiency. Biennial benefit unless patient has history of long-term glucocorticoid use for medical condition    Last Dexa Scan:   XR DEXA BONE DENSITOMETRY (CPT=77080) 08/15/2016    No flowsheet data found.     Recommended for 2 year anniversary or as or by Medicare Part B) No orders found for this or any previous visit. This may be covered with your pharmacy  prescription benefits     Recommended Websites for Advanced Directives    [email or web address]  An informatio

## 2019-08-26 NOTE — PROGRESS NOTES
HPI:    Patient ID: Manuel Mckinney is a 70year old female. HPI   Manuel Mckinney is a 70year old female who presents for a complete physical exam.   HPI:   Patient presents with: Annual: medicare wellness visit      Eye exam done > 1 yr.      No LMP recorded 1 TABLET BY MOUTH EVERY MORNING BEFORE BREAKFAST Disp: 90 tablet Rfl: 1   Benazepril HCl 40 MG Oral Tab TAKE 1 TABLET(40 MG) BY MOUTH TWICE DAILY Disp: 180 tablet Rfl: 0   Metoprolol Succinate ER 50 MG Oral Tablet 24 Hr Take 1 tablet (50 mg total) by mouth unspecified hyperlipidemia    • Unspecified essential hypertension       Past Surgical History:   Procedure Laterality Date   • CHOLECYSTECTOMY     • OTHER SURGICAL HISTORY      LASER SURGERY ON GALL BLADDER STONES   • THYROIDECTOMY  8/31/15    Total thyro Date/Time    GLU 92 09/18/2018 03:30 PM     09/18/2018 03:30 PM    K 3.1 (L) 09/18/2018 03:30 PM     09/18/2018 03:30 PM    CO2 27 09/18/2018 03:30 PM    CREATSERUM 1.44 09/18/2018 03:30 PM    CA 9.8 09/18/2018 03:30 PM    AST 21 09/18/2018 03: dizziness, tremors, seizures, syncope, facial asymmetry, speech difficulty, weakness, light-headedness, numbness and headaches. Hematological: Negative for adenopathy. Psychiatric/Behavioral: Positive for agitation and sleep disturbance (For years.   Read  Disp: 90 tablet Rfl: 0   AmLODIPine Besylate 10 MG Oral Tab Take 10 mg by mouth. Disp:  Rfl:    Cholecalciferol (D3 SUPER STRENGTH) 2000 units Oral Cap Take 2,000 Units by mouth.  Disp:  Rfl:    Amitriptyline HCl 25 MG Oral Tab Take 1 tablet (25 mg total) b is active. Non-toxic appearance. She does not have a sickly appearance. She does not appear ill. No distress. She is not intubated. HENT:   Head: Normocephalic and atraumatic.    Right Ear: Tympanic membrane, external ear and ear canal normal. No lacerat Right conjunctiva is not injected. Right conjunctiva has no hemorrhage. Left conjunctiva is not injected. Left conjunctiva has no hemorrhage. No scleral icterus. Right eye exhibits normal extraocular motion and no nystagmus.  Left eye exhibits normal extrao exhibits no edema. Lymphadenopathy:        Head (right side): No submental, no submandibular, no preauricular, no posterior auricular and no occipital adenopathy present.         Head (left side): No submental, no submandibular, no preauricular, no poster Medicare Subsequent Annual Wellness visit (Pt already had Initial Annual Wellness).     Fall/Risk Assessment   She has been screened for Falls and is low risk: Fall/Risk Scorin    Cognitive Assessment   She had a completely normal cognitive assessment- D deficiency     Vaccine counseling     GARY positive    Wt Readings from Last 3 Encounters:  08/26/19 : 170 lb (77.1 kg)  04/08/19 : 172 lb (78 kg)  09/18/18 : 165 lb (74.8 kg)     Last Cholesterol Labs:   Lab Results   Component Value Date    CMKKISN 421 from the following:    Height as of this encounter: 5' 4\" (1.626 m). Weight as of this encounter: 170 lb (77.1 kg).     Medicare Hearing Assessment  (Required for AWV/SWV)    Questionnaire       Visual Acuity  Right Eye Visual Acuity: Corrected Right Ey tunnel wrist braces R hand at night. Diet assessment: fair     PLAN:  The patient indicates understanding of these issues and agrees to the plan. Reinforced healthy diet, lifestyle, and exercise. No follow-ups on file. Sky Urena.  Beverly Mauricio MD flowsheet data found. Pap and Pelvic      Pap: Every 3 yrs age 21-68 or Pap+HPV every 5 yrs age 33-67, age 72 and older at high risk There are no preventive care reminders to display for this patient.  Update Health Maintenance if applicable    Chlamydia Value   09/18/2018 1.44     CREATININE (P) (mg/dL)   Date Value   03/21/2016 1.05    No flowsheet data found. Drug Serum Conc  Annually No results found for: DIGOXIN, DIG, VALP No flowsheet data found.        Advance Care Planning done today:     She boyer

## 2019-08-29 ENCOUNTER — TELEPHONE (OUTPATIENT)
Dept: INTERNAL MEDICINE CLINIC | Facility: CLINIC | Age: 79
End: 2019-08-29

## 2019-08-29 NOTE — TELEPHONE ENCOUNTER
[facility] sent request for prior authorization requesting for Nortriptyline 50 mg #30, please call [phone]

## 2019-08-30 NOTE — TELEPHONE ENCOUNTER
Prior authorization for Nortriptyline HCl 50MG capsules completed w/ [employer] on cover my meds Key: [id number], turn around time 24-72 hrs.

## 2019-09-02 NOTE — TELEPHONE ENCOUNTER
PA for Amitriptyline HCl 25 mg tab completed with [employer] via CMM response time 3-5 business days [facility].

## 2019-09-07 RX ORDER — AMITRIPTYLINE HYDROCHLORIDE 25 MG/1
25 TABLET, FILM COATED ORAL NIGHTLY
Qty: 30 TABLET | Refills: 2 | Status: SHIPPED | OUTPATIENT
Start: 2019-09-07 | End: 2019-12-02

## 2019-09-07 NOTE — TELEPHONE ENCOUNTER
Per CMM: Amitriptyline 25mg tablet is approved. quantity of 30 per 30 days. (no fax received)  New prescription pended for review. Dr Ronal Giraldo please see pended medication.

## 2019-09-10 ENCOUNTER — MED REC SCAN ONLY (OUTPATIENT)
Dept: INTERNAL MEDICINE CLINIC | Facility: CLINIC | Age: 79
End: 2019-09-10

## 2019-09-11 ENCOUNTER — APPOINTMENT (OUTPATIENT)
Dept: LAB | Facility: HOSPITAL | Age: 79
End: 2019-09-11
Attending: INTERNAL MEDICINE
Payer: MEDICARE

## 2019-09-11 ENCOUNTER — HOSPITAL ENCOUNTER (OUTPATIENT)
Dept: ULTRASOUND IMAGING | Facility: HOSPITAL | Age: 79
Discharge: HOME | End: 2019-09-11
Attending: INTERNAL MEDICINE
Payer: MEDICARE

## 2019-09-11 DIAGNOSIS — E04.2 MULTIPLE THYROID NODULES: ICD-10-CM

## 2019-09-11 PROCEDURE — 84432 ASSAY OF THYROGLOBULIN: CPT

## 2019-09-11 PROCEDURE — 86800 THYROGLOBULIN ANTIBODY: CPT

## 2019-09-11 PROCEDURE — 76536 US EXAM OF HEAD AND NECK: CPT | Performed by: INTERNAL MEDICINE

## 2019-09-11 PROCEDURE — 36415 COLL VENOUS BLD VENIPUNCTURE: CPT

## 2019-09-12 LAB
THYROGLOBULIN ANTIBODY: <1.8 IU/ML
THYROGLOBULIN TUMOR MARKER: <0.1 NG/ML

## 2019-09-17 ENCOUNTER — TELEPHONE (OUTPATIENT)
Dept: INTERNAL MEDICINE CLINIC | Facility: CLINIC | Age: 79
End: 2019-09-17

## 2019-09-17 RX ORDER — ALLOPURINOL 300 MG/1
TABLET ORAL
Qty: 90 TABLET | Refills: 0 | Status: SHIPPED | OUTPATIENT
Start: 2019-09-17 | End: 2019-12-28

## 2019-09-17 RX ORDER — HYDROCHLOROTHIAZIDE 12.5 MG/1
CAPSULE, GELATIN COATED ORAL
Qty: 90 CAPSULE | Refills: 1 | Status: ON HOLD | OUTPATIENT
Start: 2019-09-17 | End: 2020-02-06

## 2019-09-17 RX ORDER — LEVOTHYROXINE SODIUM 0.12 MG/1
TABLET ORAL
Qty: 90 TABLET | Refills: 1 | Status: SHIPPED | OUTPATIENT
Start: 2019-09-17 | End: 2020-03-10

## 2019-09-17 NOTE — TELEPHONE ENCOUNTER
Refill passed per [facility], [facility] protocol.   Hypertensive Medications  Protocol Criteria:  · Appointment scheduled in the past 6 months or in the next 3 months  · BMP or CMP in the past 12 months  · Creatinine result < 2  Recent Outpatient Visits

## 2019-09-17 NOTE — TELEPHONE ENCOUNTER
Dr Imelda Nevarez, please advise on 2 scripts. Called patient about labs, she stated she was just in and asked  what she needed, she said  called office, verified that she only needed what was done 9/11/19.     Requested Prescriptions Melodie Varela MD    Office Visit    1 year ago B12 deficiency    Jonathan Lambert., MD    Office Visit    1 year ago B12 deficiency    [facility], [facility], [place]    Nurse Only        Future Appointme

## 2019-09-17 NOTE — TELEPHONE ENCOUNTER
I told her that we might need to go up on the dose. Not that to stop it suddenly like that. The medicine sometimes different doses for different people and sometimes as long as she is not having any side effects we can always increase the dose.   Go up to

## 2019-09-17 NOTE — TELEPHONE ENCOUNTER
Patient has amitriptyline 25mg tablets, she will take two tablets(50mg) and will call us back if she does not think it is working

## 2019-09-17 NOTE — TELEPHONE ENCOUNTER
Dr Bob Haas, please advise. During phone call about a refill request on other medications, patient stated that the new medicine she was taking at night for sleep wasn't working.  She said she took the nortriptyline for 4 evenings, but she didn't feel any d

## 2019-11-01 RX ORDER — BENAZEPRIL HYDROCHLORIDE 40 MG/1
TABLET, FILM COATED ORAL
Qty: 180 TABLET | Refills: 1 | Status: SHIPPED | OUTPATIENT
Start: 2019-11-01 | End: 2019-12-17

## 2019-11-01 NOTE — TELEPHONE ENCOUNTER
Please review; protocol failed.  D/t labs  Requested Prescriptions     Pending Prescriptions Disp Refills   • BENAZEPRIL HCL 40 MG Oral Tab [Pharmacy Med Name: BENAZEPRIL 40MG TABLETS] 180 tablet 0     Sig: TAKE 1 TABLET(40 MG) BY MOUTH TWICE DAILY

## 2019-11-15 RX ORDER — PANTOPRAZOLE SODIUM 40 MG/1
TABLET, DELAYED RELEASE ORAL
Qty: 90 TABLET | Refills: 1 | Status: ON HOLD | OUTPATIENT
Start: 2019-11-15 | End: 2020-02-06

## 2019-11-15 RX ORDER — NORTRIPTYLINE HYDROCHLORIDE 50 MG/1
CAPSULE ORAL
Qty: 90 CAPSULE | Refills: 1 | Status: SHIPPED | OUTPATIENT
Start: 2019-11-15 | End: 2019-12-02

## 2019-11-15 NOTE — TELEPHONE ENCOUNTER
Refill passed per [facility], [facility] protocol.   Refill Protocol Appointment Criteria  · Appointment scheduled in the past 6 months or in the next 3 months  Recent Outpatient Visits            2 months ago Hypercholesterolemia    [facility], [facility], [address],

## 2019-11-15 NOTE — TELEPHONE ENCOUNTER
Refill passed per [facility], [facility] protocol.   Refill Protocol Appointment Criteria  · Appointment scheduled in the past 6 months or in the next 3 months  Recent Outpatient Visits            2 months ago Hypercholesterolemia    [facility], [facility], [address],

## 2019-11-30 NOTE — PROGRESS NOTES
HPI:    Patient ID: Dariel Gonzales is a 70year old female. Cough   This is a new problem. The current episode started in the past 7 days. The problem has been gradually worsening. The problem occurs constantly. The cough is non-productive.  Associated symp fever, unexpected weight change and weight loss.    HENT: Negative for congestion, dental problem, drooling, ear discharge, ear pain, facial swelling, hearing loss, mouth sores, nosebleeds, postnasal drip, rhinorrhea, sinus pressure, sinus pain, sneezing, s TABLET BY MOUTH EVERY MORNING BEFORE BREAKFAST 90 tablet 1   • BENAZEPRIL HCL 40 MG Oral Tab TAKE 1 TABLET(40 MG) BY MOUTH TWICE DAILY 180 tablet 1   • LEVOTHYROXINE SODIUM 125 MCG Oral Tab TAKE 1 TABLET(125 MCG) BY MOUTH BEFORE BREAKFAST 90 tablet 1   • A LN -.       Family History   Problem Relation Age of Onset   • Hypertension Mother    • Heart Disease Mother 36        CAD S/P MI.    • Cancer Brother         Lung cancer. Construction.        Social History: Social History    Tobacco Use      Smoking statu dentures. No oral lesions. No trismus in the jaw. No dental abscesses, uvula swelling or lacerations. No oropharyngeal exudate, posterior oropharyngeal edema, posterior oropharyngeal erythema or tonsillar abscesses.    Eyes: Conjunctivae are normal. No scle present. Neurological: She is alert and oriented to person, place, and time. Skin: Skin is warm and dry. She is not diaphoretic. Psychiatric: She has a normal mood and affect. Her behavior is normal.   Nursing note and vitals reviewed.            ASSGOGO for 10 days.        Imaging & Referrals:  XR CHEST PA + LAT CHEST (CPT=71046)        MW#[id number]

## 2019-12-02 ENCOUNTER — OFFICE VISIT (OUTPATIENT)
Dept: INTERNAL MEDICINE CLINIC | Facility: CLINIC | Age: 79
End: 2019-12-02
Payer: MEDICARE

## 2019-12-02 VITALS
HEART RATE: 92 BPM | RESPIRATION RATE: 18 BRPM | DIASTOLIC BLOOD PRESSURE: 69 MMHG | HEIGHT: 64 IN | SYSTOLIC BLOOD PRESSURE: 107 MMHG | BODY MASS INDEX: 29.13 KG/M2 | TEMPERATURE: 98 F | WEIGHT: 170.63 LBS | OXYGEN SATURATION: 97 %

## 2019-12-02 DIAGNOSIS — E55.9 VITAMIN D DEFICIENCY: ICD-10-CM

## 2019-12-02 DIAGNOSIS — N28.9 RENAL INSUFFICIENCY: ICD-10-CM

## 2019-12-02 DIAGNOSIS — R05.9 COUGH: ICD-10-CM

## 2019-12-02 DIAGNOSIS — E03.8 OTHER SPECIFIED HYPOTHYROIDISM: ICD-10-CM

## 2019-12-02 DIAGNOSIS — I10 ESSENTIAL HYPERTENSION: Primary | ICD-10-CM

## 2019-12-02 DIAGNOSIS — R76.8 ANA POSITIVE: ICD-10-CM

## 2019-12-02 DIAGNOSIS — Z71.85 VACCINE COUNSELING: ICD-10-CM

## 2019-12-02 DIAGNOSIS — E78.00 HYPERCHOLESTEROLEMIA: ICD-10-CM

## 2019-12-02 DIAGNOSIS — E78.2 MIXED HYPERLIPIDEMIA: ICD-10-CM

## 2019-12-02 PROCEDURE — 36415 COLL VENOUS BLD VENIPUNCTURE: CPT | Performed by: INTERNAL MEDICINE

## 2019-12-02 PROCEDURE — 99214 OFFICE O/P EST MOD 30 MIN: CPT | Performed by: INTERNAL MEDICINE

## 2019-12-02 RX ORDER — MONTELUKAST SODIUM 10 MG/1
10 TABLET ORAL NIGHTLY
Qty: 7 TABLET | Refills: 0 | Status: SHIPPED | OUTPATIENT
Start: 2019-12-02 | End: 2019-12-09

## 2019-12-02 RX ORDER — ALBUTEROL SULFATE 90 UG/1
2 AEROSOL, METERED RESPIRATORY (INHALATION) EVERY 4 HOURS PRN
Qty: 1 INHALER | Refills: 6 | Status: ON HOLD | OUTPATIENT
Start: 2019-12-02 | End: 2020-02-06

## 2019-12-02 RX ORDER — LEVOFLOXACIN 750 MG/1
750 TABLET ORAL DAILY
Qty: 10 TABLET | Refills: 0 | Status: SHIPPED | OUTPATIENT
Start: 2019-12-02 | End: 2019-12-12

## 2019-12-03 RX ORDER — MONTELUKAST SODIUM 10 MG/1
TABLET ORAL
Qty: 90 TABLET | Refills: 0 | OUTPATIENT
Start: 2019-12-03

## 2019-12-04 NOTE — TELEPHONE ENCOUNTER
Medication prescribed at office visit yesterday with provider for cough, 90 day supply not appropriate.

## 2019-12-10 ENCOUNTER — TELEPHONE (OUTPATIENT)
Dept: OTHER | Age: 79
End: 2019-12-10

## 2019-12-10 NOTE — TELEPHONE ENCOUNTER
OK noted. Called patient and booked appt on Nurse visit schedule for the [place] office. Patient will bring her medication bottles to the appointment.

## 2019-12-10 NOTE — TELEPHONE ENCOUNTER
Patient had a lot of blood work done on 12/2/19. Some of the results she viewed on [employer] and some she did not. Patient needs additional blood work which the doctor has ordered.  Called the patient to make sure she understands all of the result messages an Christian Jin MD on 12/4/2019 10:06 PM   CMP Normal (electrolytes, sugar, and liver functions), slight decline in kidney function which is better than prior. No motrin, ibuprofen, advil, alleve, naprosyn  with these medications.  Alkaline phosphatase which cou

## 2019-12-10 NOTE — TELEPHONE ENCOUNTER
Is a good idea to have her schedule an RN only visit in 1 week have her bring her machine and have her compare her machine with ours.   But also have them do orthostatics on her which sitting and standing blood pressure and heart rate as an RN only visit an

## 2019-12-17 ENCOUNTER — NURSE ONLY (OUTPATIENT)
Dept: INTERNAL MEDICINE CLINIC | Facility: CLINIC | Age: 79
End: 2019-12-17
Payer: MEDICARE

## 2019-12-17 ENCOUNTER — OFFICE VISIT (OUTPATIENT)
Dept: INTERNAL MEDICINE CLINIC | Facility: CLINIC | Age: 79
End: 2019-12-17
Payer: MEDICARE

## 2019-12-17 ENCOUNTER — TELEPHONE (OUTPATIENT)
Dept: INTERNAL MEDICINE CLINIC | Facility: CLINIC | Age: 79
End: 2019-12-17

## 2019-12-17 VITALS — DIASTOLIC BLOOD PRESSURE: 66 MMHG | SYSTOLIC BLOOD PRESSURE: 102 MMHG | HEART RATE: 97 BPM

## 2019-12-17 VITALS — DIASTOLIC BLOOD PRESSURE: 52 MMHG | SYSTOLIC BLOOD PRESSURE: 90 MMHG | HEART RATE: 82 BPM

## 2019-12-17 DIAGNOSIS — M54.41 CHRONIC BILATERAL LOW BACK PAIN WITH BILATERAL SCIATICA: ICD-10-CM

## 2019-12-17 DIAGNOSIS — R42 DIZZINESS: ICD-10-CM

## 2019-12-17 DIAGNOSIS — R30.0 DYSURIA: ICD-10-CM

## 2019-12-17 DIAGNOSIS — M25.562 CHRONIC PAIN OF BOTH KNEES: Primary | ICD-10-CM

## 2019-12-17 DIAGNOSIS — G89.29 CHRONIC PAIN OF BOTH KNEES: Primary | ICD-10-CM

## 2019-12-17 DIAGNOSIS — M25.561 CHRONIC PAIN OF BOTH KNEES: Primary | ICD-10-CM

## 2019-12-17 DIAGNOSIS — M54.42 CHRONIC BILATERAL LOW BACK PAIN WITH BILATERAL SCIATICA: ICD-10-CM

## 2019-12-17 DIAGNOSIS — N39.0 URINARY TRACT INFECTION WITHOUT HEMATURIA, SITE UNSPECIFIED: ICD-10-CM

## 2019-12-17 DIAGNOSIS — Z01.30 BLOOD PRESSURE CHECK: Primary | ICD-10-CM

## 2019-12-17 DIAGNOSIS — G89.29 CHRONIC BILATERAL LOW BACK PAIN WITH BILATERAL SCIATICA: ICD-10-CM

## 2019-12-17 PROCEDURE — 81003 URINALYSIS AUTO W/O SCOPE: CPT | Performed by: INTERNAL MEDICINE

## 2019-12-17 PROCEDURE — 99215 OFFICE O/P EST HI 40 MIN: CPT | Performed by: INTERNAL MEDICINE

## 2019-12-17 RX ORDER — BENAZEPRIL HYDROCHLORIDE 40 MG/1
40 TABLET, FILM COATED ORAL DAILY
Qty: 180 TABLET | Refills: 1 | Status: ON HOLD | OUTPATIENT
Start: 2019-12-17 | End: 2020-02-06

## 2019-12-17 NOTE — PROGRESS NOTES
Patient here for BP check, with low blood pressure 92/52, a little lightheaded. Reviewed medication list.  Has not eaten.   Patient given water to drink will recheck BP

## 2019-12-17 NOTE — PROGRESS NOTES
HPI:    Patient ID: Maryuri Corona is a 70year old female. HPI    Hypertension  Patient is here for follow up of hypertension. BP at home: 110/70's. Machine is 2 yrs. Old. Has been compliant with medications.   Exercise level: somewhat active and has been pain, anal bleeding, blood in stool, constipation, diarrhea, nausea, rectal pain and vomiting. Endocrine: Negative for cold intolerance, heat intolerance, polydipsia, polyphagia and polyuria. Genitourinary: Positive for dysuria (X 24 hrs. ).  Negative f PANTOPRAZOLE SODIUM 40 MG Oral Tab EC TAKE 1 TABLET BY MOUTH EVERY MORNING BEFORE BREAKFAST 90 tablet 1   • Metoprolol Succinate ER 50 MG Oral Tablet 24 Hr Take 1 tablet (50 mg total) by mouth 2 (two) times daily.  180 tablet 1   • ATORVASTATIN 40 MG Oral T 125/79    Wt Readings from Last 3 Encounters:  12/02/19 : 170 lb 9.6 oz (77.4 kg)  08/26/19 : 170 lb (77.1 kg)  04/08/19 : 172 lb (78 kg)      Physical Exam   Constitutional: She is oriented to person, place, and time.  Vital signs are normal. She appears w phonation normal. Neck supple. No thyroid mass and no thyromegaly present. Cardiovascular: Normal rate, regular rhythm, S1 normal, S2 normal, normal heart sounds, intact distal pulses and normal pulses.    Pulses:       Carotid pulses are 2+ on the right normal range of motion, no swelling, no ecchymosis, no deformity, no laceration and normal pulse. No tenderness. Achilles tendon normal. Achilles tendon exhibits no pain, no defect and normal Castaneda's test results.       Left ankle: She exhibits normal ra time to get up stay couple minutes sitting and then stand up couple minutes but I do not get up quickly. Decrease Benzapril to 40 mg once a day which is already taken today. Increase fluids today.   Bring in blood pressure machine for RN only visit in 1 w

## 2019-12-17 NOTE — PATIENT INSTRUCTIONS
ASSESSMENT/PLAN:   Chronic pain of both knees  (primary encounter diagnosis) possibly some osteoarthritis. Check x-rays. Try cream over-the-counter that has been using. Chronic bilateral low back pain with bilateral sciatica check x-rays.     Dysuria C Back and neck pain are common problems. Most people feel better in 1 or 2 weeks, and most of the rest in 1 to 2 months. Most people can remain active. People have and describe pain differently.   · Pain can be sharp, stabbing, shooting, aching, cramping, o · At first, do not try to stretch out the sore spots. If there is a strain, it is not like the good soreness you get after exercising without an injury. In this case, stretching may make it worse.   · Don't sit for long periods, as in long car rides or othe · Fainting or loss of consciousness  · Rapid or very slow heart rate  · Loss of bowel or bladder control  When to seek medical advice  Call your healthcare provider right away if any of these occur:  · Pain becomes worse or spreads into your arms or legs · During the first 24 to 72 hours after an acute injury or flare-up of chronic back pain, put an ice pack on the painful area for 20 minutes and then remove it for 20 minutes. Do thisover a period of 60 to 90 minutes, or several times a day.  As a safety pr · Straighten your legs to lift the object. · Lower the object to the floor in the reverse fashion. · If you must slide something across the floor, push it. Posture tips  Sitting  Sit in chairs with straight backs or low-back support.  Keep your knees © 8285-6696 [employer] [address]. [address] [facility], [facility] [place]. All rights reserved. This information is not intended as a substitute for professional medical care. Always follow your healthcare professional's instructions.         Exercis 3. Abdominal contraction: Bend your knees and put your hands on your stomach. Tighten your stomach muscles. Hold for 5 seconds, then relax. Repeat 10 times. 4. Straight leg raise: Bend one leg at the knee and keep the other leg straight.  Tighten your stom 3. Pelvic tilt: Lie on the floor on your back with your knees bent at 90 degrees. Your feet should be flat on the floor. Inhale, exhale, then slowly contract your abdominal muscles bringing your navel toward your spine.  Let your pelvis rock back until your · Overexertion, lifting, pushing, pulling incorrectly or too aggressively  · Sudden twisting, bending, or stretching from an accident, or accidental movement  · Poor posture  · Stretching or moving wrong, without noticing pain at the time  · Poor coordinat · During the first 24 to 72 hours after an acute injury or flare up of chronic back pain, apply an ice pack to the painful area for 20 minutes and then remove it for 20 minutes. Do this over a period of 60 to 90 minutes or several times a day.  This will re · Confusion  · Very drowsy or trouble awakening  · Fainting or loss of consciousness  · Rapid or very slow heart rate  · Loss of bowel or bladder control  When to seek medical advice  Call your healthcare provider right away if any of these occur:   · Pain Exercise can help your back heal. It also helps your back get stronger and more flexible, preventing any reinjury. Ask your healthcare provider about specific exercises for your back.   Use good posture to avoid reinjury  · When moving, bend at the hips and · Don’t ice for more than 20 minutes at a time. · You can use ice several times a day. Medicines  Over-the-counter pain relievers include acetaminophen and anti-inflammatory medicines, which includes aspirin, naproxen, or ibuprofen.  They can help ease di · Kneel down on 1 knee, if necessary. · Get as close to the object as you can, so you won’t have to reach with your arms. · Keep the load close to your body. “Hug” it unless there is a chance it may contain sharps.   · Tighten your stomach muscles to supp · Handle minor aches with cold and heat. Apply cold the first 24 to 48 hours. Use heat after that. Always place a thin cloth between your skin and the source of cold or heat. · Take medicines as directed. This helps keep pain under control.  Always read la Strong, flexible muscles help maintain your 3 natural curves. They hold your spine in correct alignment. This helps support your upper body. Strong core muscles help take the strain off your back. These include the stomach, buttock, and thigh muscles.   [place] To start, lie on your back with your knees bent and feet flat on the floor. Don’t press your neck or lower back to the floor. Breathe deeply. You should feel comfortable and relaxed in this position. · Drop both knees to one side.  Turn your head to the ot © 0537-6837 [employer] [address]. [address] [facility], [address]. All rights reserved. This information is not intended as a substitute for professional medical care. Always follow your healthcare professional's instructions.             Pos · Medicines. Certain medicines can cause dizziness and even fainting. In some cases, stopping a medicine too quickly can lead to withdrawal symptoms, including dizziness and fainting. · Anxiety.  Being anxious can lead to breathing changes, such as hyperve Review your medicines  Medicines (even ones you buy over the counter) can cause side effects that lead to a fall.  Common medicines that cause these kinds of side effects are blood pressure, heart, or pain medicines, medicines for sleep, and antidepressants Staying active is one of the best things you can do to prevent falls. Keep in mind that doing too little can be as risky as doing too much. That's because not being active can make you weaker and more likely to fall.  Balance, flexibility, strength, and end Your balance mechanism is in your inner ear. Anything disturbing it can make you feel dizzy, whether it is from a cold, an injury, or many other things.  Anything that causes your blood pressure to drop suddenly can make you feel lightheaded, or like you ar · Trouble breathing  · Confused or trouble arousing  · Fainting or loss of consciousness  · Rapid or very slow heart rate  · Seizure  · Trouble with speech or vision, weakness of an arm or leg  · Trouble walking or talking, loss of balance  · Numbness or w

## 2019-12-18 ENCOUNTER — TELEPHONE (OUTPATIENT)
Dept: INTERNAL MEDICINE CLINIC | Facility: CLINIC | Age: 79
End: 2019-12-18

## 2019-12-18 NOTE — TELEPHONE ENCOUNTER
Patient calling has medication questions. Not sure which one she was supposed to stop taking. Asking for call back.

## 2019-12-19 NOTE — TELEPHONE ENCOUNTER
Patient following up to clarify medication changes from her recent OV 2/17. Reviewed with patient per notes, her Benazepril was decreased and check BP twice daily follow up with BP readings in 1 week.  Patient had concerns that her BP machine was faulty, pr

## 2019-12-28 RX ORDER — ALLOPURINOL 300 MG/1
TABLET ORAL
Qty: 90 TABLET | Refills: 1 | Status: SHIPPED | OUTPATIENT
Start: 2019-12-28 | End: 2020-02-27

## 2019-12-28 NOTE — TELEPHONE ENCOUNTER
Review pended refill request as it does not fall under a protocol.   Requested Prescriptions     Pending Prescriptions Disp Refills   • ALLOPURINOL 300 MG Oral Tab [Pharmacy Med Name: ALLOPURINOL 300MG TABLETS] 90 tablet 0     Sig: TAKE 1 TABLET(300 MG) BY

## 2019-12-31 ENCOUNTER — LAB ENCOUNTER (OUTPATIENT)
Dept: LAB | Facility: HOSPITAL | Age: 79
End: 2019-12-31
Attending: INTERNAL MEDICINE
Payer: MEDICARE

## 2019-12-31 ENCOUNTER — HOSPITAL ENCOUNTER (OUTPATIENT)
Dept: GENERAL RADIOLOGY | Facility: HOSPITAL | Age: 79
Discharge: HOME | End: 2019-12-31
Attending: INTERNAL MEDICINE
Payer: MEDICARE

## 2019-12-31 DIAGNOSIS — G89.29 CHRONIC PAIN OF BOTH KNEES: ICD-10-CM

## 2019-12-31 DIAGNOSIS — R05.9 COUGH: ICD-10-CM

## 2019-12-31 DIAGNOSIS — R76.8 ANA POSITIVE: ICD-10-CM

## 2019-12-31 DIAGNOSIS — G89.29 CHRONIC BILATERAL LOW BACK PAIN WITH BILATERAL SCIATICA: ICD-10-CM

## 2019-12-31 DIAGNOSIS — E55.9 VITAMIN D DEFICIENCY: ICD-10-CM

## 2019-12-31 DIAGNOSIS — N28.9 RENAL INSUFFICIENCY: ICD-10-CM

## 2019-12-31 DIAGNOSIS — D72.819 LEUKOPENIA, UNSPECIFIED TYPE: ICD-10-CM

## 2019-12-31 DIAGNOSIS — M54.42 CHRONIC BILATERAL LOW BACK PAIN WITH BILATERAL SCIATICA: ICD-10-CM

## 2019-12-31 DIAGNOSIS — M54.41 CHRONIC BILATERAL LOW BACK PAIN WITH BILATERAL SCIATICA: ICD-10-CM

## 2019-12-31 DIAGNOSIS — M25.562 CHRONIC PAIN OF BOTH KNEES: ICD-10-CM

## 2019-12-31 DIAGNOSIS — M25.561 CHRONIC PAIN OF BOTH KNEES: ICD-10-CM

## 2019-12-31 LAB
BASOPHILS # BLD AUTO: 0.02 X10(3) UL (ref 0–0.2)
BASOPHILS NFR BLD AUTO: 0.3 %
C4 SERPL-MCNC: 36.8 MG/DL (ref 10–40)
DEPRECATED RDW RBC AUTO: 47.7 FL (ref 35.1–46.3)
EOSINOPHIL # BLD AUTO: 0.34 X10(3) UL (ref 0–0.7)
EOSINOPHIL NFR BLD AUTO: 5.7 %
ERYTHROCYTE [DISTWIDTH] IN BLOOD BY AUTOMATED COUNT: 14.6 % (ref 11–15)
HAV AB SER QL IA: REACTIVE
HBV CORE AB SERPL QL IA: NONREACTIVE
HBV SURFACE AB SER QL: REACTIVE
HBV SURFACE AB SERPL IA-ACNC: 14.39 MIU/ML
HBV SURFACE AG SERPL QL IA: NONREACTIVE
HCT VFR BLD AUTO: 35.3 % (ref 35–48)
HCV AB SERPL QL IA: NONREACTIVE
HGB BLD-MCNC: 11 G/DL (ref 12–16)
IGA SERPL-MCNC: 345 MG/DL (ref 70–312)
IGM SERPL-MCNC: 70.8 MG/DL (ref 43–279)
IMM GRANULOCYTES # BLD AUTO: 0.02 X10(3) UL (ref 0–1)
IMM GRANULOCYTES NFR BLD: 0.3 %
IMMUNOGLOBULIN PNL SER-MCNC: 1180 MG/DL (ref 791–1643)
LYMPHOCYTES # BLD AUTO: 1.41 X10(3) UL (ref 1–4)
LYMPHOCYTES NFR BLD AUTO: 23.5 %
MCH RBC QN AUTO: 27.6 PG (ref 26–34)
MCHC RBC AUTO-ENTMCNC: 31.2 G/DL (ref 31–37)
MCV RBC AUTO: 88.5 FL (ref 80–100)
MONOCYTES # BLD AUTO: 0.43 X10(3) UL (ref 0.1–1)
MONOCYTES NFR BLD AUTO: 7.2 %
NEUTROPHILS # BLD AUTO: 3.79 X10 (3) UL (ref 1.5–7.7)
NEUTROPHILS # BLD AUTO: 3.79 X10(3) UL (ref 1.5–7.7)
NEUTROPHILS NFR BLD AUTO: 63 %
PLATELET # BLD AUTO: 232 10(3)UL (ref 150–450)
RBC # BLD AUTO: 3.99 X10(6)UL (ref 3.8–5.3)
WBC # BLD AUTO: 6 X10(3) UL (ref 4–11)

## 2019-12-31 PROCEDURE — 71046 X-RAY EXAM CHEST 2 VIEWS: CPT | Performed by: INTERNAL MEDICINE

## 2019-12-31 PROCEDURE — 87340 HEPATITIS B SURFACE AG IA: CPT

## 2019-12-31 PROCEDURE — 82784 ASSAY IGA/IGD/IGG/IGM EACH: CPT

## 2019-12-31 PROCEDURE — 83516 IMMUNOASSAY NONANTIBODY: CPT | Performed by: INTERNAL MEDICINE

## 2019-12-31 PROCEDURE — 85025 COMPLETE CBC W/AUTO DIFF WBC: CPT

## 2019-12-31 PROCEDURE — 80500 HEPATITIS A B + C PROFILE: CPT

## 2019-12-31 PROCEDURE — 72110 X-RAY EXAM L-2 SPINE 4/>VWS: CPT | Performed by: INTERNAL MEDICINE

## 2019-12-31 PROCEDURE — 86709 HEPATITIS A IGM ANTIBODY: CPT

## 2019-12-31 PROCEDURE — 86235 NUCLEAR ANTIGEN ANTIBODY: CPT | Performed by: INTERNAL MEDICINE

## 2019-12-31 PROCEDURE — 86803 HEPATITIS C AB TEST: CPT

## 2019-12-31 PROCEDURE — 83876 ASSAY MYELOPEROXIDASE: CPT

## 2019-12-31 PROCEDURE — 83516 IMMUNOASSAY NONANTIBODY: CPT

## 2019-12-31 PROCEDURE — 73560 X-RAY EXAM OF KNEE 1 OR 2: CPT | Performed by: INTERNAL MEDICINE

## 2019-12-31 PROCEDURE — 86160 COMPLEMENT ANTIGEN: CPT | Performed by: INTERNAL MEDICINE

## 2019-12-31 PROCEDURE — 86334 IMMUNOFIX E-PHORESIS SERUM: CPT

## 2019-12-31 PROCEDURE — 86162 COMPLEMENT TOTAL (CH50): CPT

## 2019-12-31 PROCEDURE — 86200 CCP ANTIBODY: CPT | Performed by: INTERNAL MEDICINE

## 2019-12-31 PROCEDURE — 86704 HEP B CORE ANTIBODY TOTAL: CPT

## 2019-12-31 PROCEDURE — 86255 FLUORESCENT ANTIBODY SCREEN: CPT

## 2019-12-31 PROCEDURE — 36415 COLL VENOUS BLD VENIPUNCTURE: CPT

## 2019-12-31 PROCEDURE — 84165 PROTEIN E-PHORESIS SERUM: CPT

## 2019-12-31 PROCEDURE — 86706 HEP B SURFACE ANTIBODY: CPT

## 2019-12-31 PROCEDURE — 86160 COMPLEMENT ANTIGEN: CPT

## 2019-12-31 PROCEDURE — 86708 HEPATITIS A ANTIBODY: CPT

## 2019-12-31 PROCEDURE — 86235 NUCLEAR ANTIGEN ANTIBODY: CPT

## 2019-12-31 PROCEDURE — 82306 VITAMIN D 25 HYDROXY: CPT

## 2019-12-31 PROCEDURE — 85652 RBC SED RATE AUTOMATED: CPT | Performed by: INTERNAL MEDICINE

## 2020-01-01 ENCOUNTER — EXTERNAL RECORD (OUTPATIENT)
Dept: HEALTH INFORMATION MANAGEMENT | Facility: OTHER | Age: 80
End: 2020-01-01

## 2020-01-01 LAB
25(OH)D3 SERPL-MCNC: 36.3 NG/ML (ref 30–100)
HAV IGM SER QL: NONREACTIVE
RIBOSOMAL P ANTIBODY: 0 AU/ML

## 2020-01-02 LAB
COMPLEMENT ACTIVITY, TOTAL EIA: 175 CAE UNITS
GBM AB, IGG (MAFD): 0 AU/ML
MYELOPEROX ANTIBODIES, IGG: 1 AU/ML
SERINE PROTEASE3, IGG: 0 AU/ML

## 2020-01-03 ENCOUNTER — TELEPHONE (OUTPATIENT)
Dept: OTHER | Age: 80
End: 2020-01-03

## 2020-01-03 LAB
ALBUMIN SERPL ELPH-MCNC: 2.95 G/DL (ref 3.75–5.21)
ALBUMIN/GLOB SERPL: 0.79 {RATIO} (ref 1–2)
ALPHA1 GLOB SERPL ELPH-MCNC: 0.42 G/DL (ref 0.19–0.46)
ALPHA2 GLOB SERPL ELPH-MCNC: 1.23 G/DL (ref 0.48–1.05)
B-GLOBULIN SERPL ELPH-MCNC: 0.88 G/DL (ref 0.68–1.23)
GAMMA GLOB SERPL ELPH-MCNC: 1.23 G/DL (ref 0.62–1.7)
M PROTEIN MFR SERPL ELPH: 6.7 G/DL (ref 6.4–8.2)

## 2020-01-03 RX ORDER — ACETAMINOPHEN AND CODEINE PHOSPHATE 300; 30 MG/1; MG/1
1 TABLET ORAL NIGHTLY PRN
Qty: 10 TABLET | Refills: 0 | Status: SHIPPED | OUTPATIENT
Start: 2020-01-03 | End: 2020-02-27 | Stop reason: ALTCHOICE

## 2020-01-03 NOTE — TELEPHONE ENCOUNTER
The patient was informed. She stated she will take the Tylenol #3   I pended the medications as per below. Notes recorded by Nii Carmona MD on 1/3/2020 at 10:06 AM CST  Can try capasacium cream on knee qhs OTC?  Or tylenol #3 1 po qhs #10 no Rf

## 2020-01-07 LAB — ENA SM+RNP AB SER QL: NEGATIVE

## 2020-02-04 ENCOUNTER — APPOINTMENT (OUTPATIENT)
Dept: GENERAL RADIOLOGY | Facility: HOSPITAL | Age: 80
DRG: 286 | End: 2020-02-04
Attending: EMERGENCY MEDICINE
Payer: MEDICARE

## 2020-02-04 ENCOUNTER — APPOINTMENT (OUTPATIENT)
Dept: NUCLEAR MEDICINE | Facility: HOSPITAL | Age: 80
DRG: 286 | End: 2020-02-04
Attending: INTERNAL MEDICINE
Payer: MEDICARE

## 2020-02-04 ENCOUNTER — APPOINTMENT (OUTPATIENT)
Dept: ULTRASOUND IMAGING | Facility: HOSPITAL | Age: 80
DRG: 286 | End: 2020-02-04
Attending: INTERNAL MEDICINE
Payer: MEDICARE

## 2020-02-04 ENCOUNTER — HOSPITAL ENCOUNTER (INPATIENT)
Facility: HOSPITAL | Age: 80
LOS: 2 days | Discharge: HOME | DRG: 286 | End: 2020-02-06
Attending: EMERGENCY MEDICINE | Admitting: INTERNAL MEDICINE
Payer: MEDICARE

## 2020-02-04 ENCOUNTER — APPOINTMENT (OUTPATIENT)
Dept: CV DIAGNOSTICS | Facility: HOSPITAL | Age: 80
DRG: 286 | End: 2020-02-04
Attending: INTERNAL MEDICINE
Payer: MEDICARE

## 2020-02-04 DIAGNOSIS — R09.02 HYPOXIA: ICD-10-CM

## 2020-02-04 DIAGNOSIS — I50.9 ACUTE ON CHRONIC CONGESTIVE HEART FAILURE, UNSPECIFIED HEART FAILURE TYPE (HCC): Primary | ICD-10-CM

## 2020-02-04 DIAGNOSIS — I44.7 LEFT BUNDLE BRANCH BLOCK (LBBB) ON ELECTROCARDIOGRAM: ICD-10-CM

## 2020-02-04 LAB
ADENOVIRUS PCR:: NEGATIVE
ANION GAP SERPL CALC-SCNC: 7 MMOL/L (ref 0–18)
ANION GAP SERPL CALC-SCNC: 7 MMOL/L (ref 0–18)
B PERT DNA SPEC QL NAA+PROBE: NEGATIVE
BASOPHILS # BLD AUTO: 0.01 X10(3) UL (ref 0–0.2)
BASOPHILS # BLD AUTO: 0.05 X10(3) UL (ref 0–0.2)
BASOPHILS NFR BLD AUTO: 0.1 %
BASOPHILS NFR BLD AUTO: 0.6 %
BILIRUB UR QL: NEGATIVE
BUN BLD-MCNC: 11 MG/DL (ref 7–18)
BUN BLD-MCNC: 12 MG/DL (ref 7–18)
BUN/CREAT SERPL: 10.4 (ref 10–20)
BUN/CREAT SERPL: 9.8 (ref 10–20)
C PNEUM DNA SPEC QL NAA+PROBE: NEGATIVE
CALCIUM BLD-MCNC: 8.6 MG/DL (ref 8.5–10.1)
CALCIUM BLD-MCNC: 9 MG/DL (ref 8.5–10.1)
CHLORIDE SERPL-SCNC: 104 MMOL/L (ref 98–112)
CHLORIDE SERPL-SCNC: 106 MMOL/L (ref 98–112)
CHOLEST SERPL-MCNC: 157 MG/DL
CHOLEST SMN-MCNC: 157 MG/DL (ref ?–200)
CLARITY UR: CLEAR
CO2 SERPL-SCNC: 29 MMOL/L (ref 21–32)
CO2 SERPL-SCNC: 29 MMOL/L (ref 21–32)
COLOR UR: COLORLESS
CORONAVIRUS 229E PCR:: NEGATIVE
CORONAVIRUS HKU1 PCR:: NEGATIVE
CORONAVIRUS NL63 PCR:: NEGATIVE
CORONAVIRUS OC43 PCR:: NEGATIVE
CREAT BLD-MCNC: 1.06 MG/DL (ref 0.55–1.02)
CREAT BLD-MCNC: 1.22 MG/DL (ref 0.55–1.02)
D DIMER PPP FEU-MCNC: 2.15 UG/ML FEU (ref ?–0.77)
DEPRECATED RDW RBC AUTO: 47.5 FL (ref 35.1–46.3)
DEPRECATED RDW RBC AUTO: 49.3 FL (ref 35.1–46.3)
EOSINOPHIL # BLD AUTO: 0.01 X10(3) UL (ref 0–0.7)
EOSINOPHIL # BLD AUTO: 0.29 X10(3) UL (ref 0–0.7)
EOSINOPHIL NFR BLD AUTO: 0.1 %
EOSINOPHIL NFR BLD AUTO: 3.6 %
ERYTHROCYTE [DISTWIDTH] IN BLOOD BY AUTOMATED COUNT: 15.1 % (ref 11–15)
ERYTHROCYTE [DISTWIDTH] IN BLOOD BY AUTOMATED COUNT: 15.1 % (ref 11–15)
EST. AVERAGE GLUCOSE BLD GHB EST-MCNC: 123 MG/DL (ref 68–126)
FLUAV + FLUBV RNA SPEC NAA+PROBE: NEGATIVE
FLUAV RNA SPEC QL NAA+PROBE: NEGATIVE
FLUBV RNA SPEC QL NAA+PROBE: NEGATIVE
GLUCOSE BLD-MCNC: 176 MG/DL (ref 70–99)
GLUCOSE BLD-MCNC: 178 MG/DL (ref 70–99)
GLUCOSE UR-MCNC: NEGATIVE MG/DL
HBA1C MFR BLD HPLC: 5.9 % (ref ?–5.7)
HCT VFR BLD AUTO: 38.1 % (ref 35–48)
HCT VFR BLD AUTO: 38.5 % (ref 35–48)
HDLC SERPL-MCNC: 47 MG/DL
HDLC SERPL-MCNC: 47 MG/DL (ref 40–59)
HGB BLD-MCNC: 12.1 G/DL (ref 12–16)
HGB BLD-MCNC: 12.2 G/DL (ref 12–16)
HGB UR QL STRIP.AUTO: NEGATIVE
IMM GRANULOCYTES # BLD AUTO: 0.02 X10(3) UL (ref 0–1)
IMM GRANULOCYTES # BLD AUTO: 0.02 X10(3) UL (ref 0–1)
IMM GRANULOCYTES NFR BLD: 0.2 %
IMM GRANULOCYTES NFR BLD: 0.2 %
KETONES UR-MCNC: NEGATIVE MG/DL
LACTATE SERPL-SCNC: 2.5 MMOL/L (ref 0.4–2)
LACTATE SERPL-SCNC: 2.7 MMOL/L (ref 0.4–2)
LACTATE SERPL-SCNC: 3.3 MMOL/L (ref 0.4–2)
LDLC SERPL CALC-MCNC: 88 MG/DL
LDLC SERPL CALC-MCNC: 88 MG/DL (ref ?–100)
LEUKOCYTE ESTERASE UR QL STRIP.AUTO: NEGATIVE
LYMPHOCYTES # BLD AUTO: 0.5 X10(3) UL (ref 1–4)
LYMPHOCYTES # BLD AUTO: 3.03 X10(3) UL (ref 1–4)
LYMPHOCYTES NFR BLD AUTO: 37.3 %
LYMPHOCYTES NFR BLD AUTO: 4.8 %
MCH RBC QN AUTO: 27.7 PG (ref 26–34)
MCH RBC QN AUTO: 28.1 PG (ref 26–34)
MCHC RBC AUTO-ENTMCNC: 31.4 G/DL (ref 31–37)
MCHC RBC AUTO-ENTMCNC: 32 G/DL (ref 31–37)
MCV RBC AUTO: 86.6 FL (ref 80–100)
MCV RBC AUTO: 89.3 FL (ref 80–100)
METAPNEUMOVIRUS PCR:: NEGATIVE
MONOCYTES # BLD AUTO: 0.11 X10(3) UL (ref 0.1–1)
MONOCYTES # BLD AUTO: 0.55 X10(3) UL (ref 0.1–1)
MONOCYTES NFR BLD AUTO: 1.1 %
MONOCYTES NFR BLD AUTO: 6.8 %
MYCOPLASMA PNEUMONIA PCR:: NEGATIVE
NEUTROPHILS # BLD AUTO: 4.18 X10 (3) UL (ref 1.5–7.7)
NEUTROPHILS # BLD AUTO: 4.18 X10(3) UL (ref 1.5–7.7)
NEUTROPHILS # BLD AUTO: 9.71 X10 (3) UL (ref 1.5–7.7)
NEUTROPHILS # BLD AUTO: 9.71 X10(3) UL (ref 1.5–7.7)
NEUTROPHILS NFR BLD AUTO: 51.5 %
NEUTROPHILS NFR BLD AUTO: 93.7 %
NITRITE UR QL STRIP.AUTO: NEGATIVE
NONHDLC SERPL-MCNC: 110 MG/DL
NONHDLC SERPL-MCNC: 110 MG/DL (ref ?–130)
NT-PROBNP SERPL-MCNC: 3654 PG/ML (ref ?–450)
OSMOLALITY SERPL CALC.SUM OF ELEC: 294 MOSM/KG (ref 275–295)
OSMOLALITY SERPL CALC.SUM OF ELEC: 298 MOSM/KG (ref 275–295)
PARAINFLUENZA 1 PCR:: NEGATIVE
PARAINFLUENZA 2 PCR:: NEGATIVE
PARAINFLUENZA 3 PCR:: NEGATIVE
PARAINFLUENZA 4 PCR:: NEGATIVE
PH UR: 7 [PH] (ref 5–8)
PLATELET # BLD AUTO: 204 10(3)UL (ref 150–450)
PLATELET # BLD AUTO: 220 10(3)UL (ref 150–450)
POTASSIUM SERPL-SCNC: 3.1 MMOL/L (ref 3.5–5.1)
POTASSIUM SERPL-SCNC: 3.2 MMOL/L (ref 3.5–5.1)
POTASSIUM SERPL-SCNC: 4.5 MMOL/L (ref 3.5–5.1)
PROT UR-MCNC: NEGATIVE MG/DL
RBC # BLD AUTO: 4.31 X10(6)UL (ref 3.8–5.3)
RBC # BLD AUTO: 4.4 X10(6)UL (ref 3.8–5.3)
RHINOVIRUS/ENTERO PCR:: NEGATIVE
RSV RNA SPEC QL NAA+PROBE: NEGATIVE
SODIUM SERPL-SCNC: 140 MMOL/L (ref 136–145)
SODIUM SERPL-SCNC: 142 MMOL/L (ref 136–145)
SP GR UR STRIP: 1.01 (ref 1–1.03)
T4 FREE SERPL-MCNC: 1.2 NG/DL (ref 0.8–1.7)
TRIGL SERPL-MCNC: 112 MG/DL
TRIGL SERPL-MCNC: 112 MG/DL (ref 30–149)
TROPONIN I SERPL-MCNC: <0.045 NG/ML (ref ?–0.04)
TSI SER-ACNC: 2.56 MIU/ML (ref 0.36–3.74)
UROBILINOGEN UR STRIP-ACNC: <2
VLDLC SERPL CALC-MCNC: 22 MG/DL
VLDLC SERPL CALC-MCNC: 22 MG/DL (ref 0–30)
WBC # BLD AUTO: 10.4 X10(3) UL (ref 4–11)
WBC # BLD AUTO: 8.1 X10(3) UL (ref 4–11)

## 2020-02-04 PROCEDURE — 93306 TTE W/DOPPLER COMPLETE: CPT | Performed by: INTERNAL MEDICINE

## 2020-02-04 PROCEDURE — 78582 LUNG VENTILAT&PERFUS IMAGING: CPT | Performed by: INTERNAL MEDICINE

## 2020-02-04 PROCEDURE — 93970 EXTREMITY STUDY: CPT | Performed by: INTERNAL MEDICINE

## 2020-02-04 PROCEDURE — 71045 X-RAY EXAM CHEST 1 VIEW: CPT | Performed by: EMERGENCY MEDICINE

## 2020-02-04 PROCEDURE — 99223 1ST HOSP IP/OBS HIGH 75: CPT | Performed by: INTERNAL MEDICINE

## 2020-02-04 PROCEDURE — 99222 1ST HOSP IP/OBS MODERATE 55: CPT | Performed by: INTERNAL MEDICINE

## 2020-02-04 RX ORDER — NITROGLYCERIN 0.4 MG/1
0.4 TABLET SUBLINGUAL EVERY 5 MIN PRN
Status: DISCONTINUED | OUTPATIENT
Start: 2020-02-04 | End: 2020-02-06

## 2020-02-04 RX ORDER — PANTOPRAZOLE SODIUM 40 MG/1
40 TABLET, DELAYED RELEASE ORAL
Status: DISCONTINUED | OUTPATIENT
Start: 2020-02-04 | End: 2020-02-06

## 2020-02-04 RX ORDER — ALBUTEROL SULFATE 90 UG/1
2 AEROSOL, METERED RESPIRATORY (INHALATION) EVERY 4 HOURS PRN
Status: DISCONTINUED | OUTPATIENT
Start: 2020-02-04 | End: 2020-02-06

## 2020-02-04 RX ORDER — SODIUM PHOSPHATE, DIBASIC AND SODIUM PHOSPHATE, MONOBASIC 7; 19 G/133ML; G/133ML
1 ENEMA RECTAL ONCE AS NEEDED
Status: DISCONTINUED | OUTPATIENT
Start: 2020-02-04 | End: 2020-02-06

## 2020-02-04 RX ORDER — POTASSIUM CHLORIDE 20 MEQ/1
40 TABLET, EXTENDED RELEASE ORAL EVERY 4 HOURS
Status: COMPLETED | OUTPATIENT
Start: 2020-02-04 | End: 2020-02-04

## 2020-02-04 RX ORDER — IPRATROPIUM BROMIDE AND ALBUTEROL SULFATE 2.5; .5 MG/3ML; MG/3ML
3 SOLUTION RESPIRATORY (INHALATION) ONCE
Status: COMPLETED | OUTPATIENT
Start: 2020-02-04 | End: 2020-02-04

## 2020-02-04 RX ORDER — ASPIRIN 325 MG
325 TABLET ORAL DAILY
Status: DISCONTINUED | OUTPATIENT
Start: 2020-02-05 | End: 2020-02-06

## 2020-02-04 RX ORDER — BISACODYL 10 MG
10 SUPPOSITORY, RECTAL RECTAL
Status: DISCONTINUED | OUTPATIENT
Start: 2020-02-04 | End: 2020-02-06

## 2020-02-04 RX ORDER — METOPROLOL SUCCINATE 50 MG/1
50 TABLET, EXTENDED RELEASE ORAL 2 TIMES DAILY
Status: DISCONTINUED | OUTPATIENT
Start: 2020-02-04 | End: 2020-02-06

## 2020-02-04 RX ORDER — METHYLPREDNISOLONE SODIUM SUCCINATE 125 MG/2ML
125 INJECTION, POWDER, LYOPHILIZED, FOR SOLUTION INTRAMUSCULAR; INTRAVENOUS ONCE
Status: COMPLETED | OUTPATIENT
Start: 2020-02-04 | End: 2020-02-04

## 2020-02-04 RX ORDER — IPRATROPIUM BROMIDE AND ALBUTEROL SULFATE 2.5; .5 MG/3ML; MG/3ML
3 SOLUTION RESPIRATORY (INHALATION) EVERY 6 HOURS PRN
Status: DISCONTINUED | OUTPATIENT
Start: 2020-02-04 | End: 2020-02-06

## 2020-02-04 RX ORDER — POLYETHYLENE GLYCOL 3350 17 G/17G
17 POWDER, FOR SOLUTION ORAL DAILY PRN
Status: DISCONTINUED | OUTPATIENT
Start: 2020-02-04 | End: 2020-02-06

## 2020-02-04 RX ORDER — LOSARTAN POTASSIUM 50 MG/1
50 TABLET ORAL 2 TIMES DAILY
Status: DISCONTINUED | OUTPATIENT
Start: 2020-02-04 | End: 2020-02-06

## 2020-02-04 RX ORDER — LISINOPRIL 40 MG/1
40 TABLET ORAL DAILY
Status: DISCONTINUED | OUTPATIENT
Start: 2020-02-04 | End: 2020-02-04

## 2020-02-04 RX ORDER — NITROGLYCERIN 0.4 MG/1
0.4 TABLET SUBLINGUAL EVERY 5 MIN PRN
Status: DISCONTINUED | OUTPATIENT
Start: 2020-02-04 | End: 2020-02-04

## 2020-02-04 RX ORDER — MAGNESIUM SULFATE HEPTAHYDRATE 40 MG/ML
2 INJECTION, SOLUTION INTRAVENOUS ONCE
Status: COMPLETED | OUTPATIENT
Start: 2020-02-04 | End: 2020-02-04

## 2020-02-04 RX ORDER — ALLOPURINOL 300 MG/1
300 TABLET ORAL DAILY
Status: DISCONTINUED | OUTPATIENT
Start: 2020-02-04 | End: 2020-02-06

## 2020-02-04 RX ORDER — HEPARIN SODIUM 5000 [USP'U]/ML
5000 INJECTION, SOLUTION INTRAVENOUS; SUBCUTANEOUS EVERY 8 HOURS SCHEDULED
Status: DISCONTINUED | OUTPATIENT
Start: 2020-02-04 | End: 2020-02-06

## 2020-02-04 RX ORDER — ATORVASTATIN CALCIUM 40 MG/1
40 TABLET, FILM COATED ORAL NIGHTLY
Status: DISCONTINUED | OUTPATIENT
Start: 2020-02-04 | End: 2020-02-06

## 2020-02-04 RX ORDER — ACETAMINOPHEN 325 MG/1
650 TABLET ORAL EVERY 6 HOURS PRN
Status: DISCONTINUED | OUTPATIENT
Start: 2020-02-04 | End: 2020-02-06

## 2020-02-04 RX ORDER — ASPIRIN 81 MG/1
81 TABLET, CHEWABLE ORAL DAILY
Status: DISCONTINUED | OUTPATIENT
Start: 2020-02-04 | End: 2020-02-04

## 2020-02-04 RX ORDER — LEVOFLOXACIN 5 MG/ML
750 INJECTION, SOLUTION INTRAVENOUS
Status: DISCONTINUED | OUTPATIENT
Start: 2020-02-04 | End: 2020-02-05

## 2020-02-04 RX ORDER — FUROSEMIDE 10 MG/ML
40 INJECTION INTRAMUSCULAR; INTRAVENOUS EVERY 12 HOURS
Status: DISCONTINUED | OUTPATIENT
Start: 2020-02-04 | End: 2020-02-06

## 2020-02-04 RX ORDER — FUROSEMIDE 10 MG/ML
40 INJECTION INTRAMUSCULAR; INTRAVENOUS ONCE
Status: COMPLETED | OUTPATIENT
Start: 2020-02-04 | End: 2020-02-04

## 2020-02-04 NOTE — PLAN OF CARE
Problem: Patient/Family Goals  Goal: Patient/Family Long Term Goal  Description  Patient's Long Term Goal: home at baseline health    Interventions:  - f/u with pcp  -take medications as prescribed by physician    - See additional Care Plan goals for spe Cessation handout, if applicable  - Encourage broncho-pulmonary hygiene including cough, deep breathe, Incentive Spirometry  - Assess the need for suctioning and perform as needed  - Assess and instruct to report SOB or any respiratory difficulty  - Respir assistance with activity based on assessment  - Modify environment to reduce risk of injury  - Provide assistive devices as appropriate  - Consider OT/PT consult to assist with strengthening/mobility  - Encourage toileting schedule  Outcome: Progressing

## 2020-02-04 NOTE — CM/SW NOTE
LUCILLE received MDO for discharge planning. LUCILLE met with pt and family at bedside. Pt reports that she lives home alone and is independent at baseline. She states she does not use any DME for ambulation. Family is local and able to assist as needed.      LUCILLE inqu

## 2020-02-04 NOTE — PROGRESS NOTES
[facility] Pharmacy Note:  Therapeutic Interchange    Kartik Greer was previously taking valsartan 80 mg PO q12h at home prior to admission. Per therapeutic interchange, the patient will be switched to losartan 50 mg PO q12h during hospitalization.     The ivy

## 2020-02-04 NOTE — CONSULTS
[facility] - [facility]    Cardiology Consultation  [facility]    Tari Brochure Patient Status:  Inpatient    1940 MRN [id number]   Location [facility]5W Attending Boo Pope MD   Hosp Day # 0 PCP Julisa Kang HISTORY      LASER SURGERY ON GALL BLADDER STONES   • THYROIDECTOMY  8/31/15    Total thyroidectomy. 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         Family History  See below  Family History   Problem Relation Age of Onset   • Hypertension Mother    • 3 mL, Nebulization, Q6H PRN  Atropine Sulfate 0.1 MG/ML injection 0.5 mg, 0.5 mg, Intravenous, PRN  nitroGLYCERIN (NITROSTAT) SL tab 0.4 mg, 0.4 mg, Sublingual, Q5 Min PRN  acetaminophen (TYLENOL) tab 650 mg, 650 mg, Oral, Q6H PRN  PEG 3350 (MIRALAX) powde Nausea. Allergies    Erythromycin            HIVES    Review of Systems:   See below  Review of Systems   Constitutional: Negative. HENT: Negative. Eyes: Negative. Respiratory: Positive for shortness of breath.  Negative for sputum productio levofloxacin  750 mg Intravenous Q48H   • Heparin Sodium (Porcine)  5,000 Units Subcutaneous Q8H [facility]   • allopurinol  300 mg Oral Daily   • atorvastatin  40 mg Oral Nightly   • lisinopril  40 mg Oral Daily   • cholecalciferol  2,000 Units Oral Daily   • Darlene Patrick ESRML 83 (H) 12/31/2019    MG 2.2 09/14/2015    TROP <0.045 02/04/2020    B12 1,089 (H) 09/18/2018         Recent Labs   Lab 02/04/20  0029 02/04/20  0503   * 178*   BUN 11 12   CREATSERUM 1.06* 1.22*   GFRAA 59* 49*   GFRNAA 51* 43*   CA 8.6 9.0

## 2020-02-04 NOTE — ED PROVIDER NOTES
Patient Seen in: [facility] AND [facility] Emergency Department      History   Patient presents with:  Dyspnea CEDRICK SOB    Stated Complaint: sob    HPI    44-year-old female presents the ER with complaint of shortness of breath since yesterday.   Patient states sh Nose normal.   Eyes:      Extraocular Movements: Extraocular movements intact. Conjunctiva/sclera: Conjunctivae normal.      Pupils: Pupils are equal, round, and reactive to light. Neck:      Musculoskeletal: Normal range of motion and neck supple. NATRIURETIC PEPTIDE   LACTIC ACID, PLASMA   RAINBOW DRAW BLUE   RAINBOW DRAW LAVENDER   RAINBOW DRAW LIGHT GREEN   RAINBOW DRAW GOLD   BLOOD CULTURE   BLOOD CULTURE     EKG    Rate, intervals and axes as noted on EKG Report.   Rate: 118  Rhythm: Sinus tachy basic health screening including reassessment of your blood pressure.     Medications Prescribed:  Current Discharge Medication List                   Present on Admission  Date Reviewed: 12/17/2019          ICD-10-CM Noted POA    Acute on chronic congestiv

## 2020-02-04 NOTE — PROGRESS NOTES
[facility] Pharmacy Note:  Renal Adjustment for levofloxacin (Ilya Gonzalez)    Emanuel Ham is a 70year old female who has been prescribed levofloxacin (LEVAQUIN) 500 mg every 24 hrs.   CrCl is estimated creatinine clearance is 38.4 mL/min (A) (based on SCr of 1.06 mg

## 2020-02-04 NOTE — PROGRESS NOTES
[facility]  Antimicrobial Stewardship Duplicate Coverage Recommendation Note    Dane Duke is a 70year old female    Current Antimicrobial Medications  Anti-infectives (From admission, onward)      Start     Dose/Rate Route Frequency Ordered Stop

## 2020-02-04 NOTE — CONSULTS
[facility] - [facility]    Report of Consultation    Barbie Ribeiro Patient Status:  Inpatient    1940 MRN [id number]   Location [facility]5W Attending Bernardo Ribeiro MD   Hosp Day # 0 PCP Ramya Conde MD     Date of Admission:   BLADDER STONES   • THYROIDECTOMY  8/31/15    Total thyroidectomy. 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 Family History  Family History   Problem Relation Age of Onset   • Hypertension Mother    • Heart Disease Mother 36        CAD S/P MI. 300 MG Oral Tab, TAKE 1 TABLET(300 MG) BY MOUTH EVERY DAY  Benazepril HCl 40 MG Oral Tab, Take 1 tablet (40 mg total) by mouth daily.   LEVOTHYROXINE SODIUM 125 MCG Oral Tab, TAKE 1 TABLET(125 MCG) BY MOUTH BEFORE BREAKFAST  hydrochlorothiazide 12.5 MG Oral focal deficit        Results:     Laboratory Data:  Lab Results   Component Value Date    WBC 10.4 02/04/2020    HGB 12.2 02/04/2020    HCT 38.1 02/04/2020    .0 02/04/2020    CREATSERUM 1.22 (H) 02/04/2020    BUN 12 02/04/2020     02/04/2020 Heparin sq            Thank you for allowing me to participate in the care of your patient. Kathleen Rodrigues.  Brinda  2/4/2020

## 2020-02-04 NOTE — H&P
[facility] - [facility]    History and Physical    Yuly Charles Patient Status:  Inpatient    1940 MRN [id number]   Location [facility]5W Attending Haroon Nick MD   Hosp Day # 0 PCP Giuliana Camarillo MD     Date:  2020  Date of Used    Alcohol use: Yes      Comment: occ    Drug use: No    Allergies/Medications:    Allergies:   Erythromycin            HIVES  ALLOPURINOL 300 MG Oral Tab, TAKE 1 TABLET(300 MG) BY MOUTH EVERY DAY  Benazepril HCl 40 MG Oral Tab, Take 1 tablet (40 mg to discharge, redness, itching and visual disturbance. Respiratory: Positive for cough (Mild dry just today in ER per pt. ), shortness of breath and wheezing. Negative for apnea, hemoptysis, sputum production, choking, chest tightness and stridor.     Cardio uvula swelling or lacerations. No oropharyngeal exudate, posterior oropharyngeal edema, posterior oropharyngeal erythema or tonsillar abscesses. Eyes: Pupils are equal, round, and reactive to light.  Conjunctivae, EOM and lids are normal. Right eye exhibi adenopathy present. Right: No supraclavicular adenopathy present. Left: No supraclavicular adenopathy present. Neurological: She is alert and oriented to person, place, and time. Skin: Skin is warm and dry. No lesion and no rash noted.  Sh overload. No edema. Cardiology consulted. Echo pending. Strict I's and O's. Daily weights. Fluid restriction. Lasix IV. Monitor electrolytes and kidney function. Electrolyte protocol. Hypoxia  May be from above. Acute onset. Will rule out.

## 2020-02-04 NOTE — PLAN OF CARE
Problem: Patient/Family Goals  Goal: Patient/Family Long Term Goal  Description  Patient's Long Term Goal: home at baseline health    Interventions:  - f/u with pcp  -take medications as prescribed by physician    - See additional Care Plan goals for spe Cessation handout, if applicable  - Encourage broncho-pulmonary hygiene including cough, deep breathe, Incentive Spirometry  - Assess the need for suctioning and perform as needed  - Assess and instruct to report SOB or any respiratory difficulty  - Respir assistance with activity based on assessment  - Modify environment to reduce risk of injury  - Provide assistive devices as appropriate  - Consider OT/PT consult to assist with strengthening/mobility  - Encourage toileting schedule  Outcome: Progressing  P

## 2020-02-05 ENCOUNTER — APPOINTMENT (OUTPATIENT)
Dept: INTERVENTIONAL RADIOLOGY/VASCULAR | Facility: HOSPITAL | Age: 80
DRG: 286 | End: 2020-02-05
Attending: NURSE PRACTITIONER
Payer: MEDICARE

## 2020-02-05 LAB
ANION GAP SERPL CALC-SCNC: 7 MMOL/L (ref 0–18)
BUN BLD-MCNC: 23 MG/DL (ref 7–18)
BUN/CREAT SERPL: 18.1 (ref 10–20)
CALCIUM BLD-MCNC: 8.9 MG/DL (ref 8.5–10.1)
CHLORIDE SERPL-SCNC: 105 MMOL/L (ref 98–112)
CO2 SERPL-SCNC: 30 MMOL/L (ref 21–32)
CREAT BLD-MCNC: 1.27 MG/DL (ref 0.55–1.02)
DEPRECATED RDW RBC AUTO: 49.5 FL (ref 35.1–46.3)
ERYTHROCYTE [DISTWIDTH] IN BLOOD BY AUTOMATED COUNT: 15.6 % (ref 11–15)
GLUCOSE BLD-MCNC: 99 MG/DL (ref 70–99)
HAV IGM SER QL: 2.1 MG/DL (ref 1.6–2.6)
HCT VFR BLD AUTO: 34.3 % (ref 35–48)
HGB BLD-MCNC: 10.9 G/DL (ref 12–16)
L PNEUMO AG UR QL: NEGATIVE
MCH RBC QN AUTO: 27.9 PG (ref 26–34)
MCHC RBC AUTO-ENTMCNC: 31.8 G/DL (ref 31–37)
MCV RBC AUTO: 87.9 FL (ref 80–100)
OSMOLALITY SERPL CALC.SUM OF ELEC: 298 MOSM/KG (ref 275–295)
PLATELET # BLD AUTO: 211 10(3)UL (ref 150–450)
POTASSIUM SERPL-SCNC: 3.3 MMOL/L (ref 3.5–5.1)
POTASSIUM SERPL-SCNC: 4 MMOL/L (ref 3.5–5.1)
RBC # BLD AUTO: 3.9 X10(6)UL (ref 3.8–5.3)
SODIUM SERPL-SCNC: 142 MMOL/L (ref 136–145)
STREP PNEUMO ANTIGEN, URINE: NEGATIVE
WBC # BLD AUTO: 10.5 X10(3) UL (ref 4–11)

## 2020-02-05 PROCEDURE — 99233 SBSQ HOSP IP/OBS HIGH 50: CPT | Performed by: INTERNAL MEDICINE

## 2020-02-05 PROCEDURE — B2111ZZ FLUOROSCOPY OF MULTIPLE CORONARY ARTERIES USING LOW OSMOLAR CONTRAST: ICD-10-PCS | Performed by: INTERNAL MEDICINE

## 2020-02-05 PROCEDURE — 4A023N8 MEASUREMENT OF CARDIAC SAMPLING AND PRESSURE, BILATERAL, PERCUTANEOUS APPROACH: ICD-10-PCS | Performed by: INTERNAL MEDICINE

## 2020-02-05 PROCEDURE — B2151ZZ FLUOROSCOPY OF LEFT HEART USING LOW OSMOLAR CONTRAST: ICD-10-PCS | Performed by: INTERNAL MEDICINE

## 2020-02-05 PROCEDURE — 99232 SBSQ HOSP IP/OBS MODERATE 35: CPT | Performed by: INTERNAL MEDICINE

## 2020-02-05 RX ORDER — MIDAZOLAM HYDROCHLORIDE 1 MG/ML
INJECTION INTRAMUSCULAR; INTRAVENOUS
Status: COMPLETED
Start: 2020-02-05 | End: 2020-02-05

## 2020-02-05 RX ORDER — SODIUM CHLORIDE 9 MG/ML
INJECTION, SOLUTION INTRAVENOUS
Status: ACTIVE | OUTPATIENT
Start: 2020-02-06 | End: 2020-02-06

## 2020-02-05 RX ORDER — POTASSIUM CHLORIDE 20 MEQ/1
40 TABLET, EXTENDED RELEASE ORAL EVERY 4 HOURS
Status: DISPENSED | OUTPATIENT
Start: 2020-02-05 | End: 2020-02-05

## 2020-02-05 RX ORDER — CHLORHEXIDINE GLUCONATE 4 G/100ML
30 SOLUTION TOPICAL
Status: ACTIVE | OUTPATIENT
Start: 2020-02-06 | End: 2020-02-06

## 2020-02-05 RX ORDER — LIDOCAINE HYDROCHLORIDE 20 MG/ML
INJECTION, SOLUTION EPIDURAL; INFILTRATION; INTRACAUDAL; PERINEURAL
Status: COMPLETED
Start: 2020-02-05 | End: 2020-02-05

## 2020-02-05 RX ORDER — ACETAMINOPHEN 325 MG/1
650 TABLET ORAL EVERY 6 HOURS PRN
Status: DISCONTINUED | OUTPATIENT
Start: 2020-02-05 | End: 2020-02-06

## 2020-02-05 RX ORDER — METOCLOPRAMIDE 10 MG/1
5 TABLET ORAL EVERY 6 HOURS PRN
Status: DISCONTINUED | OUTPATIENT
Start: 2020-02-05 | End: 2020-02-06

## 2020-02-05 NOTE — PROGRESS NOTES
[facility]OSP - [facility]    Cardiology Progress Note  [facility]    Radha Ricci Patient Status:  Inpatient    1940 MRN [id number]   Location [facility]5W Attending Zackary Carrel., MD   Hosp Day # 1 PCP Travis Espinosa 02/05/2020    HGB 10.9 (L) 02/05/2020    HCT 34.3 (L) 02/05/2020    .0 02/05/2020    CREATSERUM 1.22 (H) 02/04/2020    BUN 12 02/04/2020     02/04/2020    K 4.5 02/04/2020     02/04/2020    CO2 29.0 02/04/2020     (H) 02/04/2020 correlate for symptoms of pneumonia. [facility] provided a preliminary report for this examination. This final report agrees with their preliminary findings.    Dictated by (CST): Sandro Werner MD on 2/04/2020 at 6:15     Approved by (CST): Sandro Werner

## 2020-02-05 NOTE — PHYSICAL THERAPY NOTE
Attempted to see patient for physical therapy evaluation. Patient to go to cardiac cath. Will attempt to see patient tomorrow for physical therapy evaluation. RN aware and agreeable.

## 2020-02-05 NOTE — PLAN OF CARE
Discussed left heart cardiac catheterization procedure with the patient. The risks and benefits of the procedure were explained to the patient.   This includes but is not limited to a 1-3% risk of stroke, death, heart attack, coronary dissection requiring e

## 2020-02-05 NOTE — PROGRESS NOTES
[facility]    Progress Note    Dariel Gonzales Patient Status:  Inpatient    1940 MRN [id number]   Location [facility]5W Attending Deacon Infante MD   Hosp Day # 1 PCP Jayme Castleman.  Saurabh Clark MD        Subjective:     Patient se 02/05/2020    .0 02/05/2020    CREATSERUM 1.27 (H) 02/05/2020    BUN 23 (H) 02/05/2020     02/05/2020    K 3.3 (L) 02/05/2020     02/05/2020    CO2 30.0 02/05/2020    GLU 99 02/05/2020    CA 8.9 02/05/2020    ALB 2.95 (L) 12/31/2019    A change Electronically signed on 02/04/2020 at 15:11 by Maeve Becker 12-lead    Result Date: 2/4/2020  ECG Report  Interpretation  -------------------------- sinus tachycardia -Left bundle branch block.  ABNORMAL When compared with ECG of 08/31/2015 0

## 2020-02-05 NOTE — PROGRESS NOTES
[facility]    Progress Note    Emanuel Ham Patient Status:  Inpatient    1940 MRN [id number]   Location [facility]5W Attending Ramírez Whiting MD   Hosp Day # 1 PCP Rosemary Morin.  Janessa Mohr MD        Subjective:     Constituti 02/05/2020    CA 8.9 02/05/2020    ALB 2.95 (L) 12/31/2019    ALKPHO 164 (H) 12/02/2019    BILT 0.4 12/02/2019    TP 6.7 12/31/2019    AST 21 12/02/2019    ALT 24 12/02/2019    INR 1.0 08/18/2015    PT 12.8 08/18/2015    T4F 1.2 02/04/2020    TSH 2.560 02/ branch block. ABNORMAL When compared with ECG of 08/31/2015 09:58:45 tachycardia new Electronically signed on 02/04/2020 at 10:25 by Nathalia Tiwari.  Edda Malave MD  2/5/2020

## 2020-02-05 NOTE — OCCUPATIONAL THERAPY NOTE
Orders received, chart reviewed. Pt scheduled for cardiac catheterization procedure this afternoon. Spoke with RN who reported pt has been self-care. Spoke with patient at bedside and pt reports no concerns at this time.  Will f/u with occupational therapy

## 2020-02-05 NOTE — PROGRESS NOTES
Cardiology follow-up    Did not realize patient already had echocardiogram.  Was just informed preliminarily the patient has severe left ventricular dysfunction. Will need some sort of ischemic work-up prior to discharge.   Heart rate is too high to do CTA

## 2020-02-05 NOTE — PROCEDURES
Preop: LV dysfunction  Postop: LV dysfunction, pulmonary hypertension, congestive heart failure. Procedure: Right and left heart cath; LV and coronary angiogram.  Angio-Seal closure to RFA. Findings:  Mean RA 13, PA 62/35, wedge 24, LVEDP 33.   Left arleth

## 2020-02-05 NOTE — PROCEDURES
[facility]    PATIENT'S NAME: Elver Mosqueda   ATTENDING PHYSICIAN: Ana Preston MD   OPERATING PHYSICIAN: Ismael Bedoya MD   PATIENT ACCOUNT#:   [id number]    LOCATION:  [place] [address]  MEDICAL RECORD #:   [id number]       DATE OF ejection fraction is 25%. There is at least moderate mitral regurgitation. LEFT CORONARY ANGIOGRAPHY:  The left main coronary artery is normal and bifurcates into the LAD and circumflex. The LAD is free of disease and extends out to the cardiac apex. child(violeta)

## 2020-02-05 NOTE — CARDIAC REHAB
CARDIAC REHAB HEART FAILURE EDUCATION    Handouts provided and reviewed: Caring For Your Heart Booklet and CHF Booklet. Disease Process: Disease process reviewed. Angiogram procedure reviewed. Questions answered.     Reviewed the following: DAILY W

## 2020-02-06 ENCOUNTER — APPOINTMENT (OUTPATIENT)
Dept: CT IMAGING | Facility: HOSPITAL | Age: 80
DRG: 286 | End: 2020-02-06
Attending: INTERNAL MEDICINE
Payer: MEDICARE

## 2020-02-06 VITALS
SYSTOLIC BLOOD PRESSURE: 114 MMHG | RESPIRATION RATE: 16 BRPM | HEART RATE: 97 BPM | DIASTOLIC BLOOD PRESSURE: 68 MMHG | OXYGEN SATURATION: 99 % | TEMPERATURE: 98 F | BODY MASS INDEX: 27 KG/M2 | WEIGHT: 157 LBS

## 2020-02-06 PROBLEM — I42.8 NONISCHEMIC CARDIOMYOPATHY (HCC): Status: ACTIVE | Noted: 2020-02-06

## 2020-02-06 PROBLEM — I27.20 PULMONARY HYPERTENSION (HCC): Status: ACTIVE | Noted: 2020-02-06

## 2020-02-06 LAB
ANION GAP SERPL CALC-SCNC: 7 MMOL/L (ref 0–18)
BUN BLD-MCNC: 23 MG/DL (ref 7–18)
BUN/CREAT SERPL: 21.7 (ref 10–20)
CALCIUM BLD-MCNC: 9.4 MG/DL (ref 8.5–10.1)
CHLORIDE SERPL-SCNC: 103 MMOL/L (ref 98–112)
CO2 SERPL-SCNC: 30 MMOL/L (ref 21–32)
CREAT BLD-MCNC: 1.06 MG/DL (ref 0.55–1.02)
GLUCOSE BLD-MCNC: 88 MG/DL (ref 70–99)
HAV IGM SER QL: 2 MG/DL (ref 1.6–2.6)
OSMOLALITY SERPL CALC.SUM OF ELEC: 293 MOSM/KG (ref 275–295)
POTASSIUM SERPL-SCNC: 3.6 MMOL/L (ref 3.5–5.1)
SODIUM SERPL-SCNC: 140 MMOL/L (ref 136–145)

## 2020-02-06 PROCEDURE — 99238 HOSP IP/OBS DSCHRG MGMT 30/<: CPT | Performed by: INTERNAL MEDICINE

## 2020-02-06 PROCEDURE — 70450 CT HEAD/BRAIN W/O DYE: CPT | Performed by: INTERNAL MEDICINE

## 2020-02-06 RX ORDER — FUROSEMIDE 20 MG/1
20 TABLET ORAL DAILY
Status: DISCONTINUED | OUTPATIENT
Start: 2020-02-07 | End: 2020-02-06

## 2020-02-06 RX ORDER — SPIRONOLACTONE 25 MG/1
12.5 TABLET ORAL DAILY
Qty: 15 TABLET | Refills: 3 | Status: ON HOLD | OUTPATIENT
Start: 2020-02-07 | End: 2020-02-12

## 2020-02-06 RX ORDER — TRAMADOL HYDROCHLORIDE 50 MG/1
50 TABLET ORAL EVERY 6 HOURS PRN
Qty: 15 TABLET | Refills: 0 | Status: ON HOLD | OUTPATIENT
Start: 2020-02-06 | End: 2020-02-13

## 2020-02-06 RX ORDER — SPIRONOLACTONE 25 MG/1
12.5 TABLET ORAL DAILY
Status: DISCONTINUED | OUTPATIENT
Start: 2020-02-06 | End: 2020-02-06

## 2020-02-06 RX ORDER — FUROSEMIDE 20 MG/1
20 TABLET ORAL DAILY
Qty: 30 TABLET | Refills: 3 | Status: ON HOLD | OUTPATIENT
Start: 2020-02-07 | End: 2020-02-12

## 2020-02-06 RX ORDER — LOSARTAN POTASSIUM 50 MG/1
50 TABLET ORAL 2 TIMES DAILY
Qty: 60 TABLET | Refills: 0 | Status: SHIPPED | OUTPATIENT
Start: 2020-02-06 | End: 2020-02-06

## 2020-02-06 RX ORDER — TRAMADOL HYDROCHLORIDE 50 MG/1
50 TABLET ORAL ONCE
Status: COMPLETED | OUTPATIENT
Start: 2020-02-06 | End: 2020-02-06

## 2020-02-06 RX ORDER — POTASSIUM CHLORIDE 20 MEQ/1
40 TABLET, EXTENDED RELEASE ORAL EVERY 4 HOURS
Status: COMPLETED | OUTPATIENT
Start: 2020-02-06 | End: 2020-02-06

## 2020-02-06 NOTE — PROGRESS NOTES
[facility]P - [facility]    Cardiology Progress Note  [facility]    Dyllan Ax Patient Status:  Inpatient    1940 MRN [id number]   Location [facility]5W Attending Jeri Solorzano MD   Hosp Day # 2 PCP Bascom Litten. Oral Daily   • atorvastatin  40 mg Oral Nightly   • cholecalciferol  2,000 Units Oral Daily   • Levothyroxine Sodium  125 mcg Oral Before breakfast   • Metoprolol Succinate ER  50 mg Oral BID   • Pantoprazole Sodium  40 mg Oral QAM AC       Continuous Infu Img (cpt=93970)    Result Date: 2/4/2020  CONCLUSION: Normal examination.      Dictated by (CST): Penelope Carney MD on 2/04/2020 at 15:17     Approved by (CST): Penelope Carney MD on 2/04/2020 at 15:18          Nm Lung Vq Vent / Perfusion Scan  (cpt=78582)

## 2020-02-06 NOTE — PROGRESS NOTES
Maryuri Chloe  [id number]  2/5/2020    Post procedure/ recovery hand-off report given to [facility]. Patient's vital signs stable, access site dry and intact, no signs and symptoms of bleeding/ hematoma.     Elidia Chawla RN

## 2020-02-06 NOTE — PHYSICAL THERAPY NOTE
PHYSICAL THERAPY EVALUATION - INPATIENT     Room Number: 806/212-L  Evaluation Date: 2/6/2020  Type of Evaluation: Initial   Physician Order: PT Eval and Treat    Presenting Problem: SOB  Reason for Therapy: Mobility Dysfunction and Discharge Planning level of impairment may benefit from discharge home without services.      DISCHARGE RECOMMENDATIONS  PT Discharge Recommendations: Home    PLAN  PT Treatment Plan: Endurance;Gait training;Stair training;Balance training  Rehab Potential : Good  Frequency ( functional limits     Lower extremity ROM is within functional limits     Lower extremity strength is within functional limits     BALANCE  Static Sitting: Good  Dynamic Sitting: Good  Static Standing: Good  Dynamic Standing: Fair +    ACTIVITY TOLERANCE none     Goal #2  Current Status    Goal #3 Patient is able to ambulate 500 feet with assist device: none at assistance level: independent   Goal #3   Current Status    Goal #4 Patient will negotiate 12 stairs/one curb w/ single rail and supervision   Goal

## 2020-02-06 NOTE — CM/SW NOTE
MDO for Entresto medication. Met with nurse, stated pt already had bought medication through our [address] Per nurse pt is discharging now. CM informed will provide coupon for next refill.     Explained to pt on use of coupon, pt v/u.    CM/LUCILLE to r

## 2020-02-06 NOTE — RESEARCH
[facility] Cardiology Research - Corband study    Pt referred by Dr. Lázaro Jiménez. Study design and how the corband works explained to patient. Pt given consent form to look over. All questions answered. Patient declined study.     Hussain Srinivasan RN  Clinical Res

## 2020-02-06 NOTE — OCCUPATIONAL THERAPY NOTE
Orders received; chart reviewed. Rn informed that patient has severe headache (9/10) and pain. Pt to CT this afternoon. Will f/u with patient as schedule allows for energy conservation and pacing strategies.      Minda Melchor, [address] [place] [address]

## 2020-02-06 NOTE — PLAN OF CARE
Problem: CARDIOVASCULAR - ADULT  Goal: Maintains optimal cardiac output and hemodynamic stability  Description  INTERVENTIONS:  - Monitor vital signs, rhythm, and trends  - Monitor for bleeding, hypotension and signs of decreased cardiac output  - Evalua void and empty bladder  - Monitor intake/output and perform bladder scan as needed  - Follow urinary retention protocol/standard of care  - Consider collaborating with pharmacy to review patient's medication profile  - Implement strategies to promote bladd

## 2020-02-06 NOTE — PLAN OF CARE
- noted dilated (as seen on cath report) cardiomyopathy secondary to Nonischemic cardiomyopathy with EF 25%  - Dr. Drerek Jolley has recommended Lifevest for the patient - see his note  - Lili Aviles approved - patient will be fit with vest prior to d/c home today.

## 2020-02-06 NOTE — DISCHARGE SUMMARY
[address] Patient Status:  Inpatient    1940 MRN [id number]   Location [facility]5W Attending Myesha Kelly MD   Hosp Day # 2 PCP Belia Hernandez.  Imelda Nevarez MD     Date of Admission: 2020  Date of Discharge: 20 Elevated  May be from acute hypoxia. On IV antibiotics for pneumonia. Blood cultures done to x2. Urine is clear.       Hypertension. Generally well controlled. Resume medications and monitor. ACE inhibitor is was stopped due to cough.   Diovan 80 tw Tabs  Commonly known as:  ZYLOPRIM      TAKE 1 TABLET(300 MG) BY MOUTH EVERY DAY   Quantity:  90 tablet  Refills:  1     aspirin 81 MG Tabs      Take 81 mg by mouth daily.    Refills:  0     atorvastatin 40 MG Tabs  Commonly known as:  LIPITOR      TAKE 1 T MOUTH BEFORE BREAKFAST    hydrochlorothiazide 12.5 MG Oral Cap  TAKE 1 CAPSULE BY MOUTH EVERY MORNING    PANTOPRAZOLE SODIUM 40 MG Oral Tab EC  TAKE 1 TABLET BY MOUTH EVERY MORNING BEFORE BREAKFAST    Metoprolol Succinate ER 50 MG Oral Tablet 24 Hr  Take 1

## 2020-02-06 NOTE — PLAN OF CARE
Patient slept well s/p angiogram. Right groin site without complication. Patient up to bathroom independently, no c/o cp/sob. IV lasix given with good urine output. Bed in lowest position, non-skid socks on and call light within reach.  Will continue to mon indicated  - Manage/alleviate anxiety  - Monitor for signs/symptoms of CO2 retention  Outcome: Progressing     Problem: GENITOURINARY - ADULT  Goal: Absence of urinary retention  Description  INTERVENTIONS:  - Assess patient’s ability to void and empty rubio Care  Goal: Patient preferences are identified and integrated in the patient's plan of care  Description  Interventions:  - What would you like us to know as we care for you?  - Provide timely, complete, and accurate information to patient/family  - [employer]

## 2020-02-07 ENCOUNTER — PATIENT OUTREACH (OUTPATIENT)
Dept: CASE MANAGEMENT | Age: 80
End: 2020-02-07

## 2020-02-07 ENCOUNTER — TELEPHONE (OUTPATIENT)
Dept: INTERNAL MEDICINE CLINIC | Facility: CLINIC | Age: 80
End: 2020-02-07

## 2020-02-07 DIAGNOSIS — I50.9 ACUTE ON CHRONIC CONGESTIVE HEART FAILURE, UNSPECIFIED HEART FAILURE TYPE (HCC): ICD-10-CM

## 2020-02-07 DIAGNOSIS — Z02.9 ENCOUNTERS FOR ADMINISTRATIVE PURPOSE: ICD-10-CM

## 2020-02-07 PROCEDURE — 1111F DSCHRG MED/CURRENT MED MERGE: CPT

## 2020-02-07 NOTE — PROGRESS NOTES
Initial Post Discharge Follow Up   Discharge Date: 2/6/20  Contact Date: 2/7/2020    Consent Verification:  Assessment Completed With: Patient  HIPAA Verified?   Yes    Discharge Dx:   Acute on chronic congestive heart failure, unspecified heart failure LEVOTHYROXINE SODIUM 125 MCG Oral Tab TAKE 1 TABLET(125 MCG) BY MOUTH BEFORE BREAKFAST 90 tablet 1   • PANTOPRAZOLE SODIUM 40 MG Oral Tab EC TAKE 1 TABLET BY MOUTH EVERY MORNING BEFORE BREAKFAST 90 tablet 1   • Metoprolol Succinate ER 50 MG Oral Tablet 24 Legs?   no        Needs post D/C:   Now that you are home, are there any needs or concerns you need addressed before your next visit with your PCP?  (DME, meds, disease concerns, Etc): Yes     Follow up appointments:      Your appointments     Date & Time instructed her to go to ER should her symptoms worsen and she agreed. NCM sent TE to PCP office for advise. Med review completed. Patient denied having any questions or concerns at this time.      CCM referral placed:  Yes        [x]  Discharge Summary, Dis

## 2020-02-07 NOTE — TELEPHONE ENCOUNTER
S/w patient for TCM. I was unable to find any availability for TCM appt by 2/20/2020. Please assist. Patient dc'd 2/6/2020, CHF. She states that she developed a 7/10 headache this morning. It is now at a 6/10. She denies any vision changes. She has not weig

## 2020-02-08 ENCOUNTER — TELEPHONE (OUTPATIENT)
Dept: INTERNAL MEDICINE CLINIC | Facility: CLINIC | Age: 80
End: 2020-02-08

## 2020-02-08 NOTE — TELEPHONE ENCOUNTER
Patient was left a message to call back. Transfer to triage dept [id number]    F/u call made regarding her symptoms. I don't see a TCM appt was made nor any availability next week. Dr. Amanda Camarillo will be out week of 17th also.    Patient has an appt with [facility]

## 2020-02-10 ENCOUNTER — APPOINTMENT (OUTPATIENT)
Dept: GENERAL RADIOLOGY | Facility: HOSPITAL | Age: 80
End: 2020-02-10
Attending: EMERGENCY MEDICINE
Payer: MEDICARE

## 2020-02-10 ENCOUNTER — HOSPITAL ENCOUNTER (OUTPATIENT)
Facility: HOSPITAL | Age: 80
Setting detail: OBSERVATION
Discharge: HOME | End: 2020-02-13
Attending: EMERGENCY MEDICINE | Admitting: INTERNAL MEDICINE
Payer: MEDICARE

## 2020-02-10 DIAGNOSIS — E78.2 MIXED HYPERLIPIDEMIA: ICD-10-CM

## 2020-02-10 DIAGNOSIS — R29.6 FALLS FREQUENTLY: Primary | ICD-10-CM

## 2020-02-10 DIAGNOSIS — R53.1 WEAKNESS GENERALIZED: ICD-10-CM

## 2020-02-10 DIAGNOSIS — N17.9 AKI (ACUTE KIDNEY INJURY) (HCC): ICD-10-CM

## 2020-02-10 LAB
HCT VFR BLD CALC: 39.2 %
HGB BLD-MCNC: 12.5 G/DL
MCH RBC QN AUTO: 27.4 PG
MCHC RBC AUTO-ENTMCNC: 31.9 G/DL
MCV RBC AUTO: 85.8 FL
PLATELET # BLD: 223 10*3/UL
RBC # BLD: 4.57 10*6/UL
WBC # BLD: 9.7 10*3/UL

## 2020-02-10 PROCEDURE — 71045 X-RAY EXAM CHEST 1 VIEW: CPT | Performed by: EMERGENCY MEDICINE

## 2020-02-10 RX ORDER — ACETAMINOPHEN 325 MG/1
650 TABLET ORAL EVERY 4 HOURS PRN
Status: DISCONTINUED | OUTPATIENT
Start: 2020-02-10 | End: 2020-02-13

## 2020-02-10 RX ORDER — ONDANSETRON 2 MG/ML
4 INJECTION INTRAMUSCULAR; INTRAVENOUS EVERY 6 HOURS PRN
Status: DISCONTINUED | OUTPATIENT
Start: 2020-02-10 | End: 2020-02-13

## 2020-02-10 RX ORDER — ATORVASTATIN CALCIUM 40 MG/1
40 TABLET, FILM COATED ORAL NIGHTLY
Status: DISCONTINUED | OUTPATIENT
Start: 2020-02-10 | End: 2020-02-13

## 2020-02-10 RX ORDER — MORPHINE SULFATE 2 MG/ML
1 INJECTION, SOLUTION INTRAMUSCULAR; INTRAVENOUS EVERY 2 HOUR PRN
Status: DISCONTINUED | OUTPATIENT
Start: 2020-02-10 | End: 2020-02-13

## 2020-02-10 RX ORDER — SODIUM CHLORIDE 0.9 % (FLUSH) 0.9 %
3 SYRINGE (ML) INJECTION AS NEEDED
Status: DISCONTINUED | OUTPATIENT
Start: 2020-02-10 | End: 2020-02-13

## 2020-02-10 RX ORDER — ALLOPURINOL 300 MG/1
300 TABLET ORAL DAILY
Status: DISCONTINUED | OUTPATIENT
Start: 2020-02-10 | End: 2020-02-13

## 2020-02-10 RX ORDER — HYDROCODONE BITARTRATE AND ACETAMINOPHEN 5; 325 MG/1; MG/1
2 TABLET ORAL EVERY 4 HOURS PRN
Status: DISCONTINUED | OUTPATIENT
Start: 2020-02-10 | End: 2020-02-13

## 2020-02-10 RX ORDER — MORPHINE SULFATE 2 MG/ML
2 INJECTION, SOLUTION INTRAMUSCULAR; INTRAVENOUS EVERY 2 HOUR PRN
Status: DISCONTINUED | OUTPATIENT
Start: 2020-02-10 | End: 2020-02-13

## 2020-02-10 RX ORDER — MORPHINE SULFATE 4 MG/ML
4 INJECTION, SOLUTION INTRAMUSCULAR; INTRAVENOUS EVERY 2 HOUR PRN
Status: DISCONTINUED | OUTPATIENT
Start: 2020-02-10 | End: 2020-02-13

## 2020-02-10 RX ORDER — SODIUM CHLORIDE 9 MG/ML
INJECTION, SOLUTION INTRAVENOUS CONTINUOUS
Status: DISCONTINUED | OUTPATIENT
Start: 2020-02-10 | End: 2020-02-10

## 2020-02-10 RX ORDER — HYDROCODONE BITARTRATE AND ACETAMINOPHEN 5; 325 MG/1; MG/1
1 TABLET ORAL EVERY 4 HOURS PRN
Status: DISCONTINUED | OUTPATIENT
Start: 2020-02-10 | End: 2020-02-13

## 2020-02-10 RX ORDER — METOCLOPRAMIDE HYDROCHLORIDE 5 MG/ML
5 INJECTION INTRAMUSCULAR; INTRAVENOUS EVERY 8 HOURS PRN
Status: DISCONTINUED | OUTPATIENT
Start: 2020-02-10 | End: 2020-02-12

## 2020-02-10 RX ORDER — ASPIRIN 81 MG/1
81 TABLET ORAL DAILY
Status: DISCONTINUED | OUTPATIENT
Start: 2020-02-10 | End: 2020-02-13

## 2020-02-10 RX ORDER — NALOXONE HYDROCHLORIDE 0.4 MG/ML
0.08 INJECTION, SOLUTION INTRAMUSCULAR; INTRAVENOUS; SUBCUTANEOUS
Status: DISCONTINUED | OUTPATIENT
Start: 2020-02-10 | End: 2020-02-13

## 2020-02-10 RX ORDER — SODIUM CHLORIDE 9 MG/ML
INJECTION, SOLUTION INTRAVENOUS CONTINUOUS
Status: DISCONTINUED | OUTPATIENT
Start: 2020-02-10 | End: 2020-02-12

## 2020-02-10 RX ORDER — BISACODYL 10 MG
10 SUPPOSITORY, RECTAL RECTAL
Status: DISCONTINUED | OUTPATIENT
Start: 2020-02-10 | End: 2020-02-13

## 2020-02-10 RX ORDER — POLYETHYLENE GLYCOL 3350 17 G/17G
17 POWDER, FOR SOLUTION ORAL DAILY PRN
Status: DISCONTINUED | OUTPATIENT
Start: 2020-02-10 | End: 2020-02-13

## 2020-02-10 RX ORDER — METOPROLOL SUCCINATE 50 MG/1
50 TABLET, EXTENDED RELEASE ORAL 2 TIMES DAILY
Status: DISCONTINUED | OUTPATIENT
Start: 2020-02-10 | End: 2020-02-13

## 2020-02-10 NOTE — TELEPHONE ENCOUNTER
Spoke with Dr. Jordan Zaragoza advised patient go to ER,  daughter Cristy Ferrell informed Dr. Jordan Zaragoza advised patient to be taken to ER. Cristy Ferrell stated they will go right now.

## 2020-02-10 NOTE — ED NOTES
Pt reports since discharge from hospital, she has felt her leg \"twitching and giving out\" on her when ambulating. Pt reports 3 falls yesterday. Denies hitting head. States she lands on [employer]

## 2020-02-10 NOTE — ED INITIAL ASSESSMENT (HPI)
Discharged from [facility] from [address]. Pt and family reports falls everyday since then except for today. Pt reports LLE is weak and \"jerks\". Now c/o L hip pain.

## 2020-02-10 NOTE — ED PROVIDER NOTES
Patient Seen in: [facility] Emergency Department      History   Patient presents with:  Fall    Stated Complaint:     HPI    65-year-old female presents the ER for frequent falls.   Patient was recently discharged from [facility] on February appearance. She is normal weight. HENT:      Head: Normocephalic and atraumatic. Nose: Nose normal.   Eyes:      Extraocular Movements: Extraocular movements intact.       Conjunctiva/sclera: Conjunctivae normal.      Pupils: Pupils are equal, round, PEPTIDE - Normal   CBC WITH DIFFERENTIAL WITH PLATELET    Narrative: The following orders were created for panel order CBC WITH DIFFERENTIAL WITH PLATELET.   Procedure                               Abnormality         Status                     --------

## 2020-02-10 NOTE — TELEPHONE ENCOUNTER
Spoke with daughter Jennifer Martinez (GENNA verified)--reports patient has fallen every day since patient was discharged from hospital 2/06/2020. \"She went to bed the night we brought her home from the hospital, but Friday, she fell 3 times.  Every day, she has fa

## 2020-02-11 ENCOUNTER — APPOINTMENT (OUTPATIENT)
Dept: MRI IMAGING | Facility: HOSPITAL | Age: 80
End: 2020-02-11
Attending: Other
Payer: MEDICARE

## 2020-02-11 ENCOUNTER — APPOINTMENT (OUTPATIENT)
Dept: GENERAL RADIOLOGY | Facility: HOSPITAL | Age: 80
End: 2020-02-11
Attending: INTERNAL MEDICINE
Payer: MEDICARE

## 2020-02-11 PROBLEM — I42.0 DILATED CARDIOMYOPATHY (HCC): Status: ACTIVE | Noted: 2020-02-11

## 2020-02-11 PROBLEM — I34.0 NONRHEUMATIC MITRAL VALVE REGURGITATION: Status: ACTIVE | Noted: 2020-02-11

## 2020-02-11 LAB
ALBUMIN SERPL-MCNC: 2.5 G/DL
ALBUMIN/GLOB SERPL: 0.6 {RATIO}
ALP SERPL-CCNC: 145 U/L
ALT SERPL-CCNC: 17 U/L
ANION GAP SERPL CALC-SCNC: 5 MMOL/L
AST SERPL-CCNC: 21 U/L
BILIRUB SERPL-MCNC: 0.6 MG/DL
BUN SERPL-MCNC: 20 MG/DL
BUN/CREAT SERPL: 17.7
CALCIUM SERPL-MCNC: 8.7 MG/DL
CHLORIDE SERPL-SCNC: 105 MMOL/L
CO2 SERPL-SCNC: 27 MMOL/L
CREAT SERPL-MCNC: 1.13 MG/DL
GLOBULIN SER-MCNC: 4.1 G/DL
GLUCOSE SERPL-MCNC: 96 MG/DL
POTASSIUM SERPL-SCNC: 3.9 MMOL/L
PROT SERPL-MCNC: 6.6 G/DL
SODIUM SERPL-SCNC: 137 MMOL/L

## 2020-02-11 PROCEDURE — 73502 X-RAY EXAM HIP UNI 2-3 VIEWS: CPT | Performed by: INTERNAL MEDICINE

## 2020-02-11 PROCEDURE — 70551 MRI BRAIN STEM W/O DYE: CPT | Performed by: OTHER

## 2020-02-11 PROCEDURE — 72100 X-RAY EXAM L-S SPINE 2/3 VWS: CPT | Performed by: INTERNAL MEDICINE

## 2020-02-11 PROCEDURE — 99214 OFFICE O/P EST MOD 30 MIN: CPT | Performed by: OTHER

## 2020-02-11 PROCEDURE — 99220 INITIAL OBSERVATION CARE,LEVL III: CPT | Performed by: INTERNAL MEDICINE

## 2020-02-11 NOTE — PLAN OF CARE
Problem: Patient Centered Care  Goal: Patient preferences are identified and integrated in the patient's plan of care  Description  Interventions:  - What would you like us to know as we care for you?  I was here a week ago  - Provide timely, complete, an Identify cognitive and physical deficits and behaviors that affect risk of falls.   - [place] fall precautions as indicated by assessment.  - Educate pt/family on patient safety including physical limitations  - Instruct pt to call for assistance with act

## 2020-02-11 NOTE — PHYSICAL THERAPY NOTE
PHYSICAL THERAPY EVALUATION - INPATIENT     Room Number: 557/557-A  Evaluation Date: 2/11/2020  Type of Evaluation: Initial   Physician Order: PT Eval and Treat    Presenting Problem: pt with hx of repeated falls in home prior to admission and new onset l detailed HPI , PMH and PLF. Prior to last admission 2/04--2/06/20 ,  pt is indep comm ambulator no AD with no hx of falls prior to 2/04-2/06. Pt reports prior to 2/04 indep ADLS and was able to drive. 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   P Follow up later today as precaution due to pt symptoms above. Also note orthostatic vitals assessed ---see below ---pt with drop in BP noted supine 127/65  And standing BP at 82/61 ( 66)   ---these vitals reported to RN .  Pt was asymptomatic throughout training;Stair training  Rehab Potential : Good  Frequency (Obs): 5x/week       PHYSICAL THERAPY MEDICAL/SOCIAL HISTORY     History related to current admission:    From primary care MD note     Patient presents with:  Fall     Per patient she was fine aft Electrolytes look okay. Neurology consulted. Urine does not show any evidence of infection. Chest x-ray noted.       Essential hypertension  Slightly lower. Positive orthostatics. Looks a little bit on the dry side.   We will do light IV fluids due to    Recommendations:  See below     Thank you for allowing me to participate in the care of your patient.     Ramiro Hatfield  2/11/2020                  Problem List  Principal Problem:    Falls frequently  Active Problems:    Essential hypertension    Lef None                PAIN ASSESSMENT  Ratin  Location: pt rating pain at 8/10 left buttock and hip area   Management Techniques: Activity promotion; Body mechanics; Relaxation;Repositioning    COGNITION  · Overall Cognitive Status:  WFL - within functiona and from a bed to a chair (including a wheelchair)?: A Little   -   Need to walk in hospital room?: A Lot(pain is limiting factor )   -   Climbing 3-5 steps with a railing?: A Lot     AM-PAC Score:  Raw Score: 16   Approx Degree of Impairment: 54.16%   Sta

## 2020-02-11 NOTE — TELEPHONE ENCOUNTER
Paging    Message # [phone]         2020 01:51p   [LACY]  To:  From:  FAISAL Holman MD:  Phone#:  ----------------------------------------------------------------------  Felecia Drake  [phone]  PT ASAD JONES 44 PT IS VERY   WEROVERTO

## 2020-02-11 NOTE — TELEPHONE ENCOUNTER
Per chart review, pt was seen yesterday at [address] [place] [address] and has been admitted. Closing encounter per department process.

## 2020-02-11 NOTE — TELEPHONE ENCOUNTER
I spoke with her daughter and explained to her that if she is feeling weak and falling she needs to take her to the emergency room

## 2020-02-11 NOTE — PAYOR COMM NOTE
--------------  ADMISSION REVIEW     Yanely Mishra MA O  Subscriber #:  [id number]  Authorization Number: [id number]    Admit date: 2/4/20  Admit time: [place]       Admitting Physician: Benedict Wilson MD  Attending Physician:  No att. providers found  Pr left-middle field reveals wheezing. Examination of the right-lower field reveals decreased breath sounds. Examination of the left-lower field reveals decreased breath sounds. Decreased breath sounds and wheezing present.       Labs Reviewed   CBC W/ DIFFERE 11/22/1940 MRN [id number]   Location [facility]5W Attending Treasure Ambrose MD   Hosp Day # 0 PCP Mitchell Logan MD     Date:  2/4/2020  Date of Admission:  2/4/2020    History provided by:patient  HPI:   Patient presents with:  Dyspnea CEDRICK SO Conjunctivae, EOM and lids are normal. Right eye exhibits no chemosis, no discharge, no exudate and no hordeolum. No foreign body present in the right eye. Left eye exhibits no chemosis, no discharge, no exudate and no hordeolum.  No foreign body present in Skin: Skin is warm and dry. No lesion and no rash noted. She is not diaphoretic. No erythema. No pallor. Psychiatric: She has a normal mood and affect.      Lab Results   Component Value Date    WBC 10.4 02/04/2020    HGB 12.2 02/04/2020    HCT 38.1 02/ thyroidectomy. Stable. PT/OT. DVT prophylaxis with SCD's.    Mohan Bradshaw DATE:       2020      OPERATION DATE:  2020:   Progress Note           Sameer Brody Patient Status:  Inpatient    1940 MRN [id number]   L functions        Results:            Lab Results   Component Value Date     WBC 10.5 02/05/2020     HGB 10.9 (L) 02/05/2020     HCT 34.3 (L) 02/05/2020     .0 02/05/2020     CREATSERUM 1.27 (H) 02/05/2020     BUN 23 (H) 02/05/2020      02/05/2 LAD and circumflex. The LAD is free of disease and extends out to the cardiac apex. There is a high takeoff diagonal which is moderate to large in size and also free of disease.   Another small diagonal arises from the midvessel and is also normal.  The c 50 mg Oral Mello CLAIRE Pate      Sacubitril-Valsartan (ENTRESTO) 24-26 MG per tab 1 tablet     Date Action Dose Route User    2/11/2020 0914 Given 1 tablet Oral Michael Loaiza RN      0.9% NaCl infusion     Date Action Dose Route User    2/10/2020 6345

## 2020-02-11 NOTE — PROGRESS NOTES
[facility] Pharmacy Note:  Renal Dose Adjustment for Metoclopramide (REGLAN)    Aj Leslie has been prescribed Metoclopramide (REGLAN) 10 mg every 8 hours as needed for n/v.    Estimated Creatinine Clearance: 24.8 mL/min (A) (based on SCr of 1.64 mg/dL (H)).

## 2020-02-11 NOTE — OCCUPATIONAL THERAPY NOTE
OCCUPATIONAL THERAPY EVALUATION - INPATIENT     Room Number: 557/557-A  Evaluation Date: 2/11/2020  Type of Evaluation: Initial  Presenting Problem: Falls: LLE weakness     Physician Order: IP Consult to Occupational Therapy  Reason for Therapy: ADL/IADL Form.  Research supports that patients with this level of impairment may benefit from home with home health OT. Pt would benefit from a home safety assessment to reduce the risk of falls in the future.      DISCHARGE RECOMMENDATIONS  OT Discharge Recommenda Equipment: Standard height toilet  Shower/Tub and Equipment: Tub-shower combo;Grab bar     Drives: Yes  Patient Regularly Uses: None    Stairs in Home: 25 stairs up to apartment   Use of Assistive Device(s): None     Prior Level of [place]: PTA pt is on and taking off regular upper body clothing?: A Little  -   Taking care of personal grooming such as brushing teeth?: A Little  -   Eating meals?: None    AM-PAC Score:  Score: 19  Approx Degree of Impairment: 42.8%  Standardized Score (AM-PAC Scale): 40

## 2020-02-11 NOTE — PLAN OF CARE
Problem: Patient Centered Care  Goal: Patient preferences are identified and integrated in the patient's plan of care  Description  Interventions:  - What would you like us to know as we care for you?  I was here a week ago  - Provide timely, complete, an Identify cognitive and physical deficits and behaviors that affect risk of falls.   - [place] fall precautions as indicated by assessment.  - Educate pt/family on patient safety including physical limitations  - Instruct pt to call for assistance with act

## 2020-02-11 NOTE — CM/SW NOTE
MDO for dc planning, possible rehab. PT/OT eval pending. 1600  CM spoke with PT. Her rec is HAMIDA. CM met with pt at the bedside. Pt is not agreeable to [facility] or [facility]. She lives alone however her son lives downstairs and grandtr lives on the same block.  B

## 2020-02-11 NOTE — CONSULTS
[facility]    PATIENT'S NAME: Paris Michel   ATTENDING PHYSICIAN: Ana Rico MD   CONSULTING PHYSICIAN: Sebastian Pabon MD   PATIENT ACCOUNT#:   [id number]    LOCATION:  [address] [place] [address] #:   [id number]       DATE OF BIRTH: reflexes are symmetrical.  No Babinski signs. No carotid bruits. Joint position sense and vibration normal.  Finger-to-nose is normal.    LABORATORY DATA:  I reviewed her labs, revealing normal chemistry group, B12. CPK was slightly elevated.   TSH was n

## 2020-02-11 NOTE — CONSULTS
[facility]    Cardiology Consultation  [facility]    Dee Reyes Patient Status:  Observation    1940 MRN [id number]   Location [facility] 5SW/SE Attending Treasure Ambrose MD   Hosp Day # 0 PCP Mag OTHER SURGICAL HISTORY      LASER SURGERY ON GALL BLADDER STONES   • THYROIDECTOMY  8/31/15    Total thyroidectomy.  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    • UPPER GI ENDOSCOPY,EXAM         Family History  See below  Family History   Problem Relat Or  morphINE sulfate (PF) 4 MG/ML injection 4 mg, 4 mg, Intravenous, Q2H PRN  Naloxone HCl (NARCAN) 0.4 MG/ML injection 0.08 mg, 0.08 mg, Intravenous, Q5 Min PRN  PEG 3350 (MIRALAX) powder packet 17 g, 17 g, Oral, Daily PRN  bisacodyl (DULCOLAX) rectal sup noted.    Physical Exam:     Patient Vitals for the past 24 hrs:   BP Temp Temp src Pulse Resp SpO2 Height Weight   02/11/20 0900 123/67 97.5 °F (36.4 °C) Oral — 18 97 % — —   02/11/20 0629 107/60 97.5 °F (36.4 °C) Oral 96 18 94 % — 159 lb 8 oz (72.3 kg) Normal excursions and effort. Abdomen: Soft, non-tender. No organosplenomegally, mass or rebound, BS-present. Extremities: Without clubbing, cyanosis or edema. Peripheral pulses are 2+. Neurologic: Alert and oriented, normal affect.  No focal defects  S 15:07     Approved by (CST): Kyle Longoria MD on 2/10/2020 at 15:08              Impression:     Essential hypertension  Stable, mild orthostatic drop. Do not need to significantly adjust medication at this time.       Left bundle branch block (LBBB) on e

## 2020-02-11 NOTE — H&P
[facility]    History and Physical    Jak Pollen Patient Status:  Observation    1940 MRN [id number]   Location [facility] 5SW/SE Attending Jag Blanc MD   Hosp Day # 0 PCP Betina Wall.  Taurus Preston MD     Date:  2020 tried nothing for the symptoms.        History     Past Medical History:   Diagnosis Date   • Anxiety state, unspecified    • Back problem    • Degeneration of lumbar or lumbosacral intervertebral disc 9/18/2018   • Disorder of thyroid     removed 2012/2013 Succinate ER 50 MG Oral Tablet 24 Hr, Take 1 tablet (50 mg total) by mouth 2 (two) times daily. ATORVASTATIN 40 MG Oral Tab, TAKE 1 TABLET(40 MG) BY MOUTH EVERY NIGHT  Cholecalciferol (D3 SUPER STRENGTH) 2000 units Oral Cap, Take 2,000 Units by mouth.   as 123/67, pulse 96, temperature 97.5 °F (36.4 °C), temperature source Oral, resp. rate 18, height 5' 4\" (1.626 m), weight 159 lb 8 oz (72.3 kg), SpO2 97 %, not currently breastfeeding.   Physical Exam  Cervical Papanicolaou contraindicated    Results:     La orthostatics. Looks a little bit on the dry side. We will do light IV fluids due to history of CHF. Will monitor. Hold diuretics. Left bundle branch block (LBBB) on electrocardiogram  On telemetry. Cardiology following patient.       JACK (acute ki

## 2020-02-11 NOTE — PAYOR COMM NOTE
--------------  DISCHARGE REVIEW    Fracisco Dumont MA O  Subscriber #:  [id number]  Authorization Number: [id number]    Admit date: 2/4/20  Admit time:  0158  Discharge Date: 2/6/2020  6:16 PM     Admitting Physician: Magali Whitaker MD  Attending Phys Moderate to severe pulmonary hypertension. 2.       Severe elevation of left ventricular filling pressures. 3.       Severe global left ventricular hypokinesis. 4.       Normal coronary arteries. Changed our up to [facility] and LifeVest done. CHANGE how you take these medications      Instructions Prescription details   Pantoprazole Sodium 40 MG Tbec  Commonly known as:  PROTONIX  What changed:  Another medication with the same name was removed.  Continue taking this medication, and follow the Where to Get Your Medications      These medications were sent to [place] [address] #[id number] - [facility] [address], [address] [facility] AT [address], [phone], [address] [place] [address]    Phone:  [phone] 2/6/2020 10:22 PM         REVIEWER COMMENTS

## 2020-02-11 NOTE — PLAN OF CARE
Problem: Patient Centered Care  Goal: Patient preferences are identified and integrated in the patient's plan of care  Description  Interventions:  - What would you like us to know as we care for you?  I was here a week ago  - Provide timely, complete, an Identify cognitive and physical deficits and behaviors that affect risk of falls.   - [place] fall precautions as indicated by assessment.  - Educate pt/family on patient safety including physical limitations  - Instruct pt to call for assistance with act

## 2020-02-12 ENCOUNTER — APPOINTMENT (OUTPATIENT)
Dept: ULTRASOUND IMAGING | Facility: HOSPITAL | Age: 80
End: 2020-02-12
Attending: INTERNAL MEDICINE
Payer: MEDICARE

## 2020-02-12 PROCEDURE — 95816 EEG AWAKE AND DROWSY: CPT | Performed by: OTHER

## 2020-02-12 PROCEDURE — 93880 EXTRACRANIAL BILAT STUDY: CPT | Performed by: INTERNAL MEDICINE

## 2020-02-12 PROCEDURE — 99226 SUBSEQUENT OBSERVATION CARE: CPT | Performed by: INTERNAL MEDICINE

## 2020-02-12 PROCEDURE — 99214 OFFICE O/P EST MOD 30 MIN: CPT | Performed by: OTHER

## 2020-02-12 RX ORDER — METOCLOPRAMIDE HYDROCHLORIDE 5 MG/ML
10 INJECTION INTRAMUSCULAR; INTRAVENOUS EVERY 8 HOURS PRN
Status: DISCONTINUED | OUTPATIENT
Start: 2020-02-12 | End: 2020-02-13

## 2020-02-12 NOTE — PHYSICAL THERAPY NOTE
PHYSICAL THERAPY TREATMENT NOTE - INPATIENT     Room Number: 557/557-A       Presenting Problem: pt with hx of repeated falls in home prior to admission and new onset left buttock / hip area into left LE pain .   pt was admitted from 2/04---2/06 with CHF wi Cardiac;Limb alert - left;Bed/chair alarm    WEIGHT BEARING RESTRICTION  Weight Bearing Restriction: None      PAIN ASSESSMENT   Rating: Unable to rate  Location: buttocks  Management Techniques: Activity promotion; Body mechanics; Relaxation;Repositioning modified independent      Goal #1   Current Status  met 2/12/2020     Goal #2 Patient is able to demonstrate transfers EOB to/from Chair/Wheelchair at assistance level: modified independent with LRAD   met 2/12/2020     Goal #2  Current Status     Goal #3

## 2020-02-12 NOTE — PLAN OF CARE
Pt alert x4, denies pain, occasionally pain in lower back, warm pack helps with relief. Pt up to BR with standby assist, pt encouraged to use walker  for safety but refuses to use it.  Pt so far has had no episodes of her left leg \"giving out\" while here Notify MD/LIP if interventions unsuccessful or patient reports new pain  - Anticipate increased pain with activity and pre-medicate as appropriate  Outcome: Progressing     Problem: SAFETY ADULT - FALL  Goal: Free from fall injury  Description  INTERVENTIO

## 2020-02-12 NOTE — PROGRESS NOTES
[facility]    Progress Note    Julia Meek Patient Status:  Observation    1940 MRN [id number]   Location [facility] 5SW/SE Attending Nii Carmona MD   Hosp Day # 0 PCP Miriam Fitzpatrick.  Mary Anderson MD        Subjective:     Con is oriented to person, place, and time. She appears well-developed and well-nourished. She appears distressed. She is not intubated. HENT:   Mouth/Throat: Oropharynx is clear and moist. No oropharyngeal exudate or posterior oropharyngeal erythema.    Neck patient. Moderate to severe. Stable.        History of thyroid cancer. Status post thyroidectomy. On replacement levothyroxine. Stable levels.        History of migraines. Little bit different migraines.   CT from last admission did not show anything Essentially unchanged chest from previous study redemonstrating moderate multilevel spondylosis and mild anterior wedging of vertebral bodies stable. Intervertebral disc space narrowing with bony proliferative change. Mild scoliosis.   No acute appearing

## 2020-02-12 NOTE — PROGRESS NOTES
[facility] HOSP - [facility]    Cardiology Progress Note    Radha Ricci Patient Status:  Observation    1940 MRN [id number]   Location [facility] 5SW/SE Attending Zackary Carrel., MD   Hosp Day # 0 PCP Travis Barreto MD     [facility] toprol, entresto  - HOLD lasix & jesús - will need lasix PRN after DC  - enc hydration - poor PO water intake at home,  Good adherence to low Na diet  - further valve wkup as outpt      JACK on CKD   - Cr. 1.6 on admission now resolved with hydration  - Cr. (cpt=72100)    Result Date: 2/11/2020  CONCLUSION:  1. Essentially unchanged chest from previous study redemonstrating moderate multilevel spondylosis and mild anterior wedging of vertebral bodies stable.   Intervertebral disc space narrowing with bony prol

## 2020-02-12 NOTE — PLAN OF CARE
Problem: Patient Centered Care  Goal: Patient preferences are identified and integrated in the patient's plan of care  Description  Interventions:  - What would you like us to know as we care for you?  I was here a week ago  - Provide timely, complete, an Identify cognitive and physical deficits and behaviors that affect risk of falls.   - [place] fall precautions as indicated by assessment.  - Educate pt/family on patient safety including physical limitations  - Instruct pt to call for assistance with act

## 2020-02-13 VITALS
OXYGEN SATURATION: 96 % | RESPIRATION RATE: 18 BRPM | HEART RATE: 90 BPM | BODY MASS INDEX: 27.57 KG/M2 | WEIGHT: 161.5 LBS | HEIGHT: 64 IN | DIASTOLIC BLOOD PRESSURE: 60 MMHG | TEMPERATURE: 97 F | SYSTOLIC BLOOD PRESSURE: 102 MMHG

## 2020-02-13 PROCEDURE — 99217 OBSERVATION CARE DISCHARGE: CPT | Performed by: INTERNAL MEDICINE

## 2020-02-13 RX ORDER — TRAMADOL HYDROCHLORIDE 50 MG/1
50 TABLET ORAL EVERY 6 HOURS PRN
Qty: 15 TABLET | Refills: 0 | Status: ON HOLD | OUTPATIENT
Start: 2020-02-13 | End: 2020-06-16

## 2020-02-13 RX ORDER — TOPIRAMATE 25 MG/1
25 TABLET ORAL EVERY 12 HOURS SCHEDULED
Status: DISCONTINUED | OUTPATIENT
Start: 2020-02-13 | End: 2020-02-13

## 2020-02-13 RX ORDER — METOPROLOL SUCCINATE 25 MG/1
25 TABLET, EXTENDED RELEASE ORAL 2 TIMES DAILY
Status: DISCONTINUED | OUTPATIENT
Start: 2020-02-13 | End: 2020-02-13

## 2020-02-13 RX ORDER — TOPIRAMATE 25 MG/1
25 TABLET ORAL EVERY 12 HOURS SCHEDULED
Qty: 60 TABLET | Refills: 0 | Status: SHIPPED | OUTPATIENT
Start: 2020-02-13 | End: 2020-02-27

## 2020-02-13 RX ORDER — METOPROLOL SUCCINATE 25 MG/1
25 TABLET, EXTENDED RELEASE ORAL 2 TIMES DAILY
Qty: 60 TABLET | Refills: 2 | Status: SHIPPED | OUTPATIENT
Start: 2020-02-13 | End: 2020-02-27

## 2020-02-13 NOTE — PLAN OF CARE
Problem: Patient Centered Care  Goal: Patient preferences are identified and integrated in the patient's plan of care  Description  Interventions:  - What would you like us to know as we care for you?  I was here a week ago  - Provide timely, complete, an Identify cognitive and physical deficits and behaviors that affect risk of falls.   - [place] fall precautions as indicated by assessment.  - Educate pt/family on patient safety including physical limitations  - Instruct pt to call for assistance with act

## 2020-02-13 NOTE — DISCHARGE SUMMARY
[address] Patient Status:  Observation    1940 MRN [id number]   Location [facility] 5SW/SE Attending Jeannette Jimenez MD   Hosp Day # 0 PCP Lalo Buchanan MD     Date of Admission: 2/10/2020  Date of Discharge: ([facility] [address]  Improved today with light IV hydration.  Avoid nephrotoxins.      Weakness generalized  Social work/PT/OT.  Fall precautions. Cleared by PT. Patient has stairs to get into the back of the house.   Has railing on one side to 1 level and then has ra known as:  ZYLOPRIM      TAKE 1 TABLET(300 MG) BY MOUTH EVERY DAY   Quantity:  90 tablet  Refills:  1     aspirin 81 MG Tabs      Take 81 mg by mouth daily.    Refills:  0     atorvastatin 40 MG Tabs  Commonly known as:  LIPITOR      TAKE 1 TABLET(40 MG) BY BEFORE BREAKFAST    Metoprolol Succinate ER 50 MG Oral Tablet 24 Hr  Take 1 tablet (50 mg total) by mouth 2 (two) times daily.     ATORVASTATIN 40 MG Oral Tab  TAKE 1 TABLET(40 MG) BY MOUTH EVERY NIGHT    Cholecalciferol (D3 SUPER STRENGTH) 2000 units Oral

## 2020-02-13 NOTE — PHYSICAL THERAPY NOTE
PHYSICAL THERAPY TREATMENT NOTE - INPATIENT     Room Number: 557/557-A       Presenting Problem: pt with hx of repeated falls in home prior to admission and new onset left buttock / hip area into left LE pain .   pt was admitted from 2/04---2/06 with CHF wi training    SUBJECTIVE  Pain on  butt    OBJECTIVE  Precautions: Cardiac;Limb alert - left;Bed/chair alarm    WEIGHT BEARING RESTRICTION  Weight Bearing Restriction: None      PAIN ASSESSMENT   Rating: Unable to rate  Location: buttocks  Management [employer] Patient is able to demonstrate supine - sit EOB @ level: modified independent      Goal #1   Current Status  met 2/12/2020     Goal #2 Patient is able to demonstrate transfers EOB to/from Chair/Wheelchair at assistance level: modified independent with LRAD

## 2020-02-13 NOTE — PROCEDURES
[address] [facility], [address]      PATIENT'S NAME: Rigo PRICE   ATTENDING PHYSICIAN: Argentina Reyes MD   PATIENT ACCOUNT #: [de-identified] LOCATION: [address] [place] [address] Dr. Jay Martines [facility] #: [id number] DATE OF BIRTH: [birth year]

## 2020-02-13 NOTE — PLAN OF CARE
Problem: Patient Centered Care  Goal: Patient preferences are identified and integrated in the patient's plan of care  Description  Interventions:  - What would you like us to know as we care for you?  I was here a week ago  - Provide timely, complete, an techniques  - Monitor for opioid side effects  - Notify MD/LIP if interventions unsuccessful or patient reports new pain  - Anticipate increased pain with activity and pre-medicate as appropriate  2/13/2020 1203 by Tonia Douglas RN  Outcome: Adequate system  2/13/2020 1203 by Sandie Saleh RN  Outcome: Adequate for Discharge  2/13/2020 1202 by Sandie Saleh RN  Outcome: Progressing     Pt being dc. Pt seen by physical therapy no need for assistive device.  .pt given topamax and tylenol for h

## 2020-02-13 NOTE — PROGRESS NOTES
[facility]    Progress Note    Crystal Gonzalez Patient Status:  Observation    1940 MRN [id number]   Location [facility] 5SW/SE Attending Shanon Henderson MD   Hosp Day # 0 PCP Quang Hernández.  Bob Haas MD       Subjective:   Will Arteaga tab 125 mcg, 125 mcg, Oral, Before breakfast  Metoprolol Succinate ER (Toprol XL) 24 hr tab 50 mg, 50 mg, Oral, BID  Sacubitril-Valsartan (ENTRESTO) 24-26 MG per tab 1 tablet, 1 tablet, Oral, BID  Normal Saline Flush 0.9 % injection 3 mL, 3 mL, Intravenous 02/12/2020    ALB 2.5 (L) 02/11/2020    ALKPHO 145 (H) 02/11/2020    BILT 0.6 02/11/2020    TP 6.6 02/11/2020    AST 21 02/11/2020    ALT 17 02/11/2020    INR 1.0 08/18/2015    PT 12.8 08/18/2015    T4F 1.2 02/04/2020    TSH 3.320 02/10/2020    ADAM 73 04/1 MD JOON on 2/11/2020 at 13:41          Xr Hip W Or Wo Pelvis 2 Or 3 Views, Left (cpt=73502)    Result Date: 2/11/2020  CONCLUSION:  1. No acute appearing fracture or dislocation. Mild degenerative narrowing of both hip joints.     Dictated by (CST): Genevieve

## 2020-02-14 ENCOUNTER — PATIENT OUTREACH (OUTPATIENT)
Dept: CASE MANAGEMENT | Age: 80
End: 2020-02-14

## 2020-02-14 ENCOUNTER — TELEPHONE (OUTPATIENT)
Dept: INTERNAL MEDICINE CLINIC | Facility: CLINIC | Age: 80
End: 2020-02-14

## 2020-02-14 DIAGNOSIS — R29.6 FALLS FREQUENTLY: ICD-10-CM

## 2020-02-14 DIAGNOSIS — Z02.9 ENCOUNTERS FOR ADMINISTRATIVE PURPOSE: ICD-10-CM

## 2020-02-14 DIAGNOSIS — I42.0 DILATED CARDIOMYOPATHY (HCC): ICD-10-CM

## 2020-02-14 DIAGNOSIS — R53.1 WEAKNESS GENERALIZED: Primary | ICD-10-CM

## 2020-02-14 RX ORDER — TOPIRAMATE 25 MG/1
TABLET ORAL
Qty: 180 TABLET | Refills: 0 | OUTPATIENT
Start: 2020-02-14

## 2020-02-14 NOTE — TELEPHONE ENCOUNTER
Patient dc'd 2/13/2020, frequent falls. She states that she was to have walker ordered upon discharge but never received one. Please advise.

## 2020-02-14 NOTE — PROGRESS NOTES
Initial Post Discharge Follow Up   Discharge Date: 2/13/20  Contact Date: 2/14/2020    Consent Verification:  Assessment Completed With: Patient  HIPAA Verified? Yes    Discharge Dx:   Falls frequently         General:   • How have you been since your [facility] BREAKFAST 90 tablet 1   • PANTOPRAZOLE SODIUM 40 MG Oral Tab EC TAKE 1 TABLET BY MOUTH EVERY MORNING BEFORE BREAKFAST 90 tablet 1   • ATORVASTATIN 40 MG Oral Tab TAKE 1 TABLET(40 MG) BY MOUTH EVERY NIGHT 90 tablet 1   • Cholecalciferol (D3 SUPER STRENGTH) readmission: feeling dizzy and low blood pressure  Did you call your PCP office first? no, s/w nurse who called me  Did you attempt to get in with your PCP? no  Do you feel this could have been prevented?  no               Needs post D/C:   Now that you ar previously        [x]  Discharge Summary, Discharge medicationsdiscussed with patient,  and orders reviewed and discussed. Any changes or updates to medications and or orders sent to PCP.

## 2020-02-19 ENCOUNTER — PATIENT OUTREACH (OUTPATIENT)
Dept: CASE MANAGEMENT | Age: 80
End: 2020-02-19

## 2020-02-20 ENCOUNTER — TELEPHONE (OUTPATIENT)
Dept: INTERNAL MEDICINE CLINIC | Facility: CLINIC | Age: 80
End: 2020-02-20

## 2020-02-20 NOTE — TELEPHONE ENCOUNTER
Contacted the patient, she did not know she had an appointment today and cannot keep the appointment today.  She was advised that [address] [place] [address] will contact her for a follow up appointment    Notified Joshua Sousa, she reviewed the chart and during h

## 2020-02-20 NOTE — TELEPHONE ENCOUNTER
Patient contacted and advised to stay on metoprolol 50mg twice a day, [facility] will contact her to reschedule her appt and she should bring all her medications with her to the first appt, she agreed to plan

## 2020-02-20 NOTE — TELEPHONE ENCOUNTER
Patient is not seen at [address] [facility],  [address] cardiology. I will fax to [facility]. Please follow up with them there.

## 2020-02-20 NOTE — TELEPHONE ENCOUNTER
CARDIOLOGY CLINICAL STAFF  Patient has duplicate prescriptions for metroprolol   Dr Kathe Anders prescription is for metoprolol 25mg BID 2/13/20  Dr Johanna Villeda prescription is for metoprolol 50mg BID 7/18/19 dc 2/13/20 by Rebeca Perry       Pl

## 2020-02-20 NOTE — TELEPHONE ENCOUNTER
Contacted Bren Fulton RN, CHF clinic, patient has appt this afternoon at 2:30pm and the patient should bring all of her medications with her.

## 2020-02-27 ENCOUNTER — TELEPHONE (OUTPATIENT)
Dept: INTERNAL MEDICINE CLINIC | Facility: CLINIC | Age: 80
End: 2020-02-27

## 2020-02-27 ENCOUNTER — OFFICE VISIT (OUTPATIENT)
Dept: CARDIOLOGY CLINIC | Facility: HOSPITAL | Age: 80
End: 2020-02-27
Attending: NURSE PRACTITIONER
Payer: MEDICARE

## 2020-02-27 ENCOUNTER — OFFICE VISIT (OUTPATIENT)
Dept: INTERNAL MEDICINE CLINIC | Facility: CLINIC | Age: 80
End: 2020-02-27
Payer: MEDICARE

## 2020-02-27 VITALS
DIASTOLIC BLOOD PRESSURE: 63 MMHG | SYSTOLIC BLOOD PRESSURE: 128 MMHG | OXYGEN SATURATION: 100 % | WEIGHT: 160 LBS | HEART RATE: 114 BPM | BODY MASS INDEX: 27 KG/M2

## 2020-02-27 VITALS
HEIGHT: 64 IN | RESPIRATION RATE: 18 BRPM | HEART RATE: 132 BPM | BODY MASS INDEX: 27.38 KG/M2 | SYSTOLIC BLOOD PRESSURE: 108 MMHG | WEIGHT: 160.38 LBS | OXYGEN SATURATION: 97 % | DIASTOLIC BLOOD PRESSURE: 72 MMHG | TEMPERATURE: 98 F

## 2020-02-27 DIAGNOSIS — I50.9 HEART FAILURE, UNSPECIFIED (HCC): ICD-10-CM

## 2020-02-27 DIAGNOSIS — I50.21 ACUTE SYSTOLIC CHF (CONGESTIVE HEART FAILURE), NYHA CLASS 1 (HCC): ICD-10-CM

## 2020-02-27 DIAGNOSIS — I10 ESSENTIAL HYPERTENSION: Primary | ICD-10-CM

## 2020-02-27 DIAGNOSIS — K59.01 SLOW TRANSIT CONSTIPATION: ICD-10-CM

## 2020-02-27 DIAGNOSIS — I50.9 ACUTE ON CHRONIC CONGESTIVE HEART FAILURE, UNSPECIFIED HEART FAILURE TYPE (HCC): ICD-10-CM

## 2020-02-27 DIAGNOSIS — R25.2 LEG CRAMPS: ICD-10-CM

## 2020-02-27 DIAGNOSIS — I50.43 ACUTE ON CHRONIC COMBINED SYSTOLIC AND DIASTOLIC CONGESTIVE HEART FAILURE, NYHA CLASS 3 (HCC): Primary | ICD-10-CM

## 2020-02-27 DIAGNOSIS — Z71.85 VACCINE COUNSELING: ICD-10-CM

## 2020-02-27 DIAGNOSIS — I44.7 LEFT BUNDLE BRANCH BLOCK (LBBB) ON ELECTROCARDIOGRAM: ICD-10-CM

## 2020-02-27 DIAGNOSIS — G47.09 OTHER INSOMNIA: ICD-10-CM

## 2020-02-27 LAB
ANION GAP SERPL CALC-SCNC: 7 MMOL/L
ANION GAP SERPL CALC-SCNC: 7 MMOL/L (ref 0–18)
BUN BLD-MCNC: 11 MG/DL (ref 7–18)
BUN SERPL-MCNC: 11 MG/DL
BUN/CREAT SERPL: 9.9
BUN/CREAT SERPL: 9.9 (ref 10–20)
CALCIUM BLD-MCNC: 9.6 MG/DL (ref 8.5–10.1)
CALCIUM SERPL-MCNC: 9.6 MG/DL
CHLORIDE SERPL-SCNC: 108 MMOL/L
CHLORIDE SERPL-SCNC: 108 MMOL/L (ref 98–112)
CO2 SERPL-SCNC: 26 MMOL/L
CO2 SERPL-SCNC: 26 MMOL/L (ref 21–32)
CREAT BLD-MCNC: 1.11 MG/DL (ref 0.55–1.02)
CREAT SERPL-MCNC: 1.11 MG/DL
GLUCOSE BLD-MCNC: 80 MG/DL (ref 70–99)
GLUCOSE SERPL-MCNC: 80 MG/DL
OSMOLALITY SERPL CALC.SUM OF ELEC: 290 MOSM/KG (ref 275–295)
PATIENT FASTING Y/N/NP: NO
POTASSIUM SERPL-SCNC: 3.5 MMOL/L
POTASSIUM SERPL-SCNC: 3.5 MMOL/L (ref 3.5–5.1)
SODIUM SERPL-SCNC: 141 MMOL/L
SODIUM SERPL-SCNC: 141 MMOL/L (ref 136–145)

## 2020-02-27 PROCEDURE — 93005 ELECTROCARDIOGRAM TRACING: CPT

## 2020-02-27 PROCEDURE — 93010 ELECTROCARDIOGRAM REPORT: CPT | Performed by: NURSE PRACTITIONER

## 2020-02-27 PROCEDURE — 99495 TRANSJ CARE MGMT MOD F2F 14D: CPT | Performed by: INTERNAL MEDICINE

## 2020-02-27 PROCEDURE — 80048 BASIC METABOLIC PNL TOTAL CA: CPT | Performed by: NURSE PRACTITIONER

## 2020-02-27 PROCEDURE — 1111F DSCHRG MED/CURRENT MED MERGE: CPT | Performed by: INTERNAL MEDICINE

## 2020-02-27 PROCEDURE — 90662 IIV NO PRSV INCREASED AG IM: CPT | Performed by: INTERNAL MEDICINE

## 2020-02-27 PROCEDURE — G0008 ADMIN INFLUENZA VIRUS VAC: HCPCS | Performed by: INTERNAL MEDICINE

## 2020-02-27 RX ORDER — METOPROLOL SUCCINATE 25 MG/1
TABLET, EXTENDED RELEASE ORAL
Qty: 60 TABLET | Refills: 2 | COMMUNITY
Start: 2020-02-27 | End: 2020-03-03

## 2020-02-27 RX ORDER — POTASSIUM CHLORIDE 20 MEQ/1
TABLET, EXTENDED RELEASE ORAL
Status: DISCONTINUED
Start: 2020-02-27 | End: 2020-02-27

## 2020-02-27 RX ORDER — TOPIRAMATE 25 MG/1
25 TABLET ORAL EVERY 12 HOURS SCHEDULED
Qty: 180 TABLET | Refills: 1 | Status: SHIPPED | OUTPATIENT
Start: 2020-02-27 | End: 2020-12-08 | Stop reason: ALTCHOICE

## 2020-02-27 RX ORDER — ALLOPURINOL 300 MG/1
TABLET ORAL
Qty: 90 TABLET | Refills: 1 | Status: SHIPPED | OUTPATIENT
Start: 2020-02-27 | End: 2020-08-05

## 2020-02-27 RX ORDER — METOPROLOL TARTRATE 50 MG/1
TABLET, FILM COATED ORAL
Status: DISCONTINUED
Start: 2020-02-27 | End: 2020-02-27

## 2020-02-27 NOTE — PATIENT INSTRUCTIONS
kin is warm and dry. She is not diaphoretic. Psychiatric: She has a normal mood and affect. Her behavior is normal.   Nursing note and vitals reviewed. ASSESSMENT/PLAN:   Essential hypertension  (primary encounter diagnosis) Well controlled.   C

## 2020-02-27 NOTE — PATIENT INSTRUCTIONS
Increase your metoprolol succinate to 25 mg in am and 50 mg in pm     Return to [address] [place] [address] on 3/3/20    Follow up with Dr. Rabia Lazo as scheduled       Please call the heart failure clinic if you notice a weight gain of 3 pounds overnight or 5 pounds

## 2020-02-27 NOTE — TELEPHONE ENCOUNTER
Patient states she was seen today by Srinivasan Mills NP and given Metoprolol 25 mg while at visit due to tachycardia.   Patient then informed to increase Metoprolol to 25 mg in am and 50 mg in pm, but patient not wanting to take unless this is ok first with  86

## 2020-02-27 NOTE — PROGRESS NOTES
[address] [place] [address] Patient Status:  Outpatient    1940 MRN [id number]   Location [address] [place], [place] [address]MD Odette Em is a 70year old female who presents to clinic after recent hospita 02/10/2020 02:45 PM    HCT 39.2 02/10/2020 02:45 PM    .0 02/10/2020 02:45 PM    CREATSERUM 1.11 (H) 02/27/2020 12:31 PM    BUN 11 02/27/2020 12:31 PM     02/27/2020 12:31 PM    K 3.5 02/27/2020 12:31 PM     02/27/2020 12:31 PM    CO2 26 Img (cpt=93970)   Result Date: 2/4/2020  CONCLUSION: Normal examination.      Dictated by (CST): William Solis MD on 2/04/2020 at 15:17     Approved by (CST): William Solis MD on 2/04/2020 at 15:18           Xr Chest Ap Portable  (cpt=71045)   Result Date III  GDMT/ Heart Failure Medications:  metoprolol succinate, entresto, asa          Devices: LIFEVEST-has not been wearing as she has not been able to get battery to work  -EF EF 20-25%, no WMA, Grade 3 DD  -Diuresed with IV Lasix during hospital course, w salt/sodium daily ( 1 teaspoon).  Common high sodium foods include frozen dinners, soups (not homemade), some cereal, vegetable juice, canned vegetables, lunch meats, processed meats like hotdogs, sausage, sampson, pepperoni, soy sauce, pre-packaged rice or p

## 2020-02-27 NOTE — PROGRESS NOTES
HPI:    Patient ID: Sonia Arrieta is a 70year old female. Here for F/U from hospital... Falls frequently due to leg suddenly spasming and giving out on her. Usually left leg.   PT/OT/social work.  Check orthostatics.  Normal B12 and TSH.  Electrolyt replacement levothyroxine.  Stable levels.        History of migraines. Little bit different migraines.  CT from last admission did not show anything acute.  Neurology consulted. RI noted.   Discussed with neurology and started on Topamax which will also 02/27/2020 12:31 PM     02/27/2020 12:31 PM    CO2 26.0 02/27/2020 12:31 PM    CREATSERUM 1.11 (H) 02/27/2020 12:31 PM    CA 9.6 02/27/2020 12:31 PM    AST 21 02/11/2020 07:34 AM    ALT 17 02/11/2020 07:34 AM    TSH 3.320 02/10/2020 02:45 PM    T4F 1 nervous/anxious and is not hyperactive. All other systems reviewed and are negative. Current Outpatient Medications   Medication Sig Dispense Refill   • topiramate 25 MG Oral Tab Take 1 tablet (25 mg total) by mouth every 12 (twelve) hours.  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    • UPPER GI ENDOSCOPY,EXAM        Family History   Problem Relation Age of Onset   • Hypertension Mother    • Heart Disease Mother 36        CAD S/P MI.    • Cancer Brother         Lung cancer. Construction.        Social sounds normal. No accessory muscle usage. No apnea, no tachypnea and no bradypnea. She is not intubated. No respiratory distress. She has no decreased breath sounds. She has no wheezes. She has no rhonchi. She has no rales. She exhibits no tenderness.    Ab This Encounter      Fluzone High Dose 65 yr and up [72111]      Meds This Visit:  Requested Prescriptions     Signed Prescriptions Disp Refills   • topiramate 25 MG Oral Tab 180 tablet 1     Sig: Take 1 tablet (25 mg total) by mouth every 12 (twelve) hours BREAKFAST  ATORVASTATIN 40 MG Oral Tab, TAKE 1 TABLET(40 MG) BY MOUTH EVERY NIGHT  Cholecalciferol (D3 SUPER STRENGTH) 2000 units Oral Cap, Take 2,000 Units by mouth. aspirin 81 MG Oral Tab, Take 81 mg by mouth daily.   [DISCONTINUED] topiramate 25 MG Oral diarrhea, blood in stool, abdominal distention, anal bleeding and rectal pain. Endocrine: Negative for cold intolerance, heat intolerance, polydipsia, polyphagia and polyuria.    Genitourinary: Negative for dysuria, urgency, frequency, hematuria, flank pa mass and no thyromegaly present. Cardiovascular: Normal rate, regular rhythm, S1 normal, S2 normal, normal heart sounds, intact distal pulses and normal pulses. Pulses:       Carotid pulses are 2+ on the right side and 2+ on the left side.        Radia & Refills for this Visit:  Requested Prescriptions     Signed Prescriptions Disp Refills   • topiramate 25 MG Oral Tab 180 tablet 1     Sig: Take 1 tablet (25 mg total) by mouth every 12 (twelve) hours.    • allopurinol 300 MG Oral Tab 90 tablet 1     Sig:

## 2020-02-28 ENCOUNTER — TELEPHONE (OUTPATIENT)
Dept: INTERNAL MEDICINE CLINIC | Facility: CLINIC | Age: 80
End: 2020-02-28

## 2020-02-28 ENCOUNTER — NURSE TRIAGE (OUTPATIENT)
Dept: INTERNAL MEDICINE CLINIC | Facility: CLINIC | Age: 80
End: 2020-02-28

## 2020-02-28 DIAGNOSIS — R06.02 SOB (SHORTNESS OF BREATH) ON EXERTION: Primary | ICD-10-CM

## 2020-02-28 RX ORDER — ATORVASTATIN CALCIUM 40 MG/1
TABLET, FILM COATED ORAL
Qty: 90 TABLET | Refills: 1 | Status: SHIPPED | OUTPATIENT
Start: 2020-02-28 | End: 2020-08-05

## 2020-02-28 NOTE — TELEPHONE ENCOUNTER
Spoke to patient and relayed Dr. Wiley Come message from below. She verbalized understanding of message.      Patient states these are her blood pressure readings from yesterday and this mornin20: 138/72  20: 133/78, HR: 107    Patient would lik

## 2020-02-28 NOTE — TELEPHONE ENCOUNTER
Refill passed per [facility] protocol.   Cholesterol Medications  Protocol Criteria:  · Appointment scheduled in the past 12 months or in the next 3 months  · ALT & LDL on file in the past 12 months  · ALT result < 80  · LDL result <130   Recent Outpat

## 2020-02-28 NOTE — TELEPHONE ENCOUNTER
So B/P's are OK. HR slightly higher but just made adjustments yesterday. We will try 1 more day with heart rate if goes above 130 she should go to the ER.

## 2020-02-28 NOTE — TELEPHONE ENCOUNTER
Patient needs new referral to dr Marva Mohs for appt on march 18 for follow up please sign off referral    Thanks    [facility] care

## 2020-02-28 NOTE — TELEPHONE ENCOUNTER
Relayed Dr. Dilan Colindres note below to patient. She verbalized understanding and agreed to plan of care.

## 2020-02-28 NOTE — TELEPHONE ENCOUNTER
Patient seen in office 02/27/2. States she forgot to mention to provider that she has been constipated for a week. States she took two pills of Dulcolax yesterday and still has not had a bowel movement.  Informed her that she needs to be drinking fluids r

## 2020-02-28 NOTE — TELEPHONE ENCOUNTER
Can try a little bit of prune juice with him coughing see will help if not can try MiraLAX 1 teaspoon in small small cup of water less than 8 ounces and try that. If no help then would do Dulcolax suppository x1.   If no help after today and still really u

## 2020-02-28 NOTE — TELEPHONE ENCOUNTER
Attempt made to contact patient; no answer, no option given to leave a message. Patient does not check mychart.

## 2020-02-28 NOTE — TELEPHONE ENCOUNTER
Think it is a good idea due to her fast heart rate. Would do 25 in the morning and 50 in the evening as suggested by specialty clinic. Keep monitors on heart rate and blood pressure.

## 2020-02-28 NOTE — TELEPHONE ENCOUNTER
Pt informed of below but states just had large, formed, soft BM and feels much better. Denies further questions/concerns at this time.

## 2020-03-03 ENCOUNTER — OFFICE VISIT (OUTPATIENT)
Dept: CARDIOLOGY CLINIC | Facility: HOSPITAL | Age: 80
End: 2020-03-03
Attending: NURSE PRACTITIONER
Payer: MEDICARE

## 2020-03-03 VITALS
BODY MASS INDEX: 27 KG/M2 | OXYGEN SATURATION: 98 % | DIASTOLIC BLOOD PRESSURE: 60 MMHG | WEIGHT: 160 LBS | HEART RATE: 105 BPM | SYSTOLIC BLOOD PRESSURE: 112 MMHG

## 2020-03-03 DIAGNOSIS — I50.43 ACUTE ON CHRONIC COMBINED SYSTOLIC AND DIASTOLIC CONGESTIVE HEART FAILURE, NYHA CLASS 3 (HCC): Primary | ICD-10-CM

## 2020-03-03 PROCEDURE — 99212 OFFICE O/P EST SF 10 MIN: CPT | Performed by: NURSE PRACTITIONER

## 2020-03-03 PROCEDURE — 99214 OFFICE O/P EST MOD 30 MIN: CPT | Performed by: NURSE PRACTITIONER

## 2020-03-03 RX ORDER — METOPROLOL SUCCINATE 25 MG/1
50 TABLET, EXTENDED RELEASE ORAL 2 TIMES DAILY
Qty: 60 TABLET | Refills: 2 | COMMUNITY
Start: 2020-03-03 | End: 2020-04-20

## 2020-03-03 RX ORDER — MAGNESIUM OXIDE 400 MG (241.3 MG MAGNESIUM) TABLET
100 TABLET DAILY
COMMUNITY

## 2020-03-03 NOTE — PATIENT INSTRUCTIONS
Increase your metoprolol succinate to 50 mg twice daily.        Return to [address] [place] [address] on 4/14/20    Follow up with Dr. Jean Page 3/18/20      Please call the heart failure clinic if you notice a weight gain of 3 pounds overnight or 5 pounds or more in

## 2020-03-03 NOTE — PROGRESS NOTES
[address] [facility] Patient Status:  Outpatient    1940 MRN [id number]   Location [address] [place] [address] MD Dr. Stanley Clements is a 70year old female who presents to clinic after recent 02/27/2020 12:31 PM    BUN 11 02/27/2020 12:31 PM     02/27/2020 12:31 PM    K 3.5 02/27/2020 12:31 PM     02/27/2020 12:31 PM    CO2 26.0 02/27/2020 12:31 PM    GLU 80 02/27/2020 12:31 PM    CA 9.6 02/27/2020 12:31 PM    ALB 2.5 (L) 02/11/2020 Kelsie Serrato MD on 2/04/2020 at 15:17     Approved by (CST): Kelsie Serrato MD on 2/04/2020 at 15:18           Xr Chest Ap Portable  (cpt=71045)   Result Date: 2/4/2020  CONCLUSION:  1.   There are findings suggesting CHF with pulmonary vascular congestio LIFEVEST-wearing sporatically. Was able to figure out battery issue.     -EF 20-25%, no WMA, Grade 3 DD  -On Entresto 24-26 mg bid,metoprolol succinate 25 mg in am and 50 mg in pm   -Euvolemic  -Recent Renal function stable BUN/Cr 11/1.11, K 3.5  -Daily robin care and education related specifically to heart failure.       Julián Kumar NP  3/3/20

## 2020-03-10 RX ORDER — LEVOTHYROXINE SODIUM 0.12 MG/1
TABLET ORAL
Qty: 90 TABLET | Refills: 1 | Status: SHIPPED | OUTPATIENT
Start: 2020-03-10 | End: 2020-09-13

## 2020-03-10 NOTE — TELEPHONE ENCOUNTER
Refill passed per [facility] protocol.   Hypothyroid Medications  Protocol Criteria:  Appointment scheduled in the past 12 months or the next 3 months  TSH resulted in the past 12 months that is normal  Recent Outpatient Visits            1 week ago Ac

## 2020-03-16 ENCOUNTER — TELEPHONE (OUTPATIENT)
Dept: CARDIOLOGY | Age: 80
End: 2020-03-16

## 2020-03-18 ENCOUNTER — TELEPHONE (OUTPATIENT)
Dept: CARDIOLOGY | Age: 80
End: 2020-03-18

## 2020-03-18 ENCOUNTER — APPOINTMENT (OUTPATIENT)
Dept: CARDIOLOGY | Age: 80
End: 2020-03-18

## 2020-03-23 ENCOUNTER — TELEPHONE (OUTPATIENT)
Dept: CARDIOLOGY | Age: 80
End: 2020-03-23

## 2020-03-23 PROBLEM — I50.9 CHF (CONGESTIVE HEART FAILURE) (CMD): Status: ACTIVE | Noted: 2020-03-23

## 2020-03-24 ENCOUNTER — TELEPHONE (OUTPATIENT)
Dept: CARDIOLOGY | Age: 80
End: 2020-03-24

## 2020-04-14 ENCOUNTER — VIRTUAL PHONE E/M (OUTPATIENT)
Dept: CARDIOLOGY CLINIC | Facility: HOSPITAL | Age: 80
End: 2020-04-14
Attending: NURSE PRACTITIONER
Payer: MEDICARE

## 2020-04-14 DIAGNOSIS — I50.43 ACUTE ON CHRONIC COMBINED SYSTOLIC AND DIASTOLIC CONGESTIVE HEART FAILURE, NYHA CLASS 3 (HCC): Primary | ICD-10-CM

## 2020-04-14 PROCEDURE — 99443 PR TELEPHONE EVAL AND MGNT EST PATIENT 21-30 MIN MEDICAL DISCUSSION: CPT | Performed by: NURSE PRACTITIONER

## 2020-04-14 NOTE — PROGRESS NOTES
Virtual Telephone Check-In    Dyllan Ashford verbally consents to a Virtual/Telephone Check-In visit on 04/14/20. Patient understands and accepts financial responsibility for any deductible, co-insurance and/or co-pays associated with this service.     Dura echo   -consider increasing entresto to 49-51 mg bid   -Follow up in [address] [place] [address] as needed or directed  -Follow up with cardiologist , Dr. Bishop Bashir 5/23/20    Maye López NP  4/14/20

## 2020-04-20 ENCOUNTER — TELEPHONE (OUTPATIENT)
Dept: INTERNAL MEDICINE CLINIC | Facility: CLINIC | Age: 80
End: 2020-04-20

## 2020-04-20 RX ORDER — METOPROLOL SUCCINATE 25 MG/1
50 TABLET, EXTENDED RELEASE ORAL 2 TIMES DAILY
Qty: 180 TABLET | Refills: 0 | Status: ON HOLD | OUTPATIENT
Start: 2020-04-20 | End: 2020-06-16

## 2020-04-20 RX ORDER — METOPROLOL SUCCINATE 50 MG/1
TABLET, EXTENDED RELEASE ORAL
Qty: 180 TABLET | Refills: 1 | Status: SHIPPED | OUTPATIENT
Start: 2020-04-20 | End: 2020-10-17

## 2020-04-20 NOTE — TELEPHONE ENCOUNTER
Patient requesting refill of her Metoprolol, reports was on 25mg tabs but NP with specialty clinic increased dose to 50mg twice daily.  Noted a script was completed on 3/3 for 3 month supply by NP Willy Rios but order was entered as historical, confirmed wi

## 2020-04-22 ENCOUNTER — TELEPHONE (OUTPATIENT)
Dept: CARDIOLOGY | Age: 80
End: 2020-04-22

## 2020-05-14 RX ORDER — ATORVASTATIN CALCIUM 40 MG/1
40 TABLET, FILM COATED ORAL NIGHTLY
COMMUNITY
Start: 2020-02-28

## 2020-05-14 RX ORDER — ATENOLOL 50 MG/1
50 TABLET ORAL DAILY
COMMUNITY
Start: 2014-10-30 | End: 2020-05-18

## 2020-05-14 RX ORDER — FUROSEMIDE 20 MG/1
20 TABLET ORAL DAILY
COMMUNITY
Start: 2020-02-06 | End: 2020-05-18

## 2020-05-14 RX ORDER — PANTOPRAZOLE SODIUM 40 MG/1
40 TABLET, DELAYED RELEASE ORAL DAILY
COMMUNITY
Start: 2019-07-29

## 2020-05-14 RX ORDER — SPIRONOLACTONE 25 MG/1
25 TABLET ORAL DAILY
COMMUNITY
Start: 2020-02-06 | End: 2020-05-18

## 2020-05-14 RX ORDER — TRAMADOL HYDROCHLORIDE 50 MG/1
50 TABLET ORAL PRN
COMMUNITY
Start: 2020-02-13 | End: 2020-05-18

## 2020-05-14 RX ORDER — METOPROLOL SUCCINATE 50 MG/1
75 TABLET, EXTENDED RELEASE ORAL 2 TIMES DAILY
COMMUNITY
Start: 2020-04-20

## 2020-05-14 RX ORDER — LEVOTHYROXINE SODIUM 0.12 MG/1
125 TABLET ORAL DAILY
COMMUNITY
Start: 2016-01-28

## 2020-05-14 RX ORDER — BENAZEPRIL HYDROCHLORIDE 40 MG/1
40 TABLET, FILM COATED ORAL DAILY
COMMUNITY
Start: 2015-05-22 | End: 2020-05-18 | Stop reason: ALTCHOICE

## 2020-05-14 RX ORDER — CHOLECALCIFEROL (VITAMIN D3) 50 MCG
2000 TABLET ORAL DAILY
COMMUNITY
Start: 2010-05-27

## 2020-05-14 RX ORDER — AMLODIPINE BESYLATE 10 MG/1
10 TABLET ORAL DAILY
COMMUNITY
Start: 2014-10-30 | End: 2020-05-18

## 2020-05-14 RX ORDER — VENLAFAXINE 37.5 MG/1
75 TABLET ORAL AT BEDTIME
COMMUNITY
Start: 2016-01-28 | End: 2020-05-18

## 2020-05-14 RX ORDER — TOPIRAMATE 25 MG/1
25 TABLET ORAL 2 TIMES DAILY
COMMUNITY
Start: 2020-02-27 | End: 2020-05-18

## 2020-05-14 RX ORDER — HYDROCHLOROTHIAZIDE 12.5 MG/1
12.5 CAPSULE, GELATIN COATED ORAL DAILY
COMMUNITY
Start: 2016-01-04 | End: 2020-05-18

## 2020-05-14 RX ORDER — CLONAZEPAM 0.5 MG/1
0.5 TABLET ORAL PRN
COMMUNITY
Start: 2015-05-20 | End: 2020-05-18

## 2020-05-14 RX ORDER — ALLOPURINOL 300 MG/1
300 TABLET ORAL DAILY
COMMUNITY
Start: 2020-02-27

## 2020-05-14 RX ORDER — UREA 10 %
100 LOTION (ML) TOPICAL DAILY
COMMUNITY
End: 2021-01-26

## 2020-05-14 RX ORDER — SIMVASTATIN 20 MG
20 TABLET ORAL AT BEDTIME
COMMUNITY
Start: 2015-05-22 | End: 2021-01-26

## 2020-05-14 RX ORDER — ZOLPIDEM TARTRATE 5 MG/1
5 TABLET ORAL NIGHTLY
COMMUNITY
Start: 2014-10-30 | End: 2020-05-18

## 2020-05-18 ENCOUNTER — OFFICE VISIT (OUTPATIENT)
Dept: CARDIOLOGY | Age: 80
End: 2020-05-18

## 2020-05-18 VITALS
DIASTOLIC BLOOD PRESSURE: 78 MMHG | BODY MASS INDEX: 25.95 KG/M2 | SYSTOLIC BLOOD PRESSURE: 128 MMHG | WEIGHT: 152 LBS | HEART RATE: 92 BPM | HEIGHT: 64 IN

## 2020-05-18 DIAGNOSIS — I50.20 SYSTOLIC CONGESTIVE HEART FAILURE, UNSPECIFIED HF CHRONICITY (CMD): Primary | ICD-10-CM

## 2020-05-18 PROCEDURE — 99441 TELEPHONE E&M BY PHYSICIAN EST PT NOT ORIG PREV 7 DAYS 5-10 MIN: CPT | Performed by: INTERNAL MEDICINE

## 2020-05-18 RX ORDER — METOPROLOL SUCCINATE 25 MG/1
25 TABLET, EXTENDED RELEASE ORAL EVERY EVENING
COMMUNITY
End: 2020-05-29

## 2020-05-18 SDOH — HEALTH STABILITY: MENTAL HEALTH: HOW OFTEN DO YOU HAVE A DRINK CONTAINING ALCOHOL?: MONTHLY OR LESS

## 2020-05-18 ASSESSMENT — PATIENT HEALTH QUESTIONNAIRE - PHQ9
1. LITTLE INTEREST OR PLEASURE IN DOING THINGS: NOT AT ALL
SUM OF ALL RESPONSES TO PHQ9 QUESTIONS 1 AND 2: 0
SUM OF ALL RESPONSES TO PHQ9 QUESTIONS 1 AND 2: 0
2. FEELING DOWN, DEPRESSED OR HOPELESS: NOT AT ALL

## 2020-05-19 ENCOUNTER — TELEPHONE (OUTPATIENT)
Dept: CARDIOLOGY | Age: 80
End: 2020-05-19

## 2020-05-20 ENCOUNTER — TELEPHONE (OUTPATIENT)
Dept: CARDIOLOGY | Age: 80
End: 2020-05-20

## 2020-05-22 ENCOUNTER — HOSPITAL ENCOUNTER (OUTPATIENT)
Dept: CV DIAGNOSTICS | Facility: HOSPITAL | Age: 80
Discharge: HOME | End: 2020-05-22
Attending: INTERNAL MEDICINE
Payer: MEDICARE

## 2020-05-22 DIAGNOSIS — I42.0 DILATED CARDIOMYOPATHY (HCC): ICD-10-CM

## 2020-05-22 PROCEDURE — 93306 TTE W/DOPPLER COMPLETE: CPT | Performed by: INTERNAL MEDICINE

## 2020-05-26 ENCOUNTER — TELEPHONE (OUTPATIENT)
Dept: CARDIOLOGY | Age: 80
End: 2020-05-26

## 2020-05-29 ENCOUNTER — TELEPHONE (OUTPATIENT)
Dept: CARDIOLOGY | Age: 80
End: 2020-05-29

## 2020-05-29 ENCOUNTER — OFFICE VISIT (OUTPATIENT)
Dept: CARDIOLOGY | Age: 80
End: 2020-05-29

## 2020-05-29 VITALS
DIASTOLIC BLOOD PRESSURE: 70 MMHG | SYSTOLIC BLOOD PRESSURE: 140 MMHG | OXYGEN SATURATION: 98 % | HEIGHT: 64 IN | HEART RATE: 96 BPM | BODY MASS INDEX: 29.71 KG/M2 | WEIGHT: 174 LBS

## 2020-05-29 DIAGNOSIS — I10 BENIGN ESSENTIAL HTN: ICD-10-CM

## 2020-05-29 DIAGNOSIS — E78.00 PURE HYPERCHOLESTEROLEMIA: ICD-10-CM

## 2020-05-29 DIAGNOSIS — I50.22 CHRONIC SYSTOLIC CONGESTIVE HEART FAILURE (CMD): Primary | ICD-10-CM

## 2020-05-29 PROBLEM — I44.7 LBBB (LEFT BUNDLE BRANCH BLOCK): Status: ACTIVE | Noted: 2020-05-29

## 2020-05-29 PROCEDURE — 99215 OFFICE O/P EST HI 40 MIN: CPT | Performed by: INTERNAL MEDICINE

## 2020-05-29 PROCEDURE — 3078F DIAST BP <80 MM HG: CPT | Performed by: INTERNAL MEDICINE

## 2020-05-29 PROCEDURE — 3077F SYST BP >= 140 MM HG: CPT | Performed by: INTERNAL MEDICINE

## 2020-05-29 SDOH — HEALTH STABILITY: MENTAL HEALTH: HOW OFTEN DO YOU HAVE A DRINK CONTAINING ALCOHOL?: MONTHLY OR LESS

## 2020-05-29 ASSESSMENT — PATIENT HEALTH QUESTIONNAIRE - PHQ9
CLINICAL INTERPRETATION OF PHQ9 SCORE: NO FURTHER SCREENING NEEDED
1. LITTLE INTEREST OR PLEASURE IN DOING THINGS: NOT AT ALL
CLINICAL INTERPRETATION OF PHQ2 SCORE: NO FURTHER SCREENING NEEDED
SUM OF ALL RESPONSES TO PHQ9 QUESTIONS 1 AND 2: 0
2. FEELING DOWN, DEPRESSED OR HOPELESS: NOT AT ALL
SUM OF ALL RESPONSES TO PHQ9 QUESTIONS 1 AND 2: 0

## 2020-06-06 RX ORDER — PANTOPRAZOLE SODIUM 40 MG/1
TABLET, DELAYED RELEASE ORAL
Qty: 90 TABLET | Refills: 1 | Status: SHIPPED | OUTPATIENT
Start: 2020-06-06 | End: 2020-12-31

## 2020-06-08 ENCOUNTER — TELEPHONE (OUTPATIENT)
Dept: CARDIOLOGY | Age: 80
End: 2020-06-08

## 2020-06-08 DIAGNOSIS — I50.22 CHRONIC SYSTOLIC CONGESTIVE HEART FAILURE (CMD): Primary | ICD-10-CM

## 2020-06-11 ENCOUNTER — LAB ENCOUNTER (OUTPATIENT)
Dept: LAB | Facility: HOSPITAL | Age: 80
End: 2020-06-11
Attending: INTERNAL MEDICINE
Payer: MEDICARE

## 2020-06-11 ENCOUNTER — TELEPHONE (OUTPATIENT)
Dept: CARDIOLOGY | Age: 80
End: 2020-06-11

## 2020-06-11 ENCOUNTER — HOSPITAL ENCOUNTER (OUTPATIENT)
Dept: GENERAL RADIOLOGY | Facility: HOSPITAL | Age: 80
Discharge: HOME | End: 2020-06-11
Attending: INTERNAL MEDICINE
Payer: MEDICARE

## 2020-06-11 DIAGNOSIS — I50.22 CHRONIC SYSTOLIC HEART FAILURE (HCC): Primary | ICD-10-CM

## 2020-06-11 DIAGNOSIS — I50.22 CHRONIC SYSTOLIC CONGESTIVE HEART FAILURE (HCC): ICD-10-CM

## 2020-06-11 LAB
BUN SERPL-MCNC: 15 MG/DL
CALCIUM SERPL-MCNC: 9.7 MG/DL
CHLORIDE SERPL-SCNC: 107 MMOL/L
CREAT SERPL-MCNC: 1.08 MG/DL
GLUCOSE SERPL-MCNC: 93 MG/DL
HCT VFR BLD CALC: 40.5 %
HGB BLD-MCNC: 12.7 G/DL
PLATELET # BLD: 199 10*3/UL
POTASSIUM SERPL-SCNC: 3.6 MMOL/L
RBC # BLD: 4.46 10*6/UL
SODIUM SERPL-SCNC: 142 MMOL/L
WBC # BLD: 6.4 10*3/UL

## 2020-06-11 PROCEDURE — 85025 COMPLETE CBC W/AUTO DIFF WBC: CPT

## 2020-06-11 PROCEDURE — 36415 COLL VENOUS BLD VENIPUNCTURE: CPT

## 2020-06-11 PROCEDURE — 87641 MR-STAPH DNA AMP PROBE: CPT

## 2020-06-11 PROCEDURE — 71046 X-RAY EXAM CHEST 2 VIEWS: CPT | Performed by: INTERNAL MEDICINE

## 2020-06-11 PROCEDURE — 80048 BASIC METABOLIC PNL TOTAL CA: CPT

## 2020-06-12 ENCOUNTER — CLINICAL ABSTRACT (OUTPATIENT)
Dept: CARDIOLOGY | Age: 80
End: 2020-06-12

## 2020-06-15 ENCOUNTER — HOSPITAL ENCOUNTER (OUTPATIENT)
Dept: INTERVENTIONAL RADIOLOGY/VASCULAR | Facility: HOSPITAL | Age: 80
Discharge: HOME | End: 2020-06-16
Attending: INTERNAL MEDICINE | Admitting: INTERNAL MEDICINE
Payer: MEDICARE

## 2020-06-15 ENCOUNTER — APPOINTMENT (OUTPATIENT)
Dept: GENERAL RADIOLOGY | Facility: HOSPITAL | Age: 80
End: 2020-06-15
Attending: INTERNAL MEDICINE
Payer: MEDICARE

## 2020-06-15 DIAGNOSIS — I50.9 CHF (CONGESTIVE HEART FAILURE) (HCC): ICD-10-CM

## 2020-06-15 DIAGNOSIS — Z01.818 PRE-OP TESTING: Primary | ICD-10-CM

## 2020-06-15 PROCEDURE — 93005 ELECTROCARDIOGRAM TRACING: CPT

## 2020-06-15 PROCEDURE — 33225 L VENTRIC PACING LEAD ADD-ON: CPT

## 2020-06-15 PROCEDURE — 99153 MOD SED SAME PHYS/QHP EA: CPT

## 2020-06-15 PROCEDURE — 33225 L VENTRIC PACING LEAD ADD-ON: CPT | Performed by: INTERNAL MEDICINE

## 2020-06-15 PROCEDURE — 36415 COLL VENOUS BLD VENIPUNCTURE: CPT

## 2020-06-15 PROCEDURE — 33249 INSJ/RPLCMT DEFIB W/LEAD(S): CPT

## 2020-06-15 PROCEDURE — 93641 EP EVL 1/2CHMB PAC CVDFB TST: CPT | Performed by: INTERNAL MEDICINE

## 2020-06-15 PROCEDURE — 71045 X-RAY EXAM CHEST 1 VIEW: CPT | Performed by: INTERNAL MEDICINE

## 2020-06-15 PROCEDURE — 93641 EP EVL 1/2CHMB PAC CVDFB TST: CPT

## 2020-06-15 PROCEDURE — 0JH609Z INSERTION OF CARDIAC RESYNCHRONIZATION DEFIBRILLATOR PULSE GENERATOR INTO CHEST SUBCUTANEOUS TISSUE AND FASCIA, OPEN APPROACH: ICD-10-PCS | Performed by: INTERNAL MEDICINE

## 2020-06-15 PROCEDURE — 99152 MOD SED SAME PHYS/QHP 5/>YRS: CPT

## 2020-06-15 PROCEDURE — 02HK3KZ INSERTION OF DEFIBRILLATOR LEAD INTO RIGHT VENTRICLE, PERCUTANEOUS APPROACH: ICD-10-PCS | Performed by: INTERNAL MEDICINE

## 2020-06-15 PROCEDURE — 02H43KZ INSERTION OF DEFIBRILLATOR LEAD INTO CORONARY VEIN, PERCUTANEOUS APPROACH: ICD-10-PCS | Performed by: INTERNAL MEDICINE

## 2020-06-15 PROCEDURE — 4B02XTZ MEASUREMENT OF CARDIAC DEFIBRILLATOR, EXTERNAL APPROACH: ICD-10-PCS | Performed by: INTERNAL MEDICINE

## 2020-06-15 PROCEDURE — 33249 INSJ/RPLCMT DEFIB W/LEAD(S): CPT | Performed by: INTERNAL MEDICINE

## 2020-06-15 PROCEDURE — 93010 ELECTROCARDIOGRAM REPORT: CPT | Performed by: INTERNAL MEDICINE

## 2020-06-15 PROCEDURE — 02H63KZ INSERTION OF DEFIBRILLATOR LEAD INTO RIGHT ATRIUM, PERCUTANEOUS APPROACH: ICD-10-PCS | Performed by: INTERNAL MEDICINE

## 2020-06-15 RX ORDER — SODIUM CHLORIDE 9 MG/ML
INJECTION, SOLUTION INTRAVENOUS
Status: ACTIVE | OUTPATIENT
Start: 2020-06-15 | End: 2020-06-15

## 2020-06-15 RX ORDER — BACITRACIN 50000 [USP'U]/1
INJECTION, POWDER, LYOPHILIZED, FOR SOLUTION INTRAMUSCULAR
Status: COMPLETED
Start: 2020-06-15 | End: 2020-06-15

## 2020-06-15 RX ORDER — TOPIRAMATE 25 MG/1
25 TABLET ORAL EVERY 12 HOURS SCHEDULED
Status: DISCONTINUED | OUTPATIENT
Start: 2020-06-15 | End: 2020-06-16

## 2020-06-15 RX ORDER — ACETAMINOPHEN AND CODEINE PHOSPHATE 300; 30 MG/1; MG/1
1 TABLET ORAL EVERY 4 HOURS PRN
Status: DISCONTINUED | OUTPATIENT
Start: 2020-06-15 | End: 2020-06-16

## 2020-06-15 RX ORDER — ACETAMINOPHEN 325 MG/1
650 TABLET ORAL EVERY 4 HOURS PRN
Status: DISCONTINUED | OUTPATIENT
Start: 2020-06-15 | End: 2020-06-16

## 2020-06-15 RX ORDER — ONDANSETRON 2 MG/ML
4 INJECTION INTRAMUSCULAR; INTRAVENOUS EVERY 6 HOURS PRN
Status: DISCONTINUED | OUTPATIENT
Start: 2020-06-15 | End: 2020-06-16

## 2020-06-15 RX ORDER — METOPROLOL SUCCINATE 50 MG/1
50 TABLET, EXTENDED RELEASE ORAL 2 TIMES DAILY
Status: DISCONTINUED | OUTPATIENT
Start: 2020-06-15 | End: 2020-06-16

## 2020-06-15 RX ORDER — MIDAZOLAM HYDROCHLORIDE 1 MG/ML
INJECTION INTRAMUSCULAR; INTRAVENOUS
Status: COMPLETED
Start: 2020-06-15 | End: 2020-06-15

## 2020-06-15 RX ORDER — ACETAMINOPHEN AND CODEINE PHOSPHATE 300; 30 MG/1; MG/1
2 TABLET ORAL EVERY 4 HOURS PRN
Status: DISCONTINUED | OUTPATIENT
Start: 2020-06-15 | End: 2020-06-16

## 2020-06-15 RX ORDER — PANTOPRAZOLE SODIUM 40 MG/1
40 TABLET, DELAYED RELEASE ORAL
Status: DISCONTINUED | OUTPATIENT
Start: 2020-06-16 | End: 2020-06-16

## 2020-06-15 RX ORDER — ACETAMINOPHEN AND CODEINE PHOSPHATE 300; 30 MG/1; MG/1
TABLET ORAL
Status: DISPENSED
Start: 2020-06-15 | End: 2020-06-16

## 2020-06-15 RX ORDER — CEFAZOLIN SODIUM/WATER 2 G/20 ML
2 SYRINGE (ML) INTRAVENOUS EVERY 8 HOURS
Status: COMPLETED | OUTPATIENT
Start: 2020-06-15 | End: 2020-06-16

## 2020-06-15 RX ORDER — CEFAZOLIN SODIUM/WATER 2 G/20 ML
SYRINGE (ML) INTRAVENOUS
Status: COMPLETED
Start: 2020-06-15 | End: 2020-06-15

## 2020-06-15 RX ORDER — ASPIRIN 81 MG/1
81 TABLET, CHEWABLE ORAL DAILY
Status: DISCONTINUED | OUTPATIENT
Start: 2020-06-15 | End: 2020-06-16

## 2020-06-15 RX ORDER — LIDOCAINE HYDROCHLORIDE AND EPINEPHRINE 10; 10 MG/ML; UG/ML
INJECTION, SOLUTION INFILTRATION; PERINEURAL
Status: COMPLETED
Start: 2020-06-15 | End: 2020-06-15

## 2020-06-15 RX ORDER — LEVOTHYROXINE SODIUM 0.05 MG/1
125 TABLET ORAL
Status: DISCONTINUED | OUTPATIENT
Start: 2020-06-16 | End: 2020-06-16

## 2020-06-15 RX ORDER — ATORVASTATIN CALCIUM 40 MG/1
40 TABLET, FILM COATED ORAL NIGHTLY
Status: DISCONTINUED | OUTPATIENT
Start: 2020-06-15 | End: 2020-06-16

## 2020-06-15 RX ADMIN — ACETAMINOPHEN AND CODEINE PHOSPHATE 1 TABLET: 300; 30 TABLET ORAL at 15:36:00

## 2020-06-15 RX ADMIN — ATORVASTATIN CALCIUM 40 MG: 40 TABLET, FILM COATED ORAL at 20:37:00

## 2020-06-15 RX ADMIN — METOPROLOL SUCCINATE 50 MG: 50 TABLET, EXTENDED RELEASE ORAL at 20:38:00

## 2020-06-15 RX ADMIN — TOPIRAMATE 25 MG: 25 TABLET ORAL at 20:37:00

## 2020-06-15 RX ADMIN — ACETAMINOPHEN AND CODEINE PHOSPHATE 1 TABLET: 300; 30 TABLET ORAL at 15:42:00

## 2020-06-15 RX ADMIN — CEFAZOLIN SODIUM/WATER 2 G: 2 G/20 ML SYRINGE (ML) INTRAVENOUS at 18:50:00

## 2020-06-15 RX ADMIN — ACETAMINOPHEN AND CODEINE PHOSPHATE 2 TABLET: 300; 30 TABLET ORAL at 20:30:00

## 2020-06-15 NOTE — INTERVAL H&P NOTE
Pre-op Diagnosis: * No pre-op diagnosis entered *    The above referenced H&P was reviewed by Puneet Kumar MD on 6/15/2020, the patient was examined and no significant changes have occurred in the patient's condition since the H&P was performed.   I discusse

## 2020-06-15 NOTE — PROCEDURES
OPERATION(S) PERFORMED:   1. Biv ICD implant    2. Chest fluoroscopy. 3. DFT testing     : Kunal Castillo MD    INDICATION: CHF, NYHA III, EF <30%, LBBB qrs >140msec.    NYHA funcational class: 3  ICD indication: Primary  Pacing indicaiton: SSS  ICD The only other branch was an anterior branch. The LV  lead was advanced over a guide wire to the distal most position of this branch that with wire went into. The . The guide catheter was then slit.  Testing was normal.    The pocket was irrigated with anti

## 2020-06-15 NOTE — PROGRESS NOTES
Tari Brochure  [id number]  6/15/2020    Post procedure/ recovery hand-off report given to [facility]. Patient's vital signs stable, access site dry and intact, no signs and symptoms of bleeding/ hematoma.     Marta Park RN

## 2020-06-16 ENCOUNTER — APPOINTMENT (OUTPATIENT)
Dept: GENERAL RADIOLOGY | Facility: HOSPITAL | Age: 80
End: 2020-06-16
Attending: INTERNAL MEDICINE
Payer: MEDICARE

## 2020-06-16 VITALS
SYSTOLIC BLOOD PRESSURE: 136 MMHG | OXYGEN SATURATION: 98 % | TEMPERATURE: 99 F | WEIGHT: 165.88 LBS | RESPIRATION RATE: 18 BRPM | HEART RATE: 82 BPM | HEIGHT: 64 IN | BODY MASS INDEX: 28.32 KG/M2 | DIASTOLIC BLOOD PRESSURE: 83 MMHG

## 2020-06-16 PROCEDURE — 71046 X-RAY EXAM CHEST 2 VIEWS: CPT | Performed by: INTERNAL MEDICINE

## 2020-06-16 PROCEDURE — 99214 OFFICE O/P EST MOD 30 MIN: CPT | Performed by: NURSE PRACTITIONER

## 2020-06-16 RX ORDER — ACETAMINOPHEN AND CODEINE PHOSPHATE 300; 30 MG/1; MG/1
1 TABLET ORAL EVERY 4 HOURS PRN
Qty: 30 TABLET | Refills: 0 | Status: ON HOLD | OUTPATIENT
Start: 2020-06-16 | End: 2020-12-03

## 2020-06-16 RX ADMIN — TOPIRAMATE 25 MG: 25 TABLET ORAL at 09:05:00

## 2020-06-16 RX ADMIN — CEFAZOLIN SODIUM/WATER 2 G: 2 G/20 ML SYRINGE (ML) INTRAVENOUS at 03:33:00

## 2020-06-16 RX ADMIN — ACETAMINOPHEN AND CODEINE PHOSPHATE 2 TABLET: 300; 30 TABLET ORAL at 09:08:00

## 2020-06-16 RX ADMIN — METOPROLOL SUCCINATE 50 MG: 50 TABLET, EXTENDED RELEASE ORAL at 09:05:00

## 2020-06-16 RX ADMIN — ACETAMINOPHEN AND CODEINE PHOSPHATE 2 TABLET: 300; 30 TABLET ORAL at 03:32:00

## 2020-06-16 RX ADMIN — ASPIRIN 81 MG: 81 TABLET, CHEWABLE ORAL at 09:05:00

## 2020-06-16 NOTE — PROGRESS NOTES
[facility] - [facility]    Progress Note    Julee Abad Patient Status:  Observation    1940 MRN [id number]   Location [facility] 3W/SW Attending Rosy Portillo MD   Hosp Day # 0 PCP Yayo Kim.  Irene Chamberlain MD        Subjective:     Respirator 6/15/2020  CONCLUSION:  1. Atrial biventricular defibrillator. No evidence of immediate complication. 2. Mild stable cardiomegaly. 3. Minimal linear atelectasis or scar in lung bases.     Dictated by (CST): Jerilyn Sewell MD on 6/15/2020 at 2:14 PM     Fin

## 2020-06-17 DIAGNOSIS — I50.22 CHRONIC SYSTOLIC CONGESTIVE HEART FAILURE (CMD): ICD-10-CM

## 2020-06-17 PROCEDURE — X1094 XR CHEST PA AND LATERAL 2 VIEWS: HCPCS | Performed by: INTERNAL MEDICINE

## 2020-06-25 ENCOUNTER — ANCILLARY PROCEDURE (OUTPATIENT)
Dept: CARDIOLOGY | Age: 80
End: 2020-06-25
Attending: INTERNAL MEDICINE

## 2020-06-25 DIAGNOSIS — Z45.02 ICD (IMPLANTABLE CARDIOVERTER-DEFIBRILLATOR) DISCHARGE: Primary | ICD-10-CM

## 2020-06-25 PROCEDURE — 93284 PRGRMG EVAL IMPLANTABLE DFB: CPT | Performed by: INTERNAL MEDICINE

## 2020-06-25 PROCEDURE — 93290 INTERROG DEV EVAL ICPMS IP: CPT | Performed by: INTERNAL MEDICINE

## 2020-07-24 ENCOUNTER — NURSE TRIAGE (OUTPATIENT)
Dept: INTERNAL MEDICINE CLINIC | Facility: CLINIC | Age: 80
End: 2020-07-24

## 2020-07-24 NOTE — TELEPHONE ENCOUNTER
Action Requested: Summary for Provider     []  Critical Lab, Recommendations Needed  [x] Need Additional Advice  []   FYI    []   Need Orders  [] Need Medications Sent to Pharmacy  []  Other     SUMMARY: Patient calling with left-sided earache, facial swel

## 2020-07-27 ENCOUNTER — TELEPHONE (OUTPATIENT)
Dept: INTERNAL MEDICINE CLINIC | Facility: CLINIC | Age: 80
End: 2020-07-27

## 2020-07-27 NOTE — TELEPHONE ENCOUNTER
Left a message to call back dr Dia Dolan will see her today at 11:30 at the [facility] office

## 2020-07-28 NOTE — TELEPHONE ENCOUNTER
Spoke with patient this afternoon. She states she feels a little better but is declining an appointment today because her sister is in the hospital and not doing well and she wants to be at her side.  I offered tomorrow but patient again not sure what her s

## 2020-08-05 PROBLEM — R09.02 HYPOXIA: Status: RESOLVED | Noted: 2020-02-04 | Resolved: 2020-08-05

## 2020-08-05 PROBLEM — M10.9 GOUT: Status: ACTIVE | Noted: 2020-08-05

## 2020-08-05 PROBLEM — R25.2 LEG CRAMPS: Status: RESOLVED | Noted: 2020-02-27 | Resolved: 2020-08-05

## 2020-08-05 PROBLEM — R73.9 HYPERGLYCEMIA: Status: ACTIVE | Noted: 2020-08-05

## 2020-08-05 PROBLEM — R25.2 JERKING MOVEMENTS OF EXTREMITIES: Status: RESOLVED | Noted: 2017-06-07 | Resolved: 2020-08-05

## 2020-08-05 PROBLEM — R29.6 FALLS FREQUENTLY: Status: RESOLVED | Noted: 2020-02-10 | Resolved: 2020-08-05

## 2020-08-05 RX ORDER — ATORVASTATIN CALCIUM 40 MG/1
TABLET, FILM COATED ORAL
Qty: 90 TABLET | Refills: 1 | Status: SHIPPED | OUTPATIENT
Start: 2020-08-05 | End: 2021-01-29

## 2020-08-05 RX ORDER — ALLOPURINOL 300 MG/1
TABLET ORAL
Qty: 90 TABLET | Refills: 1 | Status: SHIPPED | OUTPATIENT
Start: 2020-08-05 | End: 2021-01-29

## 2020-08-06 ENCOUNTER — OFFICE VISIT (OUTPATIENT)
Dept: INTERNAL MEDICINE CLINIC | Facility: CLINIC | Age: 80
End: 2020-08-06
Payer: MEDICARE

## 2020-08-06 ENCOUNTER — TELEPHONE (OUTPATIENT)
Dept: INTERNAL MEDICINE CLINIC | Facility: CLINIC | Age: 80
End: 2020-08-06

## 2020-08-06 VITALS
DIASTOLIC BLOOD PRESSURE: 78 MMHG | BODY MASS INDEX: 28.45 KG/M2 | HEART RATE: 89 BPM | HEIGHT: 64 IN | OXYGEN SATURATION: 98 % | RESPIRATION RATE: 19 BRPM | TEMPERATURE: 97 F | WEIGHT: 166.63 LBS | SYSTOLIC BLOOD PRESSURE: 144 MMHG

## 2020-08-06 DIAGNOSIS — I42.0 DILATED CARDIOMYOPATHY (HCC): ICD-10-CM

## 2020-08-06 DIAGNOSIS — E53.8 B12 DEFICIENCY: ICD-10-CM

## 2020-08-06 DIAGNOSIS — M10.00 ACUTE IDIOPATHIC GOUT, UNSPECIFIED SITE: ICD-10-CM

## 2020-08-06 DIAGNOSIS — I50.21 ACUTE SYSTOLIC CHF (CONGESTIVE HEART FAILURE), NYHA CLASS 1 (HCC): Primary | ICD-10-CM

## 2020-08-06 DIAGNOSIS — E78.2 MIXED HYPERLIPIDEMIA: ICD-10-CM

## 2020-08-06 DIAGNOSIS — I35.9 AORTIC VALVE DISORDER: ICD-10-CM

## 2020-08-06 DIAGNOSIS — I42.8 NONISCHEMIC CARDIOMYOPATHY (HCC): ICD-10-CM

## 2020-08-06 DIAGNOSIS — I44.7 LBBB (LEFT BUNDLE BRANCH BLOCK): ICD-10-CM

## 2020-08-06 DIAGNOSIS — E03.8 OTHER SPECIFIED HYPOTHYROIDISM: ICD-10-CM

## 2020-08-06 DIAGNOSIS — N28.9 RENAL INSUFFICIENCY: ICD-10-CM

## 2020-08-06 DIAGNOSIS — E55.9 VITAMIN D DEFICIENCY: ICD-10-CM

## 2020-08-06 DIAGNOSIS — R76.8 ANA POSITIVE: ICD-10-CM

## 2020-08-06 DIAGNOSIS — R29.6 FALLS FREQUENTLY: ICD-10-CM

## 2020-08-06 DIAGNOSIS — K59.01 SLOW TRANSIT CONSTIPATION: ICD-10-CM

## 2020-08-06 DIAGNOSIS — G47.09 OTHER INSOMNIA: ICD-10-CM

## 2020-08-06 DIAGNOSIS — I44.7 LEFT BUNDLE BRANCH BLOCK (LBBB) ON ELECTROCARDIOGRAM: ICD-10-CM

## 2020-08-06 DIAGNOSIS — I27.20 PULMONARY HYPERTENSION (HCC): ICD-10-CM

## 2020-08-06 DIAGNOSIS — M51.37 DEGENERATION OF LUMBAR OR LUMBOSACRAL INTERVERTEBRAL DISC: ICD-10-CM

## 2020-08-06 DIAGNOSIS — E04.2 MULTIPLE THYROID NODULES: ICD-10-CM

## 2020-08-06 DIAGNOSIS — G43.009 MIGRAINE WITHOUT AURA AND WITHOUT STATUS MIGRAINOSUS, NOT INTRACTABLE: ICD-10-CM

## 2020-08-06 DIAGNOSIS — Z71.85 VACCINE COUNSELING: ICD-10-CM

## 2020-08-06 DIAGNOSIS — R74.8 ELEVATED ALKALINE PHOSPHATASE MEASUREMENT: ICD-10-CM

## 2020-08-06 DIAGNOSIS — R73.9 HYPERGLYCEMIA: ICD-10-CM

## 2020-08-06 DIAGNOSIS — Z00.00 ENCOUNTER FOR ANNUAL HEALTH EXAMINATION: ICD-10-CM

## 2020-08-06 DIAGNOSIS — E79.0 ELEVATED BLOOD URIC ACID LEVEL: ICD-10-CM

## 2020-08-06 DIAGNOSIS — I10 ESSENTIAL HYPERTENSION: ICD-10-CM

## 2020-08-06 LAB
ALBUMIN SERPL-MCNC: 3.2 G/DL (ref 3.4–5)
ALBUMIN/GLOB SERPL: 0.7 {RATIO} (ref 1–2)
ALP LIVER SERPL-CCNC: 179 U/L (ref 55–142)
ALT SERPL-CCNC: 19 U/L (ref 13–56)
ALT SERPL-CCNC: 19 UNITS/L
ANION GAP SERPL CALC-SCNC: 7 MMOL/L (ref 0–18)
APPEARANCE: CLEAR
AST SERPL-CCNC: 16 U/L (ref 15–37)
AST SERPL-CCNC: 16 UNITS/L
BASOPHILS # BLD AUTO: 0.02 X10(3) UL (ref 0–0.2)
BASOPHILS NFR BLD AUTO: 0.3 %
BILIRUB SERPL-MCNC: 0.8 MG/DL (ref 0.1–2)
BUN BLD-MCNC: 12 MG/DL (ref 7–18)
BUN SERPL-MCNC: 12 MG/DL
BUN/CREAT SERPL: 11.4 (ref 10–20)
CALCIUM BLD-MCNC: 8.9 MG/DL (ref 8.5–10.1)
CALCIUM SERPL-MCNC: 8.9 MG/DL
CHLORIDE SERPL-SCNC: 106 MMOL/L
CHLORIDE SERPL-SCNC: 106 MMOL/L (ref 98–112)
CHOLEST SERPL-MCNC: 146 MG/DL
CHOLEST SMN-MCNC: 146 MG/DL (ref ?–200)
CO2 SERPL-SCNC: 28 MMOL/L
CO2 SERPL-SCNC: 28 MMOL/L (ref 21–32)
CREAT BLD-MCNC: 1.05 MG/DL (ref 0.55–1.02)
DEPRECATED RDW RBC AUTO: 50.4 FL (ref 35.1–46.3)
EOSINOPHIL # BLD AUTO: 0.25 X10(3) UL (ref 0–0.7)
EOSINOPHIL NFR BLD AUTO: 3.7 %
ERYTHROCYTE [DISTWIDTH] IN BLOOD BY AUTOMATED COUNT: 15.3 % (ref 11–15)
EST. AVERAGE GLUCOSE BLD GHB EST-MCNC: 123 MG/DL (ref 68–126)
GLOBULIN PLAS-MCNC: 4.6 G/DL (ref 2.8–4.4)
GLUCOSE BLD-MCNC: 101 MG/DL (ref 70–99)
GLUCOSE SERPL-MCNC: 101 MG/DL
HBA1C MFR BLD HPLC: 5.9 % (ref ?–5.7)
HCT VFR BLD AUTO: 40.1 % (ref 35–48)
HCT VFR BLD CALC: 40.1 %
HDLC SERPL-MCNC: 45 MG/DL (ref 40–59)
HGB BLD-MCNC: 12.5 G/DL
HGB BLD-MCNC: 12.5 G/DL (ref 12–16)
IMM GRANULOCYTES # BLD AUTO: 0.02 X10(3) UL (ref 0–1)
IMM GRANULOCYTES NFR BLD: 0.3 %
LDLC SERPL CALC-MCNC: 65 MG/DL
LDLC SERPL CALC-MCNC: 65 MG/DL (ref ?–100)
LYMPHOCYTES # BLD AUTO: 1.52 X10(3) UL (ref 1–4)
LYMPHOCYTES NFR BLD AUTO: 22.3 %
M PROTEIN MFR SERPL ELPH: 7.8 G/DL (ref 6.4–8.2)
MCH RBC QN AUTO: 28 PG (ref 26–34)
MCHC RBC AUTO-ENTMCNC: 31.2 G/DL (ref 31–37)
MCV RBC AUTO: 89.7 FL (ref 80–100)
MONOCYTES # BLD AUTO: 0.43 X10(3) UL (ref 0.1–1)
MONOCYTES NFR BLD AUTO: 6.3 %
MULTISTIX EXPIRATION DATE: ABNORMAL DATE
MULTISTIX LOT#: 1044 NUMERIC
NEUTROPHILS # BLD AUTO: 4.59 X10 (3) UL (ref 1.5–7.7)
NEUTROPHILS # BLD AUTO: 4.59 X10(3) UL (ref 1.5–7.7)
NEUTROPHILS NFR BLD AUTO: 67.1 %
NONHDLC SERPL-MCNC: 101 MG/DL
NONHDLC SERPL-MCNC: 101 MG/DL (ref ?–130)
OSMOLALITY SERPL CALC.SUM OF ELEC: 292 MOSM/KG (ref 275–295)
PATIENT FASTING Y/N/NP: YES
PATIENT FASTING Y/N/NP: YES
PH, URINE: 7.5 (ref 4.5–8)
PLATELET # BLD AUTO: 160 10(3)UL (ref 150–450)
PLATELET # BLD: 160 K/MCL
POTASSIUM SERPL-SCNC: 3.7 MMOL/L
POTASSIUM SERPL-SCNC: 3.7 MMOL/L (ref 3.5–5.1)
RBC # BLD AUTO: 4.47 X10(6)UL (ref 3.8–5.3)
RBC # BLD: 4.47 10*6/UL
SODIUM SERPL-SCNC: 141 MMOL/L
SODIUM SERPL-SCNC: 141 MMOL/L (ref 136–145)
SPECIFIC GRAVITY: 1.02 (ref 1–1.03)
TRIGL SERPL-MCNC: 180 MG/DL (ref 30–149)
TRIGL SERPL-MCNC: 45 MG/DL
TSH SERPL-ACNC: 0.62 MCUNITS/ML
TSI SER-ACNC: 0.62 MIU/ML (ref 0.36–3.74)
URATE SERPL-MCNC: 3.4 MG/DL (ref 2.6–6)
URINE-COLOR: YELLOW
UROBILINOGEN,SEMI-QN: 2 MG/DL (ref 0–1.9)
VIT B12 SERPL-MCNC: 256 PG/ML (ref 193–986)
VLDLC SERPL CALC-MCNC: 36 MG/DL (ref 0–30)
WBC # BLD AUTO: 6.8 X10(3) UL (ref 4–11)
WBC # BLD: 6.8 K/MCL

## 2020-08-06 PROCEDURE — 81002 URINALYSIS NONAUTO W/O SCOPE: CPT | Performed by: INTERNAL MEDICINE

## 2020-08-06 PROCEDURE — 3077F SYST BP >= 140 MM HG: CPT | Performed by: INTERNAL MEDICINE

## 2020-08-06 PROCEDURE — 96160 PT-FOCUSED HLTH RISK ASSMT: CPT | Performed by: INTERNAL MEDICINE

## 2020-08-06 PROCEDURE — G0439 PPPS, SUBSEQ VISIT: HCPCS | Performed by: INTERNAL MEDICINE

## 2020-08-06 PROCEDURE — 3078F DIAST BP <80 MM HG: CPT | Performed by: INTERNAL MEDICINE

## 2020-08-06 PROCEDURE — 36415 COLL VENOUS BLD VENIPUNCTURE: CPT | Performed by: INTERNAL MEDICINE

## 2020-08-06 PROCEDURE — 99397 PER PM REEVAL EST PAT 65+ YR: CPT | Performed by: INTERNAL MEDICINE

## 2020-08-06 PROCEDURE — 3008F BODY MASS INDEX DOCD: CPT | Performed by: INTERNAL MEDICINE

## 2020-08-06 RX ORDER — METOPROLOL SUCCINATE 25 MG/1
TABLET, EXTENDED RELEASE ORAL
Qty: 90 TABLET | Refills: 1 | Status: SHIPPED | OUTPATIENT
Start: 2020-08-06 | End: 2020-12-11

## 2020-08-06 RX ORDER — ESCITALOPRAM OXALATE 10 MG/1
10 TABLET ORAL DAILY
Qty: 30 TABLET | Refills: 1 | Status: SHIPPED | OUTPATIENT
Start: 2020-08-06 | End: 2020-12-30

## 2020-08-06 NOTE — PROGRESS NOTES
HPI:    Patient ID: Julia Meek is a 70year old female. Julia eMek is a 70year old female who presents for a complete physical exam.   HPI:   Patient presents with:  Wellness Visit: medicare annual       No LMP recorded.  Patient is postmenopausal. tablet 1   • escitalopram 10 MG Oral Tab Take 1 tablet (10 mg total) by mouth daily.  30 tablet 1   • ALLOPURINOL 300 MG Oral Tab TAKE 1 TABLET(300 MG) BY MOUTH EVERY DAY 90 tablet 1   • ATORVASTATIN 40 MG Oral Tab TAKE 1 TABLET(40 MG) BY MOUTH EVERY NIGHT ENDOSCOPY,EXAM        Family History   Problem Relation Age of Onset   • Hypertension Mother    • Heart Disease Mother 36        CAD S/P MI.    • Cancer Brother         Lung cancer. Construction.        Social History:  Social History    Socioeconomic Histo PM    T4F 1.2 02/04/2020 05:03 AM        Lab Results   Component Value Date/Time    CHOLEST 157 02/04/2020 05:03 AM    HDL 47 02/04/2020 05:03 AM    TRIG 112 02/04/2020 05:03 AM    LDL 88 02/04/2020 05:03 AM    NONHDLC 110 02/04/2020 05:03 AM           Wt constipation, diarrhea, nausea, rectal pain and vomiting. Endocrine: Negative for cold intolerance, heat intolerance, polydipsia, polyphagia and polyuria.    Genitourinary: Negative for decreased urine volume, difficulty urinating, dysuria, flank pain, fr MCG Oral Tab TAKE 1 TABLET(125 MCG) BY MOUTH BEFORE BREAKFAST 90 tablet 1   • Vitamin B12 100 MCG Oral Tab Take 100 mcg by mouth daily. • Sacubitril-Valsartan 24-26 MG Oral Tab Take 1 tablet by mouth 2 (two) times daily.  60 tablet 3   • Cholecalciferol Pulse 89   Temp 97.2 °F (36.2 °C) (Temporal)   Resp 19   Ht 5' 4\" (1.626 m)   Wt 166 lb 9.6 oz (75.6 kg)   SpO2 98%   BMI 28.60 kg/m²   BP Readings from Last 3 Encounters:  08/06/20 : 144/78  06/16/20 : 136/83  03/03/20 : 112/60    Wt Readings from Last 3 and not cyanotic. She does not have dentures. No oral lesions. No trismus in the jaw. No dental abscesses, uvula swelling, lacerations or dental caries.  No oropharyngeal exudate, posterior oropharyngeal edema, posterior oropharyngeal erythema or tonsillar exhibits no inverted nipple, no mass, no nipple discharge, no skin change and no tenderness. Left breast exhibits no inverted nipple, no mass, no nipple discharge, no skin change and no tenderness. No breast swelling, tenderness, discharge or bleeding.  Alisa 1 (hcc)  (primary encounter diagnosis) Clinically, euvolemic. Consuelo positive Not Lupus. False +. B12 deficiency Check blood. Degeneration of lumbar or lumbosacral intervertebral disc Stable. Dilated cardiomyopathy (hcc)Stable.        Elevated Hyperglycemia Check blood.      Orders Placed This Encounter      Lipid Panel [E]      CBC W Differential W Platelet [E]      Comp Metabolic Panel (14) [E]      TSH W Reflex To Free T4 [E]      Hemoglobin A1C [E]      Vitamin B12      Uric Acid, Serum [ Alcohol abuse and had a score of 0 so is at low risk.     Patient Care Team: Patient Care Team:  Myesha Kelly MD as PCP - General (Internal Medicine)  Myesha Kelly MD as PCP - [facility]  Padilla Phan MD ([facility])  Tricia Lewis MD as Consulting 10 MG Oral Tab, Take 1 tablet (10 mg total) by mouth daily.   ALLOPURINOL 300 MG Oral Tab, TAKE 1 TABLET(300 MG) BY MOUTH EVERY DAY  ATORVASTATIN 40 MG Oral Tab, TAKE 1 TABLET(40 MG) BY MOUTH EVERY NIGHT  Aspirin Buf,CaCarb-MgCarb-MgO, 81 MG Oral Tab, Take encounter: 5' 4\" (1.626 m). Weight as of this encounter: 166 lb 9.6 oz (75.6 kg).     Medicare Hearing Assessment  (Required for AWV/SWV)    Hearing Screening    Screening Method:  Questionnaire  I have a problem hearing over the telephone:  No I have t 01/19/2016   • Pneumococcal (Prevnar 7) 01/03/2012   • Pneumovax 23 10/01/2009, 01/03/2012, 03/12/2018   Pended Date(s) Pended   • FLUAD High Dose 65 yr and older (78827) 09/20/2018   Deferred Date(s) Deferred   • >=3 YRS TRI  MULTIDOSE VIAL (31729) FLU CL weeks.     Diet assessment: good     PLAN:  The patient indicates understanding of these issues and agrees to the plan. Reinforced healthy diet, lifestyle, and exercise. Imaging studies ordered. Lab work ordered. Further testing ordered.     No follow-u (CPT=77080) 08/15/2016    No flowsheet data found. Pap and Pelvic      Pap: Every 3 yrs age 21-68 or Pap+HPV every 5 yrs age 33-67, age 72 and older at high risk There are no preventive care reminders to display for this patient.  Update [facility] Creatinine (mg/dL)   Date Value   06/11/2020 1.08 (H)    No flowsheet data found. Drug Serum Conc  Annually No results found for: DIGOXIN, DIG, VALP No flowsheet data found.

## 2020-08-06 NOTE — PATIENT INSTRUCTIONS
ASSESSMENT/PLAN:   Acute systolic chf (congestive heart failure), nyha class 1 (hcc)  (primary encounter diagnosis) Clinically, euhthyroid. Consuelo positive Not Lupus. False +. B12 deficiency Check blood.      Degeneration of lumbar or lumbosacral Orders Placed This Encounter      Lipid Panel [E]      CBC W Differential W Platelet [E]      Comp Metabolic Panel (14) [E]      TSH W Reflex To Free T4 [E]      Hemoglobin A1C [E]      Vitamin B12      Uric Acid, Serum [E]      Meds This Visit:  Reque medical reasons Electrocardiogram date06/15/2020 Routine EKG is not a screening covered service except at the [place] to Medicare Visit    Abdominal aortic aneurysm screening (once between ages 73-68) IPPE only No results found for this or any previous vis display for this patient. Chlamydia  Annually if high risk No results found for: CHLAMYDIA No flowsheet data found.     Screening Mammogram      Mammogram    Recommend Annually to at least age 76, and as needed after 76 There are no preventive care vidhya http://www. [place]ph.state. [place]/public/books/advin.htm  A link to the [employer]. This site has a lot of good information including definitions of the different types of Advance Directives.  It also has the State forms available on it's webs

## 2020-08-07 ENCOUNTER — TELEPHONE (OUTPATIENT)
Dept: INTERNAL MEDICINE CLINIC | Facility: CLINIC | Age: 80
End: 2020-08-07

## 2020-08-07 NOTE — TELEPHONE ENCOUNTER
Patient called in to clarify directions on medication below. She states that during the office visit she was told something else, but the directions on the bottle state to take at 12pm.     Please advise.        Current Outpatient Medications:   •  Meto

## 2020-08-07 NOTE — TELEPHONE ENCOUNTER
Verified pt name and . Pt states at yesterday's OV she wrote down instructions Metoprolol 50 mg in the morning and at night and take an additional 25 mg Metoprolol at night with the Metoprolol 50 mg at night.  Reviewed Metoprolol instructions from doctor

## 2020-08-11 ENCOUNTER — TELEPHONE (OUTPATIENT)
Dept: INTERNAL MEDICINE CLINIC | Facility: CLINIC | Age: 80
End: 2020-08-11

## 2020-08-11 NOTE — TELEPHONE ENCOUNTER
Patient calling and requesting appointment for the Vit B 12 shot (see below), appointment made.     Future Appointments   Date Time Provider Homero Patel   8/13/2020  1:00 PM [facility] Melissa Anderson [phone]       Dr Adair=please order the Vit

## 2020-08-13 ENCOUNTER — NURSE ONLY (OUTPATIENT)
Dept: INTERNAL MEDICINE CLINIC | Facility: CLINIC | Age: 80
End: 2020-08-13
Payer: MEDICARE

## 2020-08-13 DIAGNOSIS — E53.8 B12 DEFICIENCY: Primary | ICD-10-CM

## 2020-08-13 PROCEDURE — 96372 THER/PROPH/DIAG INJ SC/IM: CPT | Performed by: INTERNAL MEDICINE

## 2020-08-13 RX ORDER — CYANOCOBALAMIN 1000 UG/ML
1000 INJECTION INTRAMUSCULAR; SUBCUTANEOUS ONCE
Status: COMPLETED | OUTPATIENT
Start: 2020-08-13 | End: 2020-08-13

## 2020-08-13 RX ADMIN — CYANOCOBALAMIN 1000 MCG: 1000 INJECTION INTRAMUSCULAR; SUBCUTANEOUS at 13:31:00

## 2020-08-13 NOTE — PROGRESS NOTES
Order was verified, along with patient's name, date of birth and injection received. Patient was given the Vitamin B12 injection in the right deltoid and patient tolerated the injection well.

## 2020-08-20 ENCOUNTER — NURSE ONLY (OUTPATIENT)
Dept: INTERNAL MEDICINE CLINIC | Facility: CLINIC | Age: 80
End: 2020-08-20
Payer: MEDICARE

## 2020-08-20 DIAGNOSIS — E53.8 VITAMIN B12 DEFICIENCY: Primary | ICD-10-CM

## 2020-08-20 PROCEDURE — 96372 THER/PROPH/DIAG INJ SC/IM: CPT | Performed by: INTERNAL MEDICINE

## 2020-08-20 RX ORDER — CYANOCOBALAMIN 1000 UG/ML
1000 INJECTION INTRAMUSCULAR; SUBCUTANEOUS ONCE
Status: COMPLETED | OUTPATIENT
Start: 2020-08-20 | End: 2020-08-20

## 2020-08-20 RX ADMIN — CYANOCOBALAMIN 1000 MCG: 1000 INJECTION INTRAMUSCULAR; SUBCUTANEOUS at 13:11:00

## 2020-08-27 ENCOUNTER — NURSE ONLY (OUTPATIENT)
Dept: INTERNAL MEDICINE CLINIC | Facility: CLINIC | Age: 80
End: 2020-08-27
Payer: MEDICARE

## 2020-08-27 VITALS — TEMPERATURE: 97 F

## 2020-08-27 DIAGNOSIS — E53.8 VITAMIN B 12 DEFICIENCY: Primary | ICD-10-CM

## 2020-08-27 PROCEDURE — 96372 THER/PROPH/DIAG INJ SC/IM: CPT | Performed by: INTERNAL MEDICINE

## 2020-08-27 RX ORDER — CYANOCOBALAMIN 1000 UG/ML
1000 INJECTION INTRAMUSCULAR; SUBCUTANEOUS ONCE
Status: COMPLETED | OUTPATIENT
Start: 2020-08-27 | End: 2020-08-27

## 2020-08-27 RX ADMIN — CYANOCOBALAMIN 1000 MCG: 1000 INJECTION INTRAMUSCULAR; SUBCUTANEOUS at 13:11:00

## 2020-09-03 ENCOUNTER — NURSE ONLY (OUTPATIENT)
Dept: INTERNAL MEDICINE CLINIC | Facility: CLINIC | Age: 80
End: 2020-09-03
Payer: MEDICARE

## 2020-09-03 DIAGNOSIS — E53.8 VITAMIN B 12 DEFICIENCY: Primary | ICD-10-CM

## 2020-09-03 PROCEDURE — 96372 THER/PROPH/DIAG INJ SC/IM: CPT | Performed by: INTERNAL MEDICINE

## 2020-09-03 RX ORDER — CYANOCOBALAMIN 1000 UG/ML
1000 INJECTION INTRAMUSCULAR; SUBCUTANEOUS ONCE
Status: COMPLETED | OUTPATIENT
Start: 2020-09-03 | End: 2020-09-03

## 2020-09-03 RX ADMIN — CYANOCOBALAMIN 1000 MCG: 1000 INJECTION INTRAMUSCULAR; SUBCUTANEOUS at 14:47:00

## 2020-09-13 RX ORDER — LEVOTHYROXINE SODIUM 0.12 MG/1
TABLET ORAL
Qty: 90 TABLET | Refills: 1 | Status: SHIPPED | OUTPATIENT
Start: 2020-09-13 | End: 2021-01-29

## 2020-09-23 PROCEDURE — 93296 REM INTERROG EVL PM/IDS: CPT | Performed by: INTERNAL MEDICINE

## 2020-10-05 NOTE — ASSESSMENT & PLAN NOTE
Patient was in the hospital with new onset heart failure. And a nonischemic cardiomyopathy ejection fraction 25 to 30% at least moderate mitral regurgitation. She also had moderate to severe pulmonary hypertension.  She has been seeing Steph Brizuela in the heart fa

## 2020-10-06 ENCOUNTER — OFFICE VISIT (OUTPATIENT)
Dept: INTERNAL MEDICINE CLINIC | Facility: CLINIC | Age: 80
End: 2020-10-06
Payer: MEDICARE

## 2020-10-06 ENCOUNTER — MED REC SCAN ONLY (OUTPATIENT)
Dept: INTERNAL MEDICINE CLINIC | Facility: CLINIC | Age: 80
End: 2020-10-06

## 2020-10-06 VITALS
TEMPERATURE: 97 F | BODY MASS INDEX: 29 KG/M2 | OXYGEN SATURATION: 93 % | HEART RATE: 83 BPM | DIASTOLIC BLOOD PRESSURE: 72 MMHG | WEIGHT: 168 LBS | SYSTOLIC BLOOD PRESSURE: 137 MMHG

## 2020-10-06 DIAGNOSIS — I10 ESSENTIAL HYPERTENSION: ICD-10-CM

## 2020-10-06 DIAGNOSIS — I50.21 ACUTE SYSTOLIC CHF (CONGESTIVE HEART FAILURE), NYHA CLASS 1 (HCC): Primary | ICD-10-CM

## 2020-10-06 DIAGNOSIS — M10.00 ACUTE IDIOPATHIC GOUT, UNSPECIFIED SITE: ICD-10-CM

## 2020-10-06 DIAGNOSIS — I42.0 DILATED CARDIOMYOPATHY (HCC): ICD-10-CM

## 2020-10-06 DIAGNOSIS — E53.8 B12 DEFICIENCY: ICD-10-CM

## 2020-10-06 DIAGNOSIS — Z71.85 VACCINE COUNSELING: ICD-10-CM

## 2020-10-06 DIAGNOSIS — R73.9 HYPERGLYCEMIA: ICD-10-CM

## 2020-10-06 DIAGNOSIS — E04.2 MULTIPLE THYROID NODULES: ICD-10-CM

## 2020-10-06 DIAGNOSIS — I44.7 LEFT BUNDLE BRANCH BLOCK (LBBB) ON ELECTROCARDIOGRAM: ICD-10-CM

## 2020-10-06 DIAGNOSIS — H61.21 IMPACTED CERUMEN OF RIGHT EAR: ICD-10-CM

## 2020-10-06 PROCEDURE — 69210 REMOVE IMPACTED EAR WAX UNI: CPT | Performed by: INTERNAL MEDICINE

## 2020-10-06 PROCEDURE — G0008 ADMIN INFLUENZA VIRUS VAC: HCPCS | Performed by: INTERNAL MEDICINE

## 2020-10-06 PROCEDURE — 3075F SYST BP GE 130 - 139MM HG: CPT | Performed by: INTERNAL MEDICINE

## 2020-10-06 PROCEDURE — 99214 OFFICE O/P EST MOD 30 MIN: CPT | Performed by: INTERNAL MEDICINE

## 2020-10-06 PROCEDURE — 3078F DIAST BP <80 MM HG: CPT | Performed by: INTERNAL MEDICINE

## 2020-10-06 PROCEDURE — 90662 IIV NO PRSV INCREASED AG IM: CPT | Performed by: INTERNAL MEDICINE

## 2020-10-06 RX ORDER — TIZANIDINE 4 MG/1
4 TABLET ORAL NIGHTLY
Qty: 30 TABLET | Refills: 1 | Status: SHIPPED | OUTPATIENT
Start: 2020-10-06 | End: 2020-12-08 | Stop reason: ALTCHOICE

## 2020-10-06 RX ORDER — CYANOCOBALAMIN 1000 UG/ML
1000 INJECTION INTRAMUSCULAR; SUBCUTANEOUS ONCE
Status: SHIPPED | OUTPATIENT
Start: 2020-10-06

## 2020-10-06 RX ORDER — METHYLPREDNISOLONE 4 MG/1
TABLET ORAL
Qty: 1 KIT | Refills: 0 | Status: ON HOLD | OUTPATIENT
Start: 2020-10-06 | End: 2020-12-03

## 2020-10-06 NOTE — PATIENT INSTRUCTIONS
ASSESSMENT/PLAN:   Acute systolic chf (congestive heart failure), nyha class 1 (hcc)  (primary encounter diagnosis) Stable. Dilated cardiomyopathy (hcc) currently euvolemic. Hyperglycemia Lower carbs.      Acute idiopathic gout, unspecified site Stab after a simple awkward movement. In either case, muscle spasm is often present and adds to the pain. Acute neck and back pain usually gets better in 1 to 2 weeks.  Pain related to disk disease, arthritis in the spinal joints or spinal stenosis (narrowing o try to find a position of comfort. A firm mattress is best. Try lying flat on your back with pillows under your knees. You can also try lying on your side with your knees bent up towards your chest and a pillow between your knees.   · At first, do not try t needed. If X-rays were taken, you will be notified of any new findings that may affect your care.   Call 911  Call 911 if any of the following occur:  · Trouble breathing  · Confusion  · Very drowsy or trouble awakening  · Fainting or loss of consciousness strain  This problem often gets better on its own. Treatments aim to reduce pain and inflammation and increase the range of motion of the neck. Possible treatments include:  · Over-the-counter or prescription pain medicine.  These help relieve pain and infl Do this exercise on your hands and knees. Keep your knees under your hips and your hands under your shoulders. Keep your spine in a neutral position (not arched or sagging). Keep your ears in line with your shoulders.  Hold for a few seconds before starti shoulders, and hips in line. Your feet should be slightly apart, positioned just under your hips. Focus your eyes directly in front of you.  this position for a few seconds before starting your exercise.  This helps increase your awareness of proper Release. · Repeat 5 times. For your safety, check with your healthcare provider before starting an exercise program.  Date Last Reviewed: 11/1/2017  © 5999-7421 [employer]. [address] [facility], [address] [place] [address]. All rights reserved.  Gina Markham for professional medical care. Always follow your healthcare professional's instructions. Neck Exercises: Arm Lift            To start, lie on your back, knees bent and feet flat on the floor.  Keep your ears, shoulders, and hips aligned, but don’t p healthcare professional's instructions. Shoulder External Rotation, Isometric (Strength)    4. Bend your right arm in front of your body, palm up. Hold your right wrist with your left hand.   5. Try to push your right arm outward, while pulling back the outer edge of the clock in a clockwise direction. Move your neck smoothly. Don’t force your head or neck. 14. Repeat 5 times, or as instructed. 15. Then switch to a counterclockwise direction, and repeat the exercise 5 times, or as instructed.      Ch minutes. Remove the towel. Then do any exercises recommended to you by your healthcare provider. Physical therapy  If self-care treatments aren’t helping relieve neck pain, your healthcare provider may suggest physical therapy.  Physical therapy is done by have related to the way you hold and move your body. The tips below can help you improve your posture and body mechanics. What is posture and why does it matter? Posture is the way you hold your body.  For many of us, this means hunching over, thrusting t feet flat on the floor. If your feet don’t touch the floor, use a footrest.  · Avoid sitting or driving for long periods. Take breaks often.   Sleeping   To protect your neck while sleeping:  · Sleep on your back with a pillow under your knees or on your si achieve the results you want. But once your shoulder heals, it rarely becomes frozen again. So stick to your stretching program. If you have any questions, be sure to ask your healthcare provider.   Ludy last reviewed this educational content on 3/1/201 4898 [address], [facility] [place]. All rights reserved. This information is not intended as a substitute for professional medical care. Always follow your healthcare professional's instructions.         Exercises for Shoulder Flexibility: Back Scratch    Improvi wall.  · Raise your arm to shoulder level and gently “walk” your fingers up the wall as high as you can. · Hold for a few seconds. Then walk your fingers back down. · Repeat 3 times. Move closer to the wall as you repeat.   · Build up to holding each stre to shoulder height. · Slowly move your forearms together. Hold for 5 seconds. · Return to starting position. Repeat 5 times. Ludy last reviewed this educational content on 3/1/2018  © 9199-3103 [employer].  Alter Wall [address] as a substitute for professional medical care. Always follow your healthcare professional's instructions.

## 2020-10-06 NOTE — PROGRESS NOTES
HPI:    Patient ID: Toi Pathak is a 70year old female. Hypertension  Patient is here for follow up of hypertension. BP at home:  140/80's. Has been compliant with medications. Exercise level: somewhat active and has been following low salt diet.   We 12:20 PM    Dedra Xiong 08/06/2020 12:20 PM          Had H/O migraines. Not as bad. Began at R side neck and thought due to pillow. Headache   This is a new problem. The current episode started in the past 7 days. The problem occurs constantly.  The pro polydipsia, polyphagia and polyuria. Genitourinary: Negative for dysuria. Musculoskeletal: Positive for neck pain. Skin: Negative for rash. Allergic/Immunologic: Negative for environmental allergies. Neurological: Positive for headaches.  Negative 30 tablet 1   • Aspirin Buf,CaCarb-MgCarb-MgO, 81 MG Oral Tab Take 81 mg by mouth daily. • Acetaminophen-Codeine #3 300-30 MG Oral Tab Take 1 tablet by mouth every 4 (four) hours as needed.  (Patient not taking: Reported on 8/6/2020 ) 30 tablet 0   • to kg)  06/16/20 : 165 lb 14.4 oz (75.3 kg)      Physical Exam   Constitutional: She is oriented to person, place, and time. Vital signs are normal. She appears well-developed and well-nourished. She is active. Non-toxic appearance.  She does not have a sickl extraocular motion and no nystagmus. Left eye exhibits normal extraocular motion and no nystagmus. Neck: Trachea normal and phonation normal. Neck supple. No tracheal tenderness present. No tracheal deviation present. No thyromegaly present.    Cardiovasc auricular and no occipital adenopathy present. She has no cervical adenopathy. Right cervical: No superficial cervical, no deep cervical and no posterior cervical adenopathy present.        Left cervical: No superficial cervical, no deep cervical medications. Neck excercises.      Orders Placed This Encounter      Fluzone High Dose 65 yr and older PFS [96675]      Removal Impacted Cerumen Using Irrigation/Lavage, Unilateral      Remove Impacted Cerumen      Meds This Visit:  Requested Prescriptions

## 2020-10-07 RX ORDER — ESCITALOPRAM OXALATE 10 MG/1
10 TABLET ORAL DAILY
COMMUNITY
Start: 2020-08-06 | End: 2021-01-26

## 2020-10-07 RX ORDER — METHYLPREDNISOLONE 4 MG/1
4 TABLET ORAL
COMMUNITY
Start: 2020-10-06

## 2020-10-07 RX ORDER — TIZANIDINE 4 MG/1
4 TABLET ORAL NIGHTLY
COMMUNITY
Start: 2020-10-06 | End: 2021-01-04

## 2020-10-09 ENCOUNTER — APPOINTMENT (OUTPATIENT)
Dept: CARDIOLOGY | Age: 80
End: 2020-10-09

## 2020-10-14 ENCOUNTER — NURSE TRIAGE (OUTPATIENT)
Dept: INTERNAL MEDICINE CLINIC | Facility: CLINIC | Age: 80
End: 2020-10-14

## 2020-10-14 NOTE — TELEPHONE ENCOUNTER
Patient indicated that her left leg was shaky and gave out on her and she fell this morning and hit her back on the dresser. No cuts. No bruises. Did not lose consciousness. Did not hit her head. Occasionally having pain in the back but no other symptoms.

## 2020-10-17 RX ORDER — METOPROLOL SUCCINATE 50 MG/1
50 TABLET, EXTENDED RELEASE ORAL 2 TIMES DAILY
Qty: 180 TABLET | Refills: 1 | Status: SHIPPED | OUTPATIENT
Start: 2020-10-17 | End: 2021-04-29

## 2020-10-26 ENCOUNTER — TELEPHONE (OUTPATIENT)
Dept: CARDIOLOGY | Age: 80
End: 2020-10-26

## 2020-11-28 ENCOUNTER — TELEPHONE (OUTPATIENT)
Dept: INTERNAL MEDICINE CLINIC | Facility: CLINIC | Age: 80
End: 2020-11-28

## 2020-11-28 ENCOUNTER — HOSPITAL ENCOUNTER (OUTPATIENT)
Facility: HOSPITAL | Age: 80
Setting detail: OBSERVATION
LOS: 3 days | Discharge: HOME | End: 2020-12-03
Attending: EMERGENCY MEDICINE | Admitting: INTERNAL MEDICINE
Payer: MEDICARE

## 2020-11-28 ENCOUNTER — APPOINTMENT (OUTPATIENT)
Dept: GENERAL RADIOLOGY | Facility: HOSPITAL | Age: 80
End: 2020-11-28
Attending: EMERGENCY MEDICINE
Payer: MEDICARE

## 2020-11-28 DIAGNOSIS — J98.01 BRONCHOSPASM: ICD-10-CM

## 2020-11-28 DIAGNOSIS — R06.03 ACUTE RESPIRATORY DISTRESS: Primary | ICD-10-CM

## 2020-11-28 DIAGNOSIS — N17.9 AKI (ACUTE KIDNEY INJURY) (HCC): ICD-10-CM

## 2020-11-28 LAB
ANION GAP SERPL CALC-SCNC: 6 MMOL/L (ref 0–18)
BASOPHILS # BLD AUTO: 0.02 X10(3) UL (ref 0–0.2)
BASOPHILS NFR BLD AUTO: 0.2 %
BUN BLD-MCNC: 11 MG/DL (ref 7–18)
BUN/CREAT SERPL: 10.4 (ref 10–20)
CALCIUM BLD-MCNC: 8.6 MG/DL (ref 8.5–10.1)
CHLORIDE SERPL-SCNC: 106 MMOL/L (ref 98–112)
CO2 SERPL-SCNC: 32 MMOL/L (ref 21–32)
CREAT BLD-MCNC: 1.06 MG/DL
D DIMER PPP FEU-MCNC: 2.86 UG/ML FEU (ref ?–0.78)
DEPRECATED RDW RBC AUTO: 47.2 FL (ref 35.1–46.3)
EOSINOPHIL # BLD AUTO: 0.35 X10(3) UL (ref 0–0.7)
EOSINOPHIL NFR BLD AUTO: 3.3 %
ERYTHROCYTE [DISTWIDTH] IN BLOOD BY AUTOMATED COUNT: 14.6 % (ref 11–15)
GLUCOSE BLD-MCNC: 144 MG/DL (ref 70–99)
HCT VFR BLD AUTO: 38.4 %
HGB BLD-MCNC: 12.3 G/DL
IMM GRANULOCYTES # BLD AUTO: 0.02 X10(3) UL (ref 0–1)
IMM GRANULOCYTES NFR BLD: 0.2 %
LYMPHOCYTES # BLD AUTO: 0.86 X10(3) UL (ref 1–4)
LYMPHOCYTES NFR BLD AUTO: 8.2 %
MCH RBC QN AUTO: 28 PG (ref 26–34)
MCHC RBC AUTO-ENTMCNC: 32 G/DL (ref 31–37)
MCV RBC AUTO: 87.3 FL
MONOCYTES # BLD AUTO: 0.49 X10(3) UL (ref 0.1–1)
MONOCYTES NFR BLD AUTO: 4.7 %
NEUTROPHILS # BLD AUTO: 8.71 X10 (3) UL (ref 1.5–7.7)
NEUTROPHILS # BLD AUTO: 8.71 X10(3) UL (ref 1.5–7.7)
NEUTROPHILS NFR BLD AUTO: 83.4 %
NT-PROBNP SERPL-MCNC: 1054 PG/ML (ref ?–450)
OSMOLALITY SERPL CALC.SUM OF ELEC: 300 MOSM/KG (ref 275–295)
PLATELET # BLD AUTO: 157 10(3)UL (ref 150–450)
POTASSIUM SERPL-SCNC: 3.1 MMOL/L (ref 3.5–5.1)
RBC # BLD AUTO: 4.4 X10(6)UL
SARS-COV-2 RNA RESP QL NAA+PROBE: NOT DETECTED
SODIUM SERPL-SCNC: 144 MMOL/L (ref 136–145)
TROPONIN I SERPL-MCNC: <0.045 NG/ML (ref ?–0.04)
WBC # BLD AUTO: 10.5 X10(3) UL (ref 4–11)

## 2020-11-28 PROCEDURE — 99214 OFFICE O/P EST MOD 30 MIN: CPT | Performed by: INTERNAL MEDICINE

## 2020-11-28 PROCEDURE — 71045 X-RAY EXAM CHEST 1 VIEW: CPT | Performed by: EMERGENCY MEDICINE

## 2020-11-28 RX ORDER — POTASSIUM CHLORIDE 20 MEQ/1
40 TABLET, EXTENDED RELEASE ORAL ONCE
Status: COMPLETED | OUTPATIENT
Start: 2020-11-28 | End: 2020-11-29

## 2020-11-28 RX ORDER — ALBUTEROL SULFATE 2.5 MG/3ML
5 SOLUTION RESPIRATORY (INHALATION) ONCE
Status: COMPLETED | OUTPATIENT
Start: 2020-11-28 | End: 2020-11-28

## 2020-11-29 ENCOUNTER — APPOINTMENT (OUTPATIENT)
Dept: CT IMAGING | Facility: HOSPITAL | Age: 80
End: 2020-11-29
Attending: EMERGENCY MEDICINE
Payer: MEDICARE

## 2020-11-29 PROBLEM — G44.209 ACUTE NON INTRACTABLE TENSION-TYPE HEADACHE: Status: ACTIVE | Noted: 2020-11-29

## 2020-11-29 PROBLEM — J98.01 BRONCHOSPASM: Status: ACTIVE | Noted: 2020-11-29

## 2020-11-29 LAB
ADENOVIRUS PCR:: NEGATIVE
B PERT DNA SPEC QL NAA+PROBE: NEGATIVE
C PNEUM DNA SPEC QL NAA+PROBE: NEGATIVE
CORONAVIRUS 229E PCR:: NEGATIVE
CORONAVIRUS HKU1 PCR:: NEGATIVE
CORONAVIRUS NL63 PCR:: NEGATIVE
CORONAVIRUS OC43 PCR:: NEGATIVE
DEPRECATED HBV CORE AB SER IA-ACNC: 74.3 NG/ML
FLUAV RNA SPEC QL NAA+PROBE: NEGATIVE
FLUBV RNA SPEC QL NAA+PROBE: NEGATIVE
METAPNEUMOVIRUS PCR:: NEGATIVE
MYCOPLASMA PNEUMONIA PCR:: NEGATIVE
PARAINFLUENZA 1 PCR:: NEGATIVE
PARAINFLUENZA 2 PCR:: NEGATIVE
PARAINFLUENZA 3 PCR:: NEGATIVE
PARAINFLUENZA 4 PCR:: NEGATIVE
POTASSIUM SERPL-SCNC: 4.6 MMOL/L (ref 3.5–5.1)
PROCALCITONIN SERPL-MCNC: 0.17 NG/ML (ref ?–0.16)
RHINOVIRUS/ENTERO PCR:: POSITIVE
RSV RNA SPEC QL NAA+PROBE: NEGATIVE
SARS-COV-2 RNA RESP QL NAA+PROBE: NOT DETECTED

## 2020-11-29 PROCEDURE — 99223 1ST HOSP IP/OBS HIGH 75: CPT | Performed by: INTERNAL MEDICINE

## 2020-11-29 PROCEDURE — 99214 OFFICE O/P EST MOD 30 MIN: CPT | Performed by: INTERNAL MEDICINE

## 2020-11-29 PROCEDURE — 99222 1ST HOSP IP/OBS MODERATE 55: CPT | Performed by: INTERNAL MEDICINE

## 2020-11-29 PROCEDURE — 71260 CT THORAX DX C+: CPT | Performed by: EMERGENCY MEDICINE

## 2020-11-29 RX ORDER — METHYLPREDNISOLONE SODIUM SUCCINATE 125 MG/2ML
60 INJECTION, POWDER, LYOPHILIZED, FOR SOLUTION INTRAMUSCULAR; INTRAVENOUS EVERY 12 HOURS
Status: DISCONTINUED | OUTPATIENT
Start: 2020-11-30 | End: 2020-12-01

## 2020-11-29 RX ORDER — TIZANIDINE 2 MG/1
4 TABLET ORAL NIGHTLY
Status: DISCONTINUED | OUTPATIENT
Start: 2020-11-29 | End: 2020-12-03

## 2020-11-29 RX ORDER — UBIDECARENONE 75 MG
100 CAPSULE ORAL DAILY
Status: DISCONTINUED | OUTPATIENT
Start: 2020-11-29 | End: 2020-12-03

## 2020-11-29 RX ORDER — METOPROLOL TARTRATE 50 MG/1
50 TABLET, FILM COATED ORAL 2 TIMES DAILY
Status: DISCONTINUED | OUTPATIENT
Start: 2020-11-29 | End: 2020-11-30

## 2020-11-29 RX ORDER — TOPIRAMATE 25 MG/1
25 TABLET ORAL EVERY 12 HOURS SCHEDULED
Status: DISCONTINUED | OUTPATIENT
Start: 2020-11-29 | End: 2020-12-03

## 2020-11-29 RX ORDER — FUROSEMIDE 10 MG/ML
40 INJECTION INTRAMUSCULAR; INTRAVENOUS DAILY
Status: DISCONTINUED | OUTPATIENT
Start: 2020-11-30 | End: 2020-11-30

## 2020-11-29 RX ORDER — IBUPROFEN 400 MG/1
400 TABLET ORAL ONCE
Status: DISCONTINUED | OUTPATIENT
Start: 2020-11-29 | End: 2020-11-29

## 2020-11-29 RX ORDER — HEPARIN SODIUM 5000 [USP'U]/ML
5000 INJECTION, SOLUTION INTRAVENOUS; SUBCUTANEOUS EVERY 8 HOURS SCHEDULED
Status: DISCONTINUED | OUTPATIENT
Start: 2020-11-29 | End: 2020-12-03

## 2020-11-29 RX ORDER — FUROSEMIDE 10 MG/ML
40 INJECTION INTRAMUSCULAR; INTRAVENOUS ONCE
Status: COMPLETED | OUTPATIENT
Start: 2020-11-29 | End: 2020-11-29

## 2020-11-29 RX ORDER — ESCITALOPRAM OXALATE 10 MG/1
10 TABLET ORAL DAILY
Status: DISCONTINUED | OUTPATIENT
Start: 2020-11-29 | End: 2020-12-03

## 2020-11-29 RX ORDER — ASPIRIN 81 MG/1
81 TABLET ORAL DAILY
Status: DISCONTINUED | OUTPATIENT
Start: 2020-11-29 | End: 2020-12-03

## 2020-11-29 RX ORDER — ALBUTEROL SULFATE 90 UG/1
2 AEROSOL, METERED RESPIRATORY (INHALATION) 3 TIMES DAILY
Status: DISCONTINUED | OUTPATIENT
Start: 2020-11-29 | End: 2020-12-01

## 2020-11-29 RX ORDER — TEMAZEPAM 7.5 MG/1
15 CAPSULE ORAL ONCE
Status: COMPLETED | OUTPATIENT
Start: 2020-11-29 | End: 2020-11-29

## 2020-11-29 RX ORDER — ALLOPURINOL 100 MG/1
300 TABLET ORAL DAILY
Status: DISCONTINUED | OUTPATIENT
Start: 2020-11-29 | End: 2020-12-03

## 2020-11-29 RX ORDER — METOPROLOL TARTRATE 50 MG/1
50 TABLET, FILM COATED ORAL
Status: DISCONTINUED | OUTPATIENT
Start: 2020-11-29 | End: 2020-11-29

## 2020-11-29 RX ORDER — ATORVASTATIN CALCIUM 40 MG/1
40 TABLET, FILM COATED ORAL NIGHTLY
Status: DISCONTINUED | OUTPATIENT
Start: 2020-11-29 | End: 2020-12-03

## 2020-11-29 RX ORDER — BENZONATATE 100 MG/1
100 CAPSULE ORAL 3 TIMES DAILY PRN
Status: DISCONTINUED | OUTPATIENT
Start: 2020-11-29 | End: 2020-12-01

## 2020-11-29 RX ORDER — PANTOPRAZOLE SODIUM 40 MG/1
40 TABLET, DELAYED RELEASE ORAL
Status: DISCONTINUED | OUTPATIENT
Start: 2020-11-29 | End: 2020-12-03

## 2020-11-29 RX ORDER — POTASSIUM CHLORIDE 20 MEQ/1
40 TABLET, EXTENDED RELEASE ORAL EVERY 4 HOURS
Status: COMPLETED | OUTPATIENT
Start: 2020-11-29 | End: 2020-11-29

## 2020-11-29 RX ORDER — LEVOTHYROXINE SODIUM 0.12 MG/1
125 TABLET ORAL
Status: DISCONTINUED | OUTPATIENT
Start: 2020-11-29 | End: 2020-12-03

## 2020-11-29 RX ORDER — METHYLPREDNISOLONE SODIUM SUCCINATE 125 MG/2ML
60 INJECTION, POWDER, LYOPHILIZED, FOR SOLUTION INTRAMUSCULAR; INTRAVENOUS ONCE
Status: COMPLETED | OUTPATIENT
Start: 2020-11-29 | End: 2020-11-29

## 2020-11-29 RX ORDER — ACETAMINOPHEN 325 MG/1
650 TABLET ORAL EVERY 8 HOURS PRN
Status: DISCONTINUED | OUTPATIENT
Start: 2020-11-29 | End: 2020-12-03

## 2020-11-29 RX ORDER — LEVOFLOXACIN 5 MG/ML
750 INJECTION, SOLUTION INTRAVENOUS
Status: DISCONTINUED | OUTPATIENT
Start: 2020-11-29 | End: 2020-12-03

## 2020-11-29 RX ORDER — IPRATROPIUM BROMIDE AND ALBUTEROL SULFATE 2.5; .5 MG/3ML; MG/3ML
3 SOLUTION RESPIRATORY (INHALATION) EVERY 6 HOURS PRN
Status: DISCONTINUED | OUTPATIENT
Start: 2020-11-29 | End: 2020-12-03

## 2020-11-29 RX ORDER — MORPHINE SULFATE 4 MG/ML
4 INJECTION, SOLUTION INTRAMUSCULAR; INTRAVENOUS ONCE
Status: COMPLETED | OUTPATIENT
Start: 2020-11-29 | End: 2020-11-29

## 2020-11-29 RX ORDER — METHYLPREDNISOLONE SODIUM SUCCINATE 40 MG/ML
40 INJECTION, POWDER, LYOPHILIZED, FOR SOLUTION INTRAMUSCULAR; INTRAVENOUS EVERY 12 HOURS
Status: DISCONTINUED | OUTPATIENT
Start: 2020-11-29 | End: 2020-11-29

## 2020-11-29 NOTE — CONSULTS
[facility]    Report of Consultation    Bib Sandovalo Patient Status:  Inpatient    1940 MRN [id number]   Location [facility] Attending Sangeeta Pereira MD   Hosp Day # 0 PCP Mariza Oshea MD     Date of Admission:  6 Disease Mother 36        CAD S/P MI.    • Cancer Brother         Lung cancer. Construction.         Social History  Social History    Tobacco Use      Smoking status: Never Smoker      Smokeless tobacco: Never Used    Alcohol use: Yes      Comment: occ    D MOUTH EVERY DAY    •  ATORVASTATIN 40 MG Oral Tab, TAKE 1 TABLET(40 MG) BY MOUTH EVERY NIGHT    •  Acetaminophen-Codeine #3 300-30 MG Oral Tab, Take 1 tablet by mouth every 4 (four) hours as needed.     •  PANTOPRAZOLE SODIUM 40 MG Oral Tab EC, TAKE 1 TABLE 11/28/2020     (H) 11/28/2020    CA 8.6 11/28/2020    ALB 3.2 (L) 08/06/2020    ALKPHO 179 (H) 08/06/2020    TP 7.8 08/06/2020    AST 16 08/06/2020    ALT 19 08/06/2020    INR 1.0 08/18/2015    T4F 1.2 02/04/2020    TSH 0.625 08/06/2020    ADAM 73 04 cardiology    3-DVT prophylaxis  Heparin subcu            Thank you for allowing me to participate in the care of your patient. Patricia Barrera.  Brinda  11/29/2020

## 2020-11-29 NOTE — PLAN OF CARE
Vitals stable on room air. Pt had headache this morning; Tylenol given. Headache improved. COVID PCR negative. Pt to transfer to tele. IV lasix daily. Respiratory panel sent.    Problem: Patient Centered Care  Goal: Patient preferences are identified and in and minimize respiratory effort  - Oxygen supplementation based on oxygen saturation or ABGs  - Provide Smoking Cessation handout, if applicable  - Encourage broncho-pulmonary hygiene including cough, deep breathe, Incentive Spirometry  - Assess the need f frequently for physical needs  - Identify cognitive and physical deficits and behaviors that affect risk of falls.   - Naylor fall precautions as indicated by assessment.  - Educate pt/family on patient safety including physical limitations  - Instruct p

## 2020-11-29 NOTE — PROGRESS NOTES
[facility]  Cardiology Progress Note    Dee Reyes Patient Status:  Inpatient    1940 MRN [id number]   Location [address] [facility] Attending Preeti Nguyen MD   Hosp Day # 0 PCP Nba Bartlett.  Dee Logan MD     Patient seen and examined independ Total Intake -- 480 15       Output    Urine  --  300  1000    Urine -- 300 1000    Total Output -- 300 1000       Net I/O     -- 180 -985           Last 3 Weights  11/29/20 0233 : 161 lb 14.4 oz (73.4 kg)  11/28/20 2158 : 162 lb (73.5 kg)  10/06/20 1329 : Erythromycin            HIVES  Amoxicillin             RASH    Comment:Not allergic    Medications:    •  Sacubitril-Valsartan (ENTRESTO) 24-26 MG per tab 1 tablet, 1 tablet, Oral, BID    •  [START ON 11/30/2020] furosemide (LASIX) injection 40 mg, 40

## 2020-11-29 NOTE — PLAN OF CARE
Problem: Patient Centered Care  Goal: Patient preferences are identified and integrated in the patient's plan of care  Description: Interventions:  - What would you like us to know as we care for you?  I used to work here   - Provide timely, complete, and social influences on pain and pain management  - Manage/alleviate anxiety  - Utilize distraction and/or relaxation techniques  - Monitor for opioid side effects  - Notify MD/LIP if interventions unsuccessful or patient reports new pain  - Anticipate increa patient needs post-hospital services based on physician/LIP order or complex needs related to functional status, cognitive ability or social support system  Outcome: Not Progressing     -monitor  I/o's  - See additional Care Plan goals for specific interve

## 2020-11-29 NOTE — CONSULTS
CARDIOLOGY SERVICE CONSULT      Patient: Ramirez Villanueva Date: 2020     : 1940 Attending: Jayden [facility] MD   68year old female Requested by: Dr. 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    • UPPER GI ENDOSCOPY,EXAM         Social History    Socioeconomic History      Marital status:       Spouse name: Not on file      Number of children: Not on file      Years of education: Not on file      H History Narrative      Not on file      Family History   Problem Relation Age of Onset   • Hypertension Mother    • Heart Disease Mother 36        CAD S/P MI.    • Cancer Brother         Lung cancer. Construction.         Comprehensive Review of Systems: normal. Wall thickness was      increased in a pattern of mild LVH. Systolic function was      normal. The estimated ejection fraction was 25%, by biplane      method of disks. Hypokinesis of the anteroseptal, inferior, and      apical myocardium.  Doppler

## 2020-11-29 NOTE — ED NOTES
Patient currently on continuous neb treatment, approximately 30 minutes per RT. Post neb treatment this RN will administer medication and patient will go for CT chest examination.

## 2020-11-29 NOTE — ED NOTES
Orders for admission, patient is aware of plan and ready to go upstairs. Any questions, please call ED RN Ashleigh Sees  at extension [phone].    Type of COVID test sent: Rapid (not detected);  sent to lab in process  COVID Suspicion level:High    Titratable d

## 2020-11-29 NOTE — PLAN OF CARE
Covid +: Rule out  Symptom Onset:  Tmax: Afebrile  O2: Room Air  90%    Monitor Labs,    LDH:  Ferritin: 74  CRP:  DDimer: 2.86    CT chest: Negative for PE    Antibiotics: on hold  Blood Thinner: Heparin Q8  Decadron: on hold  Actemra: on h

## 2020-11-29 NOTE — PROGRESS NOTES
Count includes the [facility] Pharmacy Note:  Renal Adjustment for levofloxacin (LEVAQUIN)    Maryuri Corona is a 68year old patient who has been prescribed levofloxacin (LEVAQUIN) 500 mg every 24 hrs.   CrCl is estimated creatinine clearance is 37.8 mL/min (A) (based on SCr of 1.06 m

## 2020-11-29 NOTE — ED PROVIDER NOTES
Patient Seen in: [facility] AND [facility] Emergency Department      History   Patient presents with:  Difficulty Breathing  Cough/URI    Stated Complaint: cough    HPI     81year-old with cardiac history, normal sinus block and an ICD here for evaluation of co person, place, and time. Appears well-developed. No distress. Head: Normocephalic and atraumatic. Eyes: Conjunctivae are normal. Pupils are equal, round, and reactive to light. Neck: Normal range of motion. Neck supple.    Cardiovascular: Sinus tachy

## 2020-11-29 NOTE — H&P
[facility] HOSP - [facility]    History and Physical    [facility]:  [facility]    1940 MRN [id number]   Location [facility] Attending Teresa Cesar MD   Hosp Day # 0 PCP Lalo Buchanan MD     Date:  2020  Date of Construction. Social History:  Social History    Tobacco Use      Smoking status: Never Smoker      Smokeless tobacco: Never Used    Alcohol use: Yes      Comment: occ    Drug use: No    Allergies/Medications:    Allergies:   Erythromycin            HI Neurological: Positive for headaches. Negative for dizziness. Psychiatric/Behavioral: Negative. All other systems reviewed and are negative.       Physical Exam:   Vital Signs:  Blood pressure (!) 130/104, pulse 98, temperature 98.2 °F (36.8 °C), tem Dictated by (CST): Nayely Rivers MD on 11/29/2020 at 8:20 AM     Finalized by (CST): Nayely Rivers MD on 11/29/2020 at 8:21 AM          Ct Chest Pe Aorta (iv Only) (cpt=71260)    Result Date: 11/29/2020  CONCLUSION:   No pulmonary embolus in the central, [place]

## 2020-11-29 NOTE — ED PROVIDER NOTES
Patient Seen in: [facility] AND [facility] Emergency Department      History   Patient presents with:  Difficulty Breathing  Cough/URI    Stated Complaint: cough    HPI    77-year-old without diagnosed pulmonary disease with extensive cardiac history including s Physical Exam    Constitutional: Oriented to person, place, and time. Appears well-developed. No distress. Head: Normocephalic and atraumatic. Eyes: Conjunctivae are normal. Pupils are equal, round, and reactive to light.    Neck: Normal range RDW-SD 47.2 (*)     Neutrophil Absolute Prelim 8.71 (*)     Neutrophil Absolute 8.71 (*)     Lymphocyte Absolute 0.86 (*)     All other components within normal limits   TROPONIN I - Normal   FERRITIN - Normal   RAPID SARS-COV-2 BY PCR - Normal   SARS-COV- 8:26 AM.  [facility], XR CHEST AP PORTABLE (CPT=71045), 6/15/2020, 1:52 PM.  [facility], [facility], XR CHEST PA + LAT CHEST (CPT=71046), 6/11/2020, 9:37 AM.  INDICATIONS: Productive cough, congestion, and difficulty breathing respiratory motion artifact. LINES AND TUBES: There is a left-sided pacer with leads within the right atrium, right ventricle, and coronary sinus which is unchanged. .  MEDIASTINUM/VASCULATURE: There are multiple mildly prominent mediastinal lymph nodes w somewhat here. Rapid Covid is negative D-dimer is quite markedly high she will get a CT of her chest.  She has no known underlying lung disease.   Obtain a confirmatory Covid test but the patient will need to be admitted to the hospital.  She was given 2 n

## 2020-11-29 NOTE — ED INITIAL ASSESSMENT (HPI)
Pt ambulatory into triage with steady gait. Pt c/o cough, sob, and fevers x3days. No known sick contacts. Pt with congested cough durign assessment and visible SOB. No antipyretics taken today.

## 2020-11-30 LAB
ANION GAP SERPL CALC-SCNC: 7 MMOL/L (ref 0–18)
BASOPHILS # BLD AUTO: 0.01 X10(3) UL (ref 0–0.2)
BASOPHILS NFR BLD AUTO: 0.1 %
BUN BLD-MCNC: 20 MG/DL (ref 7–18)
BUN/CREAT SERPL: 19.4 (ref 10–20)
CALCIUM BLD-MCNC: 10.1 MG/DL (ref 8.5–10.1)
CHLORIDE SERPL-SCNC: 103 MMOL/L (ref 98–112)
CO2 SERPL-SCNC: 26 MMOL/L (ref 21–32)
CREAT BLD-MCNC: 1.03 MG/DL
CRP SERPL-MCNC: 4.14 MG/DL (ref ?–0.3)
D DIMER PPP FEU-MCNC: 2.01 UG/ML FEU (ref ?–0.78)
DEPRECATED HBV CORE AB SER IA-ACNC: 114.2 NG/ML
DEPRECATED RDW RBC AUTO: 47.2 FL (ref 35.1–46.3)
EOSINOPHIL # BLD AUTO: 0 X10(3) UL (ref 0–0.7)
EOSINOPHIL NFR BLD AUTO: 0 %
ERYTHROCYTE [DISTWIDTH] IN BLOOD BY AUTOMATED COUNT: 14.7 % (ref 11–15)
GLUCOSE BLD-MCNC: 122 MG/DL (ref 70–99)
HCT VFR BLD AUTO: 41 %
HGB BLD-MCNC: 13.1 G/DL
IMM GRANULOCYTES # BLD AUTO: 0.03 X10(3) UL (ref 0–1)
IMM GRANULOCYTES NFR BLD: 0.3 %
LDH SERPL L TO P-CCNC: 297 U/L
LYMPHOCYTES # BLD AUTO: 0.98 X10(3) UL (ref 1–4)
LYMPHOCYTES NFR BLD AUTO: 9.2 %
MCH RBC QN AUTO: 27.6 PG (ref 26–34)
MCHC RBC AUTO-ENTMCNC: 32 G/DL (ref 31–37)
MCV RBC AUTO: 86.5 FL
MONOCYTES # BLD AUTO: 0.22 X10(3) UL (ref 0.1–1)
MONOCYTES NFR BLD AUTO: 2.1 %
NEUTROPHILS # BLD AUTO: 9.43 X10 (3) UL (ref 1.5–7.7)
NEUTROPHILS # BLD AUTO: 9.43 X10(3) UL (ref 1.5–7.7)
NEUTROPHILS NFR BLD AUTO: 88.3 %
OSMOLALITY SERPL CALC.SUM OF ELEC: 286 MOSM/KG (ref 275–295)
PLATELET # BLD AUTO: 203 10(3)UL (ref 150–450)
POTASSIUM SERPL-SCNC: 4.6 MMOL/L (ref 3.5–5.1)
RBC # BLD AUTO: 4.74 X10(6)UL
SODIUM SERPL-SCNC: 136 MMOL/L (ref 136–145)
WBC # BLD AUTO: 10.7 X10(3) UL (ref 4–11)

## 2020-11-30 PROCEDURE — 99233 SBSQ HOSP IP/OBS HIGH 50: CPT | Performed by: INTERNAL MEDICINE

## 2020-11-30 PROCEDURE — 99214 OFFICE O/P EST MOD 30 MIN: CPT | Performed by: NURSE PRACTITIONER

## 2020-11-30 RX ORDER — METOPROLOL SUCCINATE 50 MG/1
50 TABLET, EXTENDED RELEASE ORAL
Status: DISCONTINUED | OUTPATIENT
Start: 2020-11-30 | End: 2020-12-03

## 2020-11-30 RX ORDER — HYDROCODONE POLISTIREX AND CHLORPHENIRAMINE POLISTIREX 10; 8 MG/5ML; MG/5ML
5 SUSPENSION, EXTENDED RELEASE ORAL NIGHTLY PRN
Status: DISCONTINUED | OUTPATIENT
Start: 2020-11-30 | End: 2020-12-01

## 2020-11-30 RX ORDER — METOPROLOL SUCCINATE 25 MG/1
25 TABLET, EXTENDED RELEASE ORAL
Status: DISCONTINUED | OUTPATIENT
Start: 2020-11-30 | End: 2020-12-03

## 2020-11-30 RX ORDER — METOPROLOL SUCCINATE 25 MG/1
25 TABLET, EXTENDED RELEASE ORAL
Status: DISCONTINUED | OUTPATIENT
Start: 2020-12-01 | End: 2020-11-30

## 2020-11-30 RX ORDER — METOPROLOL SUCCINATE 50 MG/1
50 TABLET, EXTENDED RELEASE ORAL
Status: DISCONTINUED | OUTPATIENT
Start: 2020-12-01 | End: 2020-11-30

## 2020-11-30 NOTE — CM/SW NOTE
I called patient's pharmacy below to inquire about her copay for [facility] prescription:    Fabrice  #[id number] - Cherelle Fallon, [address], [facility] [address] at patient's pharmacy told me that the patient'

## 2020-11-30 NOTE — PLAN OF CARE
Problem: RESPIRATORY - ADULT  Goal: Achieves optimal ventilation and oxygenation  Description: INTERVENTIONS:  - Assess for changes in respiratory status  - Assess for changes in mentation and behavior  - Position to facilitate oxygenation and minimize res risk of falls.   - [place] fall precautions as indicated by assessment.  - Educate pt/family on patient safety including physical limitations  - Instruct pt to call for assistance with activity based on assessment  - Modify environment to reduce risk of i

## 2020-11-30 NOTE — PROGRESS NOTES
[facility]    Progress Note    Crystal Gonzalez Patient Status:  Inpatient    1940 MRN [id number]   Location [facility] 3W/SW Attending Shanon Henderson MD   [facility] Day # 1 PCP Quang Hernández.  Bob Haas MD        Subjective:     Carmen Carvajal rhythm. Edema not present. Pulmonary/Chest: No accessory muscle usage or stridor. Tachypnea noted. No apnea and no bradypnea. She is not intubated. No respiratory distress. She has decreased breath sounds. She has wheezes. She has no rhonchi.  She has patient in room with patient. DVT prophylaxis on heparin subcu. GI prophylaxis on PPI especially with being on methylprednisolone.         Results:     Lab Results   Component Value Date    WBC 10.7 11/30/2020    HGB 13.1 11/30/2020    HCT 41.0 11/30/2020 11/28/2020  ECG Report  Interpretation  -------------------------- Sinus Tachycardia -Left bundle branch block.  ABNORMAL When compared with ECG of 06/15/2020 10:33:24 No significant changes have occurred Electronically signed on 11/29/2020 at 18:45 by Miri Lundberg

## 2020-11-30 NOTE — PROGRESS NOTES
Pulmonary/Critical Care Follow Up Note    HPI:   Sameer Brody is a 68year old female with Patient presents with:  Difficulty Breathing  Cough/URI      PCP Ana Mauricio MD  Admission Attending Rigoberto Medina 15 Day #1    Feeling better  Bre Velazquez Sulfate  (90 Base) MCG/ACT inhaler 2 puff, 2 puff, Inhalation, TID  •  Sacubitril-Valsartan (ENTRESTO) 49-51 MG per tab 1 tablet, 1 tablet, Oral, BID **AND** [COMPLETED] Social work, , , Once  •  methylPREDNISolone Sodium Succ (Solu-MEDROL) injectio

## 2020-11-30 NOTE — PAYOR COMM NOTE
--------------  Appropriate for inpatient status per guidelines for cough due to acute bronchitis and CHF on IV Lasix, also with HTN.       ADMISSION REVIEW     ArvinorSpalmer Ruano MA [facility]  Subscriber #:  [id number]  Authorization Number: [id number]       ED Provide °C) (Oral)   Resp 20   Ht 162.6 cm (5' 4\")   Wt 73.4 kg   SpO2 93%   BMI 27.79 kg/m²         Physical Exam    Constitutional: Oriented to person, place, and time. Appears well-developed. No distress. Head: Normocephalic and atraumatic.    Eyes: Conjunct normal limits   CBC W/ DIFFERENTIAL - Abnormal; Notable for the following components:    RDW-SD 47.2 (*)     Neutrophil Absolute Prelim 8.71 (*)     Neutrophil Absolute 8.71 (*)     Lymphocyte Absolute 0.86 (*)     All other components within normal limits unchanged. LUNGS: The lungs are underinflated which accentuates the lung markings. The lungs are otherwise clear. Unremarkable pulmonary vasculature. BONES: No acute destructive process. CONCLUSION:  Underinflation.   Otherwise, no acute cardiopulm coronary artery calcifications. The main pulmonary artery has a normal diameter and is otherwise unremarkable. LUNGS AND PLEURA: There is bibasilar and lingular atelectasis and scarring. No pneumothorax. No consolidation. No pleural effusion. . Decreased Note          Date:  11/29/2020  Date of Admission:  11/28/2020    History provided by:patient  HPI:   Patient presents with:  Difficulty Breathing  Cough/URI        53-year-old who I had spoken with the other day over the phone where she was complaining o normal and breath sounds normal. Musculoskeletal: Normal range of motion. Neurological: She is alert and oriented to person, place, and time.      Cervical Papanicolaou contraindicated    Results:     Lab Results   Component Value Date    WBC 10.5 11/28/2 illness, I anticipate that, after discharge, patient will require TBD.            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

## 2020-11-30 NOTE — PROGRESS NOTES
[facility]    Progress Note    Fabricio Navarrete Patient Status:  Inpatient    1940 MRN [id number]   Location [facility] 3W/SW Attending Myesha Kelly MD   [facility] Day # 1 PCP Belia Hernandez.  Imelda Nevarez MD       Assessment and Plan: mg Oral Nightly   • cholecalciferol  2,000 Units Oral Daily   • escitalopram  10 mg Oral Daily   • Levothyroxine Sodium  125 mcg Oral QAM AC   • Metoprolol Tartrate  50 mg Oral BID   • Pantoprazole Sodium  40 mg Oral QAM AC   • tiZANidine HCl  4 mg Oral Ni report was submitted by the [facility] teleradiologist and there are no significant discrepancies.     Dictated by (CST): Ofelia Paz MD on 11/29/2020 at 6:52 AM     Finalized by (CST): Ofelia Paz MD on 11/29/2020 at 7:11 AM          Ekg 12-lead    Result Da

## 2020-11-30 NOTE — CM/SW NOTE
Received MDO for Entresto. SW/CM will need Rx for Entresto 24hrs prior to d/c to fax to pt's pharmacy for benefit coverage. UPDATE: Per D/C RN Miranda Beltran - pt has been taking Entresto prior to admission at [address] [place] [address].  Miranda Beltran confirmed she will call pt's [facility] [place]

## 2020-12-01 ENCOUNTER — APPOINTMENT (OUTPATIENT)
Dept: ULTRASOUND IMAGING | Facility: HOSPITAL | Age: 80
End: 2020-12-01
Attending: INTERNAL MEDICINE
Payer: MEDICARE

## 2020-12-01 LAB
ANION GAP SERPL CALC-SCNC: 7 MMOL/L (ref 0–18)
BASOPHILS # BLD AUTO: 0.02 X10(3) UL (ref 0–0.2)
BASOPHILS NFR BLD AUTO: 0.1 %
BUN BLD-MCNC: 33 MG/DL (ref 7–18)
BUN/CREAT SERPL: 24.8 (ref 10–20)
CALCIUM BLD-MCNC: 9.8 MG/DL (ref 8.5–10.1)
CHLORIDE SERPL-SCNC: 102 MMOL/L (ref 98–112)
CO2 SERPL-SCNC: 27 MMOL/L (ref 21–32)
CREAT BLD-MCNC: 1.33 MG/DL
CRP SERPL-MCNC: 1.34 MG/DL (ref ?–0.3)
D DIMER PPP FEU-MCNC: 1.82 UG/ML FEU (ref ?–0.78)
DEPRECATED HBV CORE AB SER IA-ACNC: 106.5 NG/ML
DEPRECATED RDW RBC AUTO: 46.5 FL (ref 35.1–46.3)
EOSINOPHIL # BLD AUTO: 0 X10(3) UL (ref 0–0.7)
EOSINOPHIL NFR BLD AUTO: 0 %
ERYTHROCYTE [DISTWIDTH] IN BLOOD BY AUTOMATED COUNT: 15 % (ref 11–15)
EST. AVERAGE GLUCOSE BLD GHB EST-MCNC: 126 MG/DL (ref 68–126)
GLUCOSE BLD-MCNC: 145 MG/DL (ref 70–99)
GLUCOSE BLDC GLUCOMTR-MCNC: 106 MG/DL (ref 70–99)
GLUCOSE BLDC GLUCOMTR-MCNC: 129 MG/DL (ref 70–99)
GLUCOSE BLDC GLUCOMTR-MCNC: 132 MG/DL (ref 70–99)
HBA1C MFR BLD HPLC: 6 % (ref ?–5.7)
HCT VFR BLD AUTO: 41.2 %
HGB BLD-MCNC: 13.5 G/DL
IMM GRANULOCYTES # BLD AUTO: 0.1 X10(3) UL (ref 0–1)
IMM GRANULOCYTES NFR BLD: 0.6 %
LDH SERPL L TO P-CCNC: 288 U/L
LYMPHOCYTES # BLD AUTO: 0.98 X10(3) UL (ref 1–4)
LYMPHOCYTES NFR BLD AUTO: 5.6 %
MCH RBC QN AUTO: 28.2 PG (ref 26–34)
MCHC RBC AUTO-ENTMCNC: 32.8 G/DL (ref 31–37)
MCV RBC AUTO: 86.2 FL
MONOCYTES # BLD AUTO: 0.36 X10(3) UL (ref 0.1–1)
MONOCYTES NFR BLD AUTO: 2 %
NEUTROPHILS # BLD AUTO: 16.13 X10 (3) UL (ref 1.5–7.7)
NEUTROPHILS # BLD AUTO: 16.13 X10(3) UL (ref 1.5–7.7)
NEUTROPHILS NFR BLD AUTO: 91.7 %
OSMOLALITY SERPL CALC.SUM OF ELEC: 292 MOSM/KG (ref 275–295)
PLATELET # BLD AUTO: 216 10(3)UL (ref 150–450)
POTASSIUM SERPL-SCNC: 4.2 MMOL/L (ref 3.5–5.1)
RBC # BLD AUTO: 4.78 X10(6)UL
SODIUM SERPL-SCNC: 136 MMOL/L (ref 136–145)
WBC # BLD AUTO: 17.6 X10(3) UL (ref 4–11)

## 2020-12-01 PROCEDURE — 76775 US EXAM ABDO BACK WALL LIM: CPT | Performed by: INTERNAL MEDICINE

## 2020-12-01 PROCEDURE — 99232 SBSQ HOSP IP/OBS MODERATE 35: CPT | Performed by: INTERNAL MEDICINE

## 2020-12-01 PROCEDURE — 93975 VASCULAR STUDY: CPT | Performed by: INTERNAL MEDICINE

## 2020-12-01 PROCEDURE — 99213 OFFICE O/P EST LOW 20 MIN: CPT | Performed by: INTERNAL MEDICINE

## 2020-12-01 PROCEDURE — 99233 SBSQ HOSP IP/OBS HIGH 50: CPT | Performed by: INTERNAL MEDICINE

## 2020-12-01 RX ORDER — ONDANSETRON 2 MG/ML
4 INJECTION INTRAMUSCULAR; INTRAVENOUS EVERY 6 HOURS PRN
Status: DISCONTINUED | OUTPATIENT
Start: 2020-12-01 | End: 2020-12-03

## 2020-12-01 RX ORDER — ALBUTEROL SULFATE 90 UG/1
2 AEROSOL, METERED RESPIRATORY (INHALATION)
Status: DISCONTINUED | OUTPATIENT
Start: 2020-12-01 | End: 2020-12-01

## 2020-12-01 RX ORDER — POLYETHYLENE GLYCOL 3350 17 G/17G
17 POWDER, FOR SOLUTION ORAL DAILY PRN
Status: DISCONTINUED | OUTPATIENT
Start: 2020-12-01 | End: 2020-12-03

## 2020-12-01 RX ORDER — METHYLPREDNISOLONE SODIUM SUCCINATE 40 MG/ML
40 INJECTION, POWDER, LYOPHILIZED, FOR SOLUTION INTRAMUSCULAR; INTRAVENOUS EVERY 12 HOURS
Status: DISCONTINUED | OUTPATIENT
Start: 2020-12-01 | End: 2020-12-02

## 2020-12-01 RX ORDER — DOCUSATE SODIUM 100 MG/1
100 CAPSULE, LIQUID FILLED ORAL 2 TIMES DAILY
Status: DISCONTINUED | OUTPATIENT
Start: 2020-12-01 | End: 2020-12-03

## 2020-12-01 RX ORDER — FLUTICASONE PROPIONATE 50 MCG
1 SPRAY, SUSPENSION (ML) NASAL 2 TIMES DAILY
Status: DISCONTINUED | OUTPATIENT
Start: 2020-12-01 | End: 2020-12-03

## 2020-12-01 RX ORDER — BISACODYL 10 MG
10 SUPPOSITORY, RECTAL RECTAL
Status: DISCONTINUED | OUTPATIENT
Start: 2020-12-01 | End: 2020-12-03

## 2020-12-01 RX ORDER — MONTELUKAST SODIUM 10 MG/1
10 TABLET ORAL NIGHTLY
Status: DISCONTINUED | OUTPATIENT
Start: 2020-12-01 | End: 2020-12-03

## 2020-12-01 RX ORDER — HYDROCODONE POLISTIREX AND CHLORPHENIRAMINE POLISTIREX 10; 8 MG/5ML; MG/5ML
5 SUSPENSION, EXTENDED RELEASE ORAL 2 TIMES DAILY
Status: DISCONTINUED | OUTPATIENT
Start: 2020-12-01 | End: 2020-12-03

## 2020-12-01 RX ORDER — DEXTROSE MONOHYDRATE 25 G/50ML
50 INJECTION, SOLUTION INTRAVENOUS
Status: DISCONTINUED | OUTPATIENT
Start: 2020-12-01 | End: 2020-12-03

## 2020-12-01 RX ORDER — ALBUTEROL SULFATE 90 UG/1
2 AEROSOL, METERED RESPIRATORY (INHALATION) 3 TIMES DAILY
Status: DISCONTINUED | OUTPATIENT
Start: 2020-12-01 | End: 2020-12-03

## 2020-12-01 NOTE — PLAN OF CARE
Alert, fatigued in the morning and energy improved throughout day. IV solu-medrol and inhaler given as ordered. Tessalon pearls prn. Ambulates in room without difficulty. Daughter at bedside for a few hours today.  Patient reports having a poor appetite but including cough, deep breathe, Incentive Spirometry  - Assess the need for suctioning and perform as needed  - Assess and instruct to report SOB or any respiratory difficulty  - Respiratory Therapy support as indicated  - Manage/alleviate anxiety  - [place] Progressing     Problem: DISCHARGE PLANNING  Goal: Discharge to home or other facility with appropriate resources  Description: INTERVENTIONS:  - Identify barriers to discharge w/pt and caregiver  - Include patient/family/discharge partner in discharge alonzo

## 2020-12-01 NOTE — PROGRESS NOTES
[facility]    Progress Note    Sonia Arrieta Patient Status:  Inpatient    1940 MRN [id number]   Location [facility] 3W/SW Attending Funmi Daniels MD   [facility] Day # 2 PCP Hernan Hartley.  Corbin Rduolph MD        Subjective:     Deisy Gama 83 (H) 12/31/2019    CRP 1.34 (H) 12/01/2020    MG 2.0 02/06/2020    TROP <0.045 11/28/2020     (H) 02/10/2020    B12 256 08/06/2020       No results found. Nicole Quinteros.  Erin Marques MD  12/1/2020

## 2020-12-01 NOTE — PAYOR COMM NOTE
--------------  CONTINUED STAY REVIEW    Cecy Hannah MA [facility]  Subscriber #:  [id number]  Authorization Number: [id number]          11/30 INT MED     Acute respiratory distress  Rapid Covid negative. Covid by PCR negative on 11/29/2020.   Respiratory panel s CARDS      Dilated cardiomyopathy [facility])  Patient has a nonischemic cardiomyopathy with an ICD. I believe majority of her symptoms on this admission were pulmonary and she has responded to pulmonary treatment.   Did receive diuretics but not much of a diure Mason Perez RN      Hydrocod Polst-CPM Polst ER (TUSSIONEX) 10-8 MG/5ML liquid 5 mL     Date Action Dose Route User    12/1/2020 0001 Given 5 mL Oral Gael Michelle RN      Levothyroxine Sodium tab 125 mcg     Date Action Dose Route User    12/1/2020

## 2020-12-01 NOTE — TELEPHONE ENCOUNTER
I spoke with patient who is complaining of shortness of breath and coughing has been going on for few days patient was told to go to ER which she agreed to patient eventually was admitted with CHF exacerbation and rule out Covid.

## 2020-12-01 NOTE — PROGRESS NOTES
[facility]    Cardiology Progress Note  [facility]    Dee Reyes Patient Status:  Inpatient    1940 MRN [id number]   Location [facility] 3W/SW Attending Treasure Ambrose MD   [facility] Day # 2 PCP Jesi Cruz tablet Oral BID   • MethylPREDNISolone Sodium Succ  60 mg Intravenous Q12H       Continuous Infusions:     Results:     Lab Results   Component Value Date    WBC 10.7 11/30/2020    HGB 13.1 11/30/2020    HCT 41.0 11/30/2020    .0 11/30/2020    CREAT on this admission were pulmonary and she has responded to pulmonary treatment. Did receive diuretics but not much of a diuresis or weight loss    We will start her on a small dose of Lasix for home which she was not on previously.   Is on higher dose of En

## 2020-12-01 NOTE — TELEPHONE ENCOUNTER
Message # [phone]         2020 09:10p   [KIRTP]  To:  From:  FAISAL Holman MD:  Phone#:  ----------------------------------------------------------------------  PT ASAD ROBERT [phone]  1940 RE: PATIENT COUGHING [address] [facility]

## 2020-12-01 NOTE — PROGRESS NOTES
[facility]    Progress Note    Tamica Freitas Patient Status:  Inpatient    1940 MRN [id number]   Location [facility] 3W/SW Attending Benedict Wilson MD   [address] Day # 2 PCP [address].  Gayle Stovall MD        Subjective:     Tonia Shirley breastfeeding. Nursing note and vitals reviewed. Constitutional: She is oriented to person, place, and time. Vital signs are normal. She appears well-developed and well-nourished. She is active. Non-toxic appearance.  She does not have a sickly appear I's and O's. Weight up but negative balance. Questionable accuracy of weight? Continue beta-blocker and Entresto. Acute non intractable tension-type headache  H/O migraine headaches. Similar to prior migraines. On Topamax. CKD stage 2-3.    Uday Moore

## 2020-12-02 LAB
ANION GAP SERPL CALC-SCNC: 6 MMOL/L (ref 0–18)
BASOPHILS # BLD AUTO: 0.01 X10(3) UL (ref 0–0.2)
BASOPHILS NFR BLD AUTO: 0.1 %
BUN BLD-MCNC: 39 MG/DL (ref 7–18)
BUN/CREAT SERPL: 35.1 (ref 10–20)
CALCIUM BLD-MCNC: 10 MG/DL (ref 8.5–10.1)
CHLORIDE SERPL-SCNC: 104 MMOL/L (ref 98–112)
CO2 SERPL-SCNC: 26 MMOL/L (ref 21–32)
CREAT BLD-MCNC: 1.11 MG/DL
CRP SERPL-MCNC: 0.48 MG/DL (ref ?–0.3)
D DIMER PPP FEU-MCNC: 1.55 UG/ML FEU (ref ?–0.78)
DEPRECATED HBV CORE AB SER IA-ACNC: 75.6 NG/ML
DEPRECATED RDW RBC AUTO: 48.5 FL (ref 35.1–46.3)
EOSINOPHIL # BLD AUTO: 0.01 X10(3) UL (ref 0–0.7)
EOSINOPHIL NFR BLD AUTO: 0.1 %
ERYTHROCYTE [DISTWIDTH] IN BLOOD BY AUTOMATED COUNT: 15.1 % (ref 11–15)
GLUCOSE BLD-MCNC: 115 MG/DL (ref 70–99)
GLUCOSE BLDC GLUCOMTR-MCNC: 101 MG/DL (ref 70–99)
GLUCOSE BLDC GLUCOMTR-MCNC: 111 MG/DL (ref 70–99)
GLUCOSE BLDC GLUCOMTR-MCNC: 130 MG/DL (ref 70–99)
GLUCOSE BLDC GLUCOMTR-MCNC: 94 MG/DL (ref 70–99)
HCT VFR BLD AUTO: 40.6 %
HGB BLD-MCNC: 13.1 G/DL
IMM GRANULOCYTES # BLD AUTO: 0.04 X10(3) UL (ref 0–1)
IMM GRANULOCYTES NFR BLD: 0.3 %
LDH SERPL L TO P-CCNC: 283 U/L
LYMPHOCYTES # BLD AUTO: 1.13 X10(3) UL (ref 1–4)
LYMPHOCYTES NFR BLD AUTO: 8.7 %
MCH RBC QN AUTO: 28.5 PG (ref 26–34)
MCHC RBC AUTO-ENTMCNC: 32.3 G/DL (ref 31–37)
MCV RBC AUTO: 88.5 FL
MONOCYTES # BLD AUTO: 0.53 X10(3) UL (ref 0.1–1)
MONOCYTES NFR BLD AUTO: 4.1 %
NEUTROPHILS # BLD AUTO: 11.34 X10 (3) UL (ref 1.5–7.7)
NEUTROPHILS # BLD AUTO: 11.34 X10(3) UL (ref 1.5–7.7)
NEUTROPHILS NFR BLD AUTO: 86.7 %
OSMOLALITY SERPL CALC.SUM OF ELEC: 292 MOSM/KG (ref 275–295)
PLATELET # BLD AUTO: 193 10(3)UL (ref 150–450)
POTASSIUM SERPL-SCNC: 4.5 MMOL/L (ref 3.5–5.1)
RBC # BLD AUTO: 4.59 X10(6)UL
SODIUM SERPL-SCNC: 136 MMOL/L (ref 136–145)
WBC # BLD AUTO: 13.1 X10(3) UL (ref 4–11)

## 2020-12-02 PROCEDURE — 99233 SBSQ HOSP IP/OBS HIGH 50: CPT | Performed by: INTERNAL MEDICINE

## 2020-12-02 PROCEDURE — 99214 OFFICE O/P EST MOD 30 MIN: CPT | Performed by: NURSE PRACTITIONER

## 2020-12-02 PROCEDURE — 99232 SBSQ HOSP IP/OBS MODERATE 35: CPT | Performed by: INTERNAL MEDICINE

## 2020-12-02 NOTE — PLAN OF CARE
Pt is ao x 4, pt is sleeping on room air, pt denies pain for this nurse, vss, will continue to monitor pt for safety    Problem: RESPIRATORY - ADULT  Goal: Achieves optimal ventilation and oxygenation  Description: INTERVENTIONS:  - Assess for changes in r INTERVENTIONS:  - Assess pt frequently for physical needs  - Identify cognitive and physical deficits and behaviors that affect risk of falls.   - [place] fall precautions as indicated by assessment.  - Educate pt/family on patient safety including physic

## 2020-12-02 NOTE — PLAN OF CARE
Levaquin switched from Q24h to Q48h, next dose tomorrow afternoon. RA. Afebrile. Neb treatments PRN. Plan for home tomorrow when medically cleared.      Problem: Patient Centered Care  Goal: Patient preferences are identified and integrated in the patient's difficulty  - Respiratory Therapy support as indicated  - Manage/alleviate anxiety  - Monitor for signs/symptoms of CO2 retention  Outcome: Progressing     Problem: PAIN - ADULT  Goal: Verbalizes/displays adequate comfort level or patient's stated pain goa discharge w/pt and caregiver  - Include patient/family/discharge partner in discharge planning  - Arrange for needed discharge resources and transportation as appropriate  - Identify discharge learning needs (meds, wound care, etc)  - Arrange for interpret

## 2020-12-02 NOTE — PLAN OF CARE
Pt has frequent coughing fits, Tessalon rhiannon given. 1L of O2 placed prn during coughing fits for support. IV steroids. IV abx. Neb treatments PRN. Patient ambulating self in room. US Kidney negative for any obstructions.      Problem: Patient Centered Care as needed  - Assess and instruct to report SOB or any respiratory difficulty  - Respiratory Therapy support as indicated  - Manage/alleviate anxiety  - Monitor for signs/symptoms of CO2 retention  Outcome: Progressing     Problem: PAIN - ADULT  Goal: Jack resources  Description: INTERVENTIONS:  - Identify barriers to discharge w/pt and caregiver  - Include patient/family/discharge partner in discharge planning  - Arrange for needed discharge resources and transportation as appropriate  - Identify discharge

## 2020-12-02 NOTE — PROGRESS NOTES
[facility]    Progress Note    Radha Ricci Patient Status:  Inpatient    1940 MRN [id number]   Location [facility] 3W/SW Attending Zackary Carrel., MD   [facility] Day # 3 PCP Travis Barreto MD        Subjective:   Terra Tadeo 08/06/2020    ADAM 73 04/10/2017    LIP 18 (L) 04/10/2017    GGT 35 09/18/2018    DDIMER 1.82 (H) 12/01/2020    ESRML 83 (H) 12/31/2019    CRP 1.34 (H) 12/01/2020    MG 2.0 02/06/2020    TROP <0.045 11/28/2020     (H) 02/10/2020    B12 256 08/06/2020

## 2020-12-02 NOTE — CM/SW NOTE
Patient failed inpatient criteria. Second level of review completed and supports observation. UR committee in agreement. Discussed with Dr. Chuy Wetzel  who approves observation status. MOON given to the patient and order written.

## 2020-12-02 NOTE — PROGRESS NOTES
[facility]    Progress Note    Milton Kumar Patient Status:  Observation    1940 MRN [id number]   Location [facility] 3W/SW Attending Fernando Muñoz MD   [facility] Day # 3 PCP Mallika Ramirez MD       Assessment and Plan: Nightly   • docusate sodium  100 mg Oral BID   • Albuterol Sulfate HFA  2 puff Inhalation TID   • Hydrocod Polst-CPM Polst ER  5 mL Oral BID   • Fluticasone Propionate  1 spray Each Nare BID   • Metoprolol Succinate ER  50 mg Oral 2x Daily(Beta Blocker)

## 2020-12-02 NOTE — PROGRESS NOTES
[facility]    Progress Note    Maryuri Croona Patient Status:  Inpatient    1940 MRN [id number]   Location [facility] 3W/SW Attending Keyshawn Beck MD   [facility] Day # 3 PCP Andrei Salcedo.  Aliyah Becerril MD        Subjective:     Ava Payne to person, place, and time. She displays normal reflexes. No sensory deficit or motor deficit. Coordination and gait normal.   Skin: Skin is warm. Psychiatric: She has a normal mood and affect.            Assessment and Plan:          Acute respiratory di 18 (L) 04/10/2017    GGT 35 09/18/2018    DDIMER 1.82 (H) 12/01/2020    ESRML 83 (H) 12/31/2019    CRP 1.34 (H) 12/01/2020    MG 2.0 02/06/2020    TROP <0.045 11/28/2020     (H) 02/10/2020    B12 256 08/06/2020       No results found.                A

## 2020-12-02 NOTE — PAYOR COMM NOTE
--------------  CONTINUED STAY REVIEW    Aurelio Lowe MA [facility]  Subscriber #:  [id number]  Authorization Number: [id number]      CONVERSION TO OBS - SURY    STILL IN HOUSE

## 2020-12-03 ENCOUNTER — TELEPHONE (OUTPATIENT)
Dept: INTERNAL MEDICINE CLINIC | Facility: CLINIC | Age: 80
End: 2020-12-03

## 2020-12-03 VITALS
DIASTOLIC BLOOD PRESSURE: 72 MMHG | SYSTOLIC BLOOD PRESSURE: 136 MMHG | TEMPERATURE: 98 F | HEART RATE: 78 BPM | WEIGHT: 160.94 LBS | OXYGEN SATURATION: 95 % | HEIGHT: 64 IN | BODY MASS INDEX: 27.48 KG/M2 | RESPIRATION RATE: 20 BRPM

## 2020-12-03 LAB
ANION GAP SERPL CALC-SCNC: 5 MMOL/L (ref 0–18)
BASOPHILS # BLD AUTO: 0.01 X10(3) UL (ref 0–0.2)
BASOPHILS NFR BLD AUTO: 0.1 %
BUN BLD-MCNC: 32 MG/DL (ref 7–18)
BUN/CREAT SERPL: 29.4 (ref 10–20)
CALCIUM BLD-MCNC: 9 MG/DL (ref 8.5–10.1)
CHLORIDE SERPL-SCNC: 104 MMOL/L (ref 98–112)
CO2 SERPL-SCNC: 29 MMOL/L (ref 21–32)
CREAT BLD-MCNC: 1.09 MG/DL
DEPRECATED RDW RBC AUTO: 48.1 FL (ref 35.1–46.3)
EOSINOPHIL # BLD AUTO: 0.04 X10(3) UL (ref 0–0.7)
EOSINOPHIL NFR BLD AUTO: 0.4 %
ERYTHROCYTE [DISTWIDTH] IN BLOOD BY AUTOMATED COUNT: 15.1 % (ref 11–15)
GLUCOSE BLD-MCNC: 74 MG/DL (ref 70–99)
GLUCOSE BLDC GLUCOMTR-MCNC: 75 MG/DL (ref 70–99)
HCT VFR BLD AUTO: 39.7 %
HCT VFR BLD CALC: 39.7 %
HGB BLD-MCNC: 12.8 G/DL
HGB BLD-MCNC: 12.8 G/DL
IMM GRANULOCYTES # BLD AUTO: 0.03 X10(3) UL (ref 0–1)
IMM GRANULOCYTES NFR BLD: 0.3 %
LYMPHOCYTES # BLD AUTO: 2.61 X10(3) UL (ref 1–4)
LYMPHOCYTES NFR BLD AUTO: 24.7 %
MCH RBC QN AUTO: 28.4 PG (ref 26–34)
MCHC RBC AUTO-ENTMCNC: 32.2 G/DL (ref 31–37)
MCV RBC AUTO: 88 FL
MONOCYTES # BLD AUTO: 0.88 X10(3) UL (ref 0.1–1)
MONOCYTES NFR BLD AUTO: 8.3 %
NEUTROPHILS # BLD AUTO: 6.99 X10 (3) UL (ref 1.5–7.7)
NEUTROPHILS # BLD AUTO: 6.99 X10(3) UL (ref 1.5–7.7)
NEUTROPHILS NFR BLD AUTO: 66.2 %
OSMOLALITY SERPL CALC.SUM OF ELEC: 292 MOSM/KG (ref 275–295)
PLATELET # BLD AUTO: 183 10(3)UL (ref 150–450)
PLATELET # BLD: 183 K/MCL
POTASSIUM SERPL-SCNC: 3.8 MMOL/L (ref 3.5–5.1)
RBC # BLD AUTO: 4.51 X10(6)UL
RBC # BLD: 4.51 10*6/UL
SODIUM SERPL-SCNC: 138 MMOL/L (ref 136–145)
WBC # BLD AUTO: 10.6 X10(3) UL (ref 4–11)
WBC # BLD: 10.6 K/MCL

## 2020-12-03 PROCEDURE — 99232 SBSQ HOSP IP/OBS MODERATE 35: CPT | Performed by: INTERNAL MEDICINE

## 2020-12-03 PROCEDURE — 99217 OBSERVATION CARE DISCHARGE: CPT | Performed by: INTERNAL MEDICINE

## 2020-12-03 RX ORDER — MONTELUKAST SODIUM 10 MG/1
10 TABLET ORAL NIGHTLY
Qty: 7 TABLET | Refills: 0 | Status: SHIPPED | OUTPATIENT
Start: 2020-12-03 | End: 2020-12-30 | Stop reason: ALTCHOICE

## 2020-12-03 RX ORDER — PREDNISONE 20 MG/1
TABLET ORAL
Qty: 9 TABLET | Refills: 0 | Status: SHIPPED | OUTPATIENT
Start: 2020-12-03 | End: 2020-12-12

## 2020-12-03 RX ORDER — HYDROCODONE POLISTIREX AND CHLORPHENIRAMINE POLISTIREX 10; 8 MG/5ML; MG/5ML
5 SUSPENSION, EXTENDED RELEASE ORAL 2 TIMES DAILY
Qty: 70 ML | Refills: 0 | Status: SHIPPED | OUTPATIENT
Start: 2020-12-03 | End: 2020-12-18

## 2020-12-03 RX ORDER — PREDNISONE 10 MG/1
TABLET ORAL
Qty: 18 TABLET | Refills: 0 | Status: SHIPPED | OUTPATIENT
Start: 2020-12-03 | End: 2020-12-18

## 2020-12-03 RX ORDER — ALBUTEROL SULFATE 90 UG/1
2 AEROSOL, METERED RESPIRATORY (INHALATION) 3 TIMES DAILY
Qty: 1 INHALER | Refills: 1 | Status: SHIPPED | OUTPATIENT
Start: 2020-12-03 | End: 2020-12-30 | Stop reason: ALTCHOICE

## 2020-12-03 RX ORDER — FLUTICASONE PROPIONATE 50 MCG
1 SPRAY, SUSPENSION (ML) NASAL 2 TIMES DAILY
Qty: 1 BOTTLE | Refills: 0 | Status: SHIPPED | OUTPATIENT
Start: 2020-12-03

## 2020-12-03 NOTE — PROGRESS NOTES
Pulmonary/Critical Care Follow Up Note    HPI:   Maryuri Corona is a 68year old female with Patient presents with:  Difficulty Breathing  Cough/URI      PCP Ana Becerril MD  Admission Attending Khadra Kinsey [facility] Day #3    Feeling better  Neva Mukherjee XL) 24 hr tab 50 mg, 50 mg, Oral, 2x Daily(Beta Blocker)  •  Metoprolol Succinate ER (Toprol XL) 24 hr tab 25 mg, 25 mg, Oral, Noon  •  allopurinol (ZYLOPRIM) tab 300 mg, 300 mg, Oral, Daily  •  aspirin EC tab 81 mg, 81 mg, Oral, Daily  •  atorvastatin (LI 9.0 12/03/2020    DDIMER 1.55 12/02/2020    CRP 0.48 12/02/2020           ASSESSMENT/PLAN:     Acute bronchitis with significant bronchospasm and respiratory distress  CTA neg PE and no clear PNA  Better today  + rhinovirus  covid neg x 2  PCT borderline +

## 2020-12-03 NOTE — PLAN OF CARE
Patient and her son Romel Donato (at bedside and with patient's consent) were both provided with discharge instructions, education, and follow up information. Prescriptions were already sent electronically to patient's pharmacy.  Patient verbalizes understanding status  - Assess for changes in mentation and behavior  - Position to facilitate oxygenation and minimize respiratory effort  - Oxygen supplementation based on oxygen saturation or ABGs  - Provide Smoking Cessation handout, if applicable  - Encourage bron including physical limitations  - Instruct pt to call for assistance with activity based on assessment  - Modify environment to reduce risk of injury  - Provide assistive devices as appropriate  - Consider OT/PT consult to assist with strengthening/mobilit

## 2020-12-03 NOTE — DISCHARGE SUMMARY
Brisas [address]250 Patient Status:  Observation    1940 MRN [id number]   Location [facility] 3W/SW Attending Myesha Kelly MD   [facility] Day # 3 PCP Belia Hernandez.  Imelda Nevarez MD     Date of Admission: 2020  Date of Discharge: Checked renal U/S and Doppler which showed a tiny left renal calculus no evidence of renal artery stenosis some medical renal disease. [place] urinalysis August 6-20, 2020.     Discussed with patient. Discussed with RN taking care of patient.   GI prophylax daily.    Refills: 0     atorvastatin 40 MG Tabs  Commonly known as: LIPITOR      TAKE 1 TABLET(40 MG) BY MOUTH EVERY NIGHT   Quantity: 90 tablet  Refills: 1     D3 Super Strength 50 MCG (2000 UT) Caps  Generic drug: Cholecalciferol      Take 2,000 Units by 503 [address] [address]    Phone: [phone]   · Albuterol Sulfate  (90 Base) MCG/ACT Aers  · Fluticasone Propionate 50 MCG/ACT Susp  · Hydrocod Polst-CPM Polst ER 10-8 MG/5ML Suer  · Montelukast Sodium 10 MG Tabs  · predniSONE 10 MG Tabs         Disch mouth daily. !! - Potential duplicate medications found. Please discuss with provider. Discharge Diet: Cardiac diet low fat.      Discharge Activity: As tolerated    Follow-up appointment:   Ramírez Weldon.MD Murtaza

## 2020-12-03 NOTE — PLAN OF CARE
Problem: Patient Centered Care  Goal: Patient preferences are identified and integrated in the patient's plan of care  Description: Interventions:  - What would you like us to know as we care for you? Was a PCT here.    - Provide timely, complete, and acc Problem: PAIN - ADULT  Goal: Verbalizes/displays adequate comfort level or patient's stated pain goal  Description: INTERVENTIONS:  - Encourage pt to monitor pain and request assistance  - Assess pain using appropriate pain scale  - Administer analgesics appropriate  - Identify discharge learning needs (meds, wound care, etc)  - Arrange for interpreters to assist at discharge as needed  - Consider post-discharge preferences of patient/family/discharge partner  - Complete POLST form as appropriate  - Assess

## 2020-12-03 NOTE — TELEPHONE ENCOUNTER
Cr from our Bedside  stated pt needs a ER f/u with Dr. Corbin Rudolph. Next opening is March 2021. Can we use your [id number] or double book this patient? Please confirm and we will call pt. Thank you.

## 2020-12-04 ENCOUNTER — TELEPHONE (OUTPATIENT)
Dept: INTERNAL MEDICINE CLINIC | Facility: CLINIC | Age: 80
End: 2020-12-04

## 2020-12-04 ENCOUNTER — PATIENT OUTREACH (OUTPATIENT)
Dept: CASE MANAGEMENT | Age: 80
End: 2020-12-04

## 2020-12-04 DIAGNOSIS — Z02.9 ENCOUNTERS FOR UNSPECIFIED ADMINISTRATIVE PURPOSE: ICD-10-CM

## 2020-12-04 DIAGNOSIS — R06.03 ACUTE RESPIRATORY DISTRESS: ICD-10-CM

## 2020-12-04 PROCEDURE — 1111F DSCHRG MED/CURRENT MED MERGE: CPT

## 2020-12-04 NOTE — TELEPHONE ENCOUNTER
Pt discharged 12-3-20, acute respiratory distress. Pt has HFU scheduled for 12-11-20 with PCP. NCM tried to change the visit type to TCM HFU (#2758) however would not allow due to restriction. Please discuss with PCP and if ok, please change visit type to #6010 TCM HFU, thank you!     BOOK BY DATE (last date for TCM): 12-17-20

## 2020-12-04 NOTE — PROGRESS NOTES
Initial Post Discharge Follow Up   Discharge Date: 12/3/20  Contact Date: 12/4/2020    Consent Verification:  Assessment Completed With: Patient  HIPAA Verified?   Yes    Discharge Dx:  Chronic Obstructive Pulmonary Disease    General:   • How have you b Oral Tab 30 mg X 3 days and then 20 mg X 3 days and then 10 mg X 3 days and then off. 18 tablet 0   • predniSONE 20 MG Oral Tab Take 1.5 tablets (30 mg total) by mouth daily for 3 days, THEN 1 tablet (20 mg total) daily for 3 days, THEN 0.5 tablets (10 mg new medication? No  • Did you  your discharge medications when you left the hospital? Yes  • May I go over your medications with you to make sure we are not missing anything? yes  • Are there any reasons that keep you from taking your medication as p [address] [place] [address] Addymortezai   [phone] [address] [facility] [place], [address], [facility] 215  [address] [facility] [place].  6 Zainab Mccoy [address]  [address] [facility]

## 2020-12-08 ENCOUNTER — PATIENT OUTREACH (OUTPATIENT)
Dept: CASE MANAGEMENT | Age: 80
End: 2020-12-08

## 2020-12-08 ENCOUNTER — OFFICE VISIT (OUTPATIENT)
Dept: CARDIOLOGY CLINIC | Facility: HOSPITAL | Age: 80
End: 2020-12-08
Attending: NURSE PRACTITIONER
Payer: MEDICARE

## 2020-12-08 VITALS
SYSTOLIC BLOOD PRESSURE: 154 MMHG | WEIGHT: 168 LBS | DIASTOLIC BLOOD PRESSURE: 79 MMHG | HEART RATE: 78 BPM | OXYGEN SATURATION: 96 % | BODY MASS INDEX: 29 KG/M2

## 2020-12-08 DIAGNOSIS — I42.8 NONISCHEMIC CARDIOMYOPATHY (HCC): Primary | ICD-10-CM

## 2020-12-08 DIAGNOSIS — R06.03 ACUTE RESPIRATORY DISTRESS: ICD-10-CM

## 2020-12-08 PROBLEM — Z95.0 S/P BIVENTRICULAR CARDIAC PACEMAKER PROCEDURE: Status: ACTIVE | Noted: 2020-12-08

## 2020-12-08 PROCEDURE — 94618 PULMONARY STRESS TESTING: CPT

## 2020-12-08 PROCEDURE — 99214 OFFICE O/P EST MOD 30 MIN: CPT | Performed by: NURSE PRACTITIONER

## 2020-12-08 PROCEDURE — 99212 OFFICE O/P EST SF 10 MIN: CPT | Performed by: NURSE PRACTITIONER

## 2020-12-08 NOTE — PROGRESS NOTES
[address] [facility] Patient Status:  Outpatient    1940 MRN [id number]   Location [address] [place] [address] MD Dr. Viv Gibbons is a 68year old female who presents to clinic after recent vomiting  Hematologic/lymphatic: negative  Musculoskeletal: negative for muscle weakness and myalgias    Objective:  Lab Results   Component Value Date/Time    WBC 10.6 12/03/2020 08:51 AM    HGB 12.8 12/03/2020 08:51 AM    HCT 39.7 12/03/2020 08:51 AM dilated, PAP 40 mmHg    RHC 2/5/20  Findings:  Mean RA 13, PA 62/35, wedge 24, LVEDP 33. Left ventricle, hypertrophied, moderate to severe global hypokinesis, estimated ejection fraction 25 to 30%. At least moderate MR.   Coronaries normal    Us Venous Do 02/04/2020 00:29:31 no change Electronically signed on 02/04/2020 at 15:11 by Noemy Rascon 12-lead   Result Date: 2/4/2020  ECG Report  Interpretation  -------------------------- sinus tachycardia -Left bundle branch block.  ABNORMAL When compared w overload, increased shortness of breath, difficulty breathing when laying down etc.   Call if having any dizziness, lightheadedness, heart racing, palpitations, chest pain, shortness of breath, coughing, swelling, weight gain or worsening symptoms.      32–

## 2020-12-08 NOTE — PROGRESS NOTES
Pt  Called advisting she didn't remember her appointment times             [facility]  [address]  [address]  [phone]  Compa Gutierrez on 12-8-2020 @10:00am         Dr.Maggie Mildred Rico   [address] [place] [address] [place],

## 2020-12-08 NOTE — PATIENT INSTRUCTIONS
Continue current medications    Return to [address] [place] [address] on 12/30/20    Schedule follow up with Dr. Kuldip Monique.   If appointment is within a week of your appointment in the specialty care clinic you can cancel our appointment and follow Dr. Rebeca hogue

## 2020-12-10 PROBLEM — N18.30 CKD (CHRONIC KIDNEY DISEASE) STAGE 3, GFR 30-59 ML/MIN (HCC): Chronic | Status: ACTIVE | Noted: 2020-12-10

## 2020-12-11 ENCOUNTER — OFFICE VISIT (OUTPATIENT)
Dept: INTERNAL MEDICINE CLINIC | Facility: CLINIC | Age: 80
End: 2020-12-11
Payer: MEDICARE

## 2020-12-11 VITALS
RESPIRATION RATE: 19 BRPM | SYSTOLIC BLOOD PRESSURE: 170 MMHG | DIASTOLIC BLOOD PRESSURE: 74 MMHG | OXYGEN SATURATION: 97 % | WEIGHT: 174 LBS | TEMPERATURE: 98 F | HEART RATE: 73 BPM | HEIGHT: 64 IN | BODY MASS INDEX: 29.71 KG/M2

## 2020-12-11 DIAGNOSIS — E53.8 B12 DEFICIENCY: ICD-10-CM

## 2020-12-11 DIAGNOSIS — I50.21 ACUTE SYSTOLIC CHF (CONGESTIVE HEART FAILURE), NYHA CLASS 1 (HCC): Primary | ICD-10-CM

## 2020-12-11 DIAGNOSIS — G47.09 OTHER INSOMNIA: ICD-10-CM

## 2020-12-11 DIAGNOSIS — N17.9 AKI (ACUTE KIDNEY INJURY) (HCC): ICD-10-CM

## 2020-12-11 DIAGNOSIS — R06.03 ACUTE RESPIRATORY DISTRESS: ICD-10-CM

## 2020-12-11 DIAGNOSIS — I10 ESSENTIAL HYPERTENSION: ICD-10-CM

## 2020-12-11 LAB
ALBUMIN SERPL-MCNC: 3 G/DL
ALP SERPL-CCNC: 141 U/L
ALT SERPL-CCNC: 25 UNITS/L
ANION GAP SERPL CALC-SCNC: 6 MMOL/L
AST SERPL-CCNC: 13 UNITS/L
BILIRUB SERPL-MCNC: 0.5 MG/DL
BUN SERPL-MCNC: 15 MG/DL
BUN/CREAT SERPL: 13
CALCIUM SERPL-MCNC: 8.9 MG/DL
CHLORIDE SERPL-SCNC: 104 MMOL/L
CO2 SERPL-SCNC: 32 MMOL/L
CREAT SERPL-MCNC: 1.15 MG/DL
GLOBULIN SER-MCNC: 4.1 G/DL
GLUCOSE SERPL-MCNC: 105 MG/DL
POTASSIUM SERPL-SCNC: 3.3 MMOL/L
PROT SERPL-MCNC: 7.1 G/DL
SODIUM SERPL-SCNC: 142 MMOL/L

## 2020-12-11 PROCEDURE — 3008F BODY MASS INDEX DOCD: CPT | Performed by: INTERNAL MEDICINE

## 2020-12-11 PROCEDURE — 3078F DIAST BP <80 MM HG: CPT | Performed by: INTERNAL MEDICINE

## 2020-12-11 PROCEDURE — 36415 COLL VENOUS BLD VENIPUNCTURE: CPT | Performed by: INTERNAL MEDICINE

## 2020-12-11 PROCEDURE — 3077F SYST BP >= 140 MM HG: CPT | Performed by: INTERNAL MEDICINE

## 2020-12-11 PROCEDURE — 99214 OFFICE O/P EST MOD 30 MIN: CPT | Performed by: INTERNAL MEDICINE

## 2020-12-11 PROCEDURE — 1111F DSCHRG MED/CURRENT MED MERGE: CPT | Performed by: INTERNAL MEDICINE

## 2020-12-11 RX ORDER — TEMAZEPAM 30 MG/1
30 CAPSULE ORAL NIGHTLY PRN
Qty: 30 CAPSULE | Refills: 0 | Status: SHIPPED | OUTPATIENT
Start: 2020-12-11

## 2020-12-11 RX ORDER — CYANOCOBALAMIN 1000 UG/ML
1000 INJECTION INTRAMUSCULAR; SUBCUTANEOUS ONCE
Status: SHIPPED | OUTPATIENT
Start: 2020-12-11

## 2020-12-11 RX ORDER — METOPROLOL SUCCINATE 25 MG/1
25 TABLET, EXTENDED RELEASE ORAL 2 TIMES DAILY
Qty: 90 TABLET | Refills: 1 | Status: SHIPPED | OUTPATIENT
Start: 2020-12-11 | End: 2021-05-20

## 2020-12-11 NOTE — PATIENT INSTRUCTIONS
ASSESSMENT/PLAN:   Acute systolic chf (congestive heart failure), nyha class 1 (hcc)  (primary encounter diagnosis) Stable. B12 deficiency Check blood. B 12 injection today. Acute respiratory distress Improved.      Essential hypertension Not good c

## 2020-12-11 NOTE — PROGRESS NOTES
HPI:    Patient ID: Mariah Height is a 68year old female. See Dr. Megan Cortes NP. Here for F/U from [address] [place] [address]. .. Acute respiratory distress  Rapid Covid negative. Covid by PCR negative on 11/29/2020. Respiratory panel showed positive rhinovirus.   Elevated BNP 12:20 PM    TRIG 180 (H) 08/06/2020 12:20 PM    LDL 65 08/06/2020 12:20 PM    [place] 101 08/06/2020 12:20 PM           Wt Readings from Last 3 Encounters:  12/11/20 : 174 lb (78.9 kg)  12/08/20 : 168 lb (76.2 kg)  12/03/20 : 160 lb 15 oz (73 kg)    BP Haroldine Aus environmental allergies. Neurological: Negative for dizziness, tremors, seizures, syncope, weakness, light-headedness, numbness and headaches. Psychiatric/Behavioral: Positive for sleep disturbance (For yrs.  Has tried mealtonin and ambien and warm milk Oral Tab Take 1 tablet (10 mg total) by mouth daily.  30 tablet 1   • ALLOPURINOL 300 MG Oral Tab TAKE 1 TABLET(300 MG) BY MOUTH EVERY DAY 90 tablet 1   • ATORVASTATIN 40 MG Oral Tab TAKE 1 TABLET(40 MG) BY MOUTH EVERY NIGHT 90 tablet 1   • PANTOPRAZOLE SOD °C) (Temporal)   Resp 19   Ht 5' 4\" (1.626 m)   Wt 174 lb (78.9 kg)   SpO2 97%   BMI 29.87 kg/m²   BP Readings from Last 3 Encounters:  12/11/20 : (!) 170/74  12/08/20 : 154/79  12/03/20 : 136/72    Wt Readings from Last 3 Encounters:  12/11/20 : 174 lb ( injection today. Acute respiratory distress Improved. Essential hypertension Not good control. Increase metoprolol 75 mg every 12 hrs. Call in 1 week with B/P' and heart rate. Careful with diet and excercise at least 30 minutes 3-4 times a week.  Ch The discharge medication list has been reconciled with the patient's current medication list and reviewed by me. See medication list for additions of new medication, and changes to current doses of medications and discontinued medications.       Allergies: MCG/ML injection 1,000 mcg          HISTORY: reconciled and review with patient  She  has a past medical history of Anxiety state, unspecified, Degeneration of lumbar or lumbosacral intervertebral disc (9/18/2018), Gout, IBS (irritable bowel syndrome), Lef Psychiatric/Behavioral: Positive for sleep disturbance (For yrs. Has tried mealtonin and ambien and warm milk and no help. Has tried elavil and worked well. Has also tried restoril and seemed to work well. ).  Negative for suicidal ideas, hallucinations, She has decreased breath sounds. She has no wheezes. She has no rhonchi. She has no rales. She exhibits no tenderness. Abdominal: Soft. There is no abdominal tenderness. Neurological: She is alert and oriented to person, place, and time.    Skin: Skin i seen within 14 days from date of discharge. Belia Hernandez.  Imelda Nevarez MD, 12/11/2020

## 2020-12-18 ENCOUNTER — HOSPITAL ENCOUNTER (EMERGENCY)
Facility: HOSPITAL | Age: 80
Discharge: HOME | End: 2020-12-18
Attending: EMERGENCY MEDICINE
Payer: MEDICARE

## 2020-12-18 ENCOUNTER — TELEPHONE (OUTPATIENT)
Dept: INTERNAL MEDICINE CLINIC | Facility: CLINIC | Age: 80
End: 2020-12-18

## 2020-12-18 VITALS
OXYGEN SATURATION: 99 % | WEIGHT: 172 LBS | BODY MASS INDEX: 29.37 KG/M2 | RESPIRATION RATE: 16 BRPM | DIASTOLIC BLOOD PRESSURE: 79 MMHG | HEIGHT: 64 IN | SYSTOLIC BLOOD PRESSURE: 168 MMHG | TEMPERATURE: 99 F | HEART RATE: 76 BPM

## 2020-12-18 DIAGNOSIS — I10 UNCONTROLLED HYPERTENSION: Primary | ICD-10-CM

## 2020-12-18 PROCEDURE — 93005 ELECTROCARDIOGRAM TRACING: CPT

## 2020-12-18 PROCEDURE — 93010 ELECTROCARDIOGRAM REPORT: CPT | Performed by: EMERGENCY MEDICINE

## 2020-12-18 PROCEDURE — 99283 EMERGENCY DEPT VISIT LOW MDM: CPT

## 2020-12-19 NOTE — ED PROVIDER NOTES
Patient Seen in: [facility] Emergency Department    History   Patient presents with:  Hypertension      HPI    Patient presents to the ED concerned about elevated blood pressure.   She states that she has been checking her blood pressure daily as a Tobacco Use      Smoking status: Never Smoker      Smokeless tobacco: Never Used    Substance and Sexual Activity      Alcohol use: Yes        Comment: occ      Drug use: No    Other Topics      Concerns:         Service: No        Blood Transfusio exhibits no discharge. Left eye exhibits no discharge. Neck: No tracheal deviation present. Cardiovascular: Normal rate and intact distal pulses. Pulmonary/Chest: Effort normal. No stridor. No respiratory distress. Abdominal: Soft.  She exhibits no follow-up for further management. Additional verbal instructions and return precautions were discussed with the patient and/or caregiver.       Condition upon leaving the department: Stable    Disposition and Plan     Clinical Impression:  Uncontrolled h

## 2020-12-19 NOTE — ED NOTES
Patient discharged home in no acute distress in care of son. A&Ox4, skin p/w/d, denies cp/sob. Ambulating with steady gait and verbalized understanding of d/c instructions.

## 2020-12-19 NOTE — ED INITIAL ASSESSMENT (HPI)
Pt checked her blood pressure at home and found it to be elevated. Pt is feeling dizzy. Denies vision changes. Pt states she has been taking her blood pressure medications as prescribed.

## 2020-12-22 RX ORDER — SACUBITRIL AND VALSARTAN 24; 26 MG/1; MG/1
TABLET, FILM COATED ORAL
Qty: 60 TABLET | Refills: 3 | OUTPATIENT
Start: 2020-12-22

## 2020-12-22 NOTE — TELEPHONE ENCOUNTER
This patient not seen at [facility], [facility]. Patient seen at [facility]. Refill denied and message sent to pharmacy.

## 2020-12-23 ENCOUNTER — TELEPHONE (OUTPATIENT)
Dept: CARDIOLOGY | Age: 80
End: 2020-12-23

## 2020-12-23 RX ORDER — SACUBITRIL AND VALSARTAN 24; 26 MG/1; MG/1
1 TABLET, FILM COATED ORAL 2 TIMES DAILY
Qty: 180 TABLET | Refills: 1 | Status: CANCELLED | OUTPATIENT
Start: 2020-12-23

## 2020-12-23 RX ORDER — SACUBITRIL AND VALSARTAN 24; 26 MG/1; MG/1
1 TABLET, FILM COATED ORAL 2 TIMES DAILY
Qty: 180 TABLET | Refills: 0 | Status: SHIPPED | OUTPATIENT
Start: 2020-12-23 | End: 2021-01-04 | Stop reason: DRUGHIGH

## 2020-12-28 NOTE — TELEPHONE ENCOUNTER
Message # [phone]         2020 07:49p   [[email or web address]]  To:  From:  FAISAL Holman MD:  Phone#:  ----------------------------------------------------------------------  Kris Huang [phone],  44, RE; PT BLOOD PRESSURE /100, PLEASE ADIVISE

## 2020-12-30 ENCOUNTER — VIRTUAL PHONE E/M (OUTPATIENT)
Dept: CARDIOLOGY CLINIC | Facility: HOSPITAL | Age: 80
End: 2020-12-30
Attending: NURSE PRACTITIONER
Payer: MEDICARE

## 2020-12-30 DIAGNOSIS — I50.21 ACUTE SYSTOLIC CHF (CONGESTIVE HEART FAILURE), NYHA CLASS 1 (HCC): ICD-10-CM

## 2020-12-30 DIAGNOSIS — I10 ESSENTIAL HYPERTENSION: Primary | ICD-10-CM

## 2020-12-30 PROCEDURE — 99442 PR TELEPHONE EVAL AND MGNT EST PATIENT 11-20 MIN MEDICAL DISCUSSION: CPT | Performed by: NURSE PRACTITIONER

## 2020-12-30 PROCEDURE — 93296 REM INTERROG EVL PM/IDS: CPT | Performed by: INTERNAL MEDICINE

## 2020-12-30 NOTE — PROGRESS NOTES
[facility] Patient Status:  Outpatient    1940 MRN [id number]   Location [address] [place] [address] MD Dr. Vita Reddy is a 68year old female who presents for telephone visit af fever  Respiratory: negative for cough,  negative hemoptysis and wheezing  Cardiovascular: negative for chest pain, exertional chest pressure/discomfort, near-syncope, orthopnea and palpitations  Gastrointestinal: negative for abdominal pain, diarrhea, anjum moderate to severe global hypokinesis, estimated ejection fraction 25 to 30%. At least moderate MR. Coronaries normal    Us Venous Doppler Leg Bilat - Diag Img (cpt=93970)   Result Date: 2/4/2020  CONCLUSION: Normal examination.      Dictated by (CST): [facility] 2/4/2020  ECG Report  Interpretation  -------------------------- sinus tachycardia -Left bundle branch block.  ABNORMAL When compared with ECG of 08/31/2015 09:58:45 tachycardia new Electronically signed on 02/04/2020 at 10:25 by Kel Oneil    Education: dinners, soups (not homemade), some cereal, vegetable juice, canned vegetables, lunch meats, processed meats like hotdogs, sausage, sampson, pepperoni, soy sauce, pre-packaged rice or potatoes. Please remember to read nutrition labels for sodium content.

## 2020-12-31 RX ORDER — PANTOPRAZOLE SODIUM 40 MG/1
TABLET, DELAYED RELEASE ORAL
Qty: 90 TABLET | Refills: 1 | Status: SHIPPED | OUTPATIENT
Start: 2020-12-31 | End: 2021-04-29

## 2021-01-04 ENCOUNTER — OFFICE VISIT (OUTPATIENT)
Dept: CARDIOLOGY | Age: 81
End: 2021-01-04

## 2021-01-04 VITALS
RESPIRATION RATE: 18 BRPM | BODY MASS INDEX: 29.71 KG/M2 | HEART RATE: 85 BPM | DIASTOLIC BLOOD PRESSURE: 76 MMHG | HEIGHT: 64 IN | WEIGHT: 174 LBS | SYSTOLIC BLOOD PRESSURE: 170 MMHG | OXYGEN SATURATION: 91 %

## 2021-01-04 DIAGNOSIS — I42.0 DILATED CARDIOMYOPATHY (CMD): Primary | ICD-10-CM

## 2021-01-04 DIAGNOSIS — I10 ESSENTIAL HYPERTENSION: ICD-10-CM

## 2021-01-04 DIAGNOSIS — I44.7 LBBB (LEFT BUNDLE BRANCH BLOCK): ICD-10-CM

## 2021-01-04 PROCEDURE — 99214 OFFICE O/P EST MOD 30 MIN: CPT | Performed by: INTERNAL MEDICINE

## 2021-01-04 PROCEDURE — 3078F DIAST BP <80 MM HG: CPT | Performed by: INTERNAL MEDICINE

## 2021-01-04 PROCEDURE — 3077F SYST BP >= 140 MM HG: CPT | Performed by: INTERNAL MEDICINE

## 2021-01-04 RX ORDER — SACUBITRIL AND VALSARTAN 49; 51 MG/1; MG/1
1 TABLET, FILM COATED ORAL 2 TIMES DAILY
Qty: 60 TABLET | Refills: 11 | Status: SHIPPED | OUTPATIENT
Start: 2021-01-04

## 2021-01-04 ASSESSMENT — PATIENT HEALTH QUESTIONNAIRE - PHQ9
SUM OF ALL RESPONSES TO PHQ9 QUESTIONS 1 AND 2: 0
CLINICAL INTERPRETATION OF PHQ9 SCORE: NO FURTHER SCREENING NEEDED
SUM OF ALL RESPONSES TO PHQ9 QUESTIONS 1 AND 2: 0
CLINICAL INTERPRETATION OF PHQ2 SCORE: NO FURTHER SCREENING NEEDED
1. LITTLE INTEREST OR PLEASURE IN DOING THINGS: NOT AT ALL
2. FEELING DOWN, DEPRESSED OR HOPELESS: NOT AT ALL

## 2021-01-14 RX ORDER — FLUTICASONE PROPIONATE 50 MCG
1 SPRAY, SUSPENSION (ML) NASAL
COMMUNITY
Start: 2020-12-03 | End: 2021-01-26

## 2021-01-14 RX ORDER — TEMAZEPAM 30 MG/1
30 CAPSULE ORAL
COMMUNITY
Start: 2020-12-11 | End: 2021-01-26

## 2021-01-26 ENCOUNTER — ANCILLARY PROCEDURE (OUTPATIENT)
Dept: CARDIOLOGY | Age: 81
End: 2021-01-26
Attending: INTERNAL MEDICINE

## 2021-01-26 ENCOUNTER — OFFICE VISIT (OUTPATIENT)
Dept: CARDIOLOGY | Age: 81
End: 2021-01-26

## 2021-01-26 VITALS
OXYGEN SATURATION: 97 % | HEART RATE: 81 BPM | WEIGHT: 169 LBS | HEIGHT: 64 IN | DIASTOLIC BLOOD PRESSURE: 82 MMHG | BODY MASS INDEX: 28.85 KG/M2 | SYSTOLIC BLOOD PRESSURE: 158 MMHG

## 2021-01-26 DIAGNOSIS — I44.7 LBBB (LEFT BUNDLE BRANCH BLOCK): ICD-10-CM

## 2021-01-26 DIAGNOSIS — Z45.02 IMPLANTABLE DEFIBRILLATOR REPROGRAMMING/CHECK: ICD-10-CM

## 2021-01-26 DIAGNOSIS — I50.22 CHRONIC SYSTOLIC CONGESTIVE HEART FAILURE (CMD): Primary | ICD-10-CM

## 2021-01-26 DIAGNOSIS — I10 ESSENTIAL HYPERTENSION: ICD-10-CM

## 2021-01-26 DIAGNOSIS — Z45.02 ICD (IMPLANTABLE CARDIOVERTER-DEFIBRILLATOR) DISCHARGE: ICD-10-CM

## 2021-01-26 PROCEDURE — 3077F SYST BP >= 140 MM HG: CPT | Performed by: INTERNAL MEDICINE

## 2021-01-26 PROCEDURE — 99214 OFFICE O/P EST MOD 30 MIN: CPT | Performed by: INTERNAL MEDICINE

## 2021-01-26 PROCEDURE — 93284 PRGRMG EVAL IMPLANTABLE DFB: CPT | Performed by: INTERNAL MEDICINE

## 2021-01-26 PROCEDURE — 3079F DIAST BP 80-89 MM HG: CPT | Performed by: INTERNAL MEDICINE

## 2021-01-26 PROCEDURE — 93290 INTERROG DEV EVAL ICPMS IP: CPT | Performed by: INTERNAL MEDICINE

## 2021-01-26 ASSESSMENT — PATIENT HEALTH QUESTIONNAIRE - PHQ9
CLINICAL INTERPRETATION OF PHQ2 SCORE: NO FURTHER SCREENING NEEDED
CLINICAL INTERPRETATION OF PHQ9 SCORE: NO FURTHER SCREENING NEEDED
1. LITTLE INTEREST OR PLEASURE IN DOING THINGS: NOT AT ALL
SUM OF ALL RESPONSES TO PHQ9 QUESTIONS 1 AND 2: 0
2. FEELING DOWN, DEPRESSED OR HOPELESS: NOT AT ALL
SUM OF ALL RESPONSES TO PHQ9 QUESTIONS 1 AND 2: 0

## 2021-01-29 RX ORDER — ALLOPURINOL 300 MG/1
TABLET ORAL
Qty: 90 TABLET | Refills: 1 | Status: SHIPPED | OUTPATIENT
Start: 2021-01-29 | End: 2021-07-28

## 2021-01-29 RX ORDER — LEVOTHYROXINE SODIUM 0.12 MG/1
TABLET ORAL
Qty: 90 TABLET | Refills: 1 | Status: SHIPPED | OUTPATIENT
Start: 2021-01-29 | End: 2021-07-28

## 2021-01-29 RX ORDER — ATORVASTATIN CALCIUM 40 MG/1
TABLET, FILM COATED ORAL
Qty: 90 TABLET | Refills: 1 | Status: SHIPPED | OUTPATIENT
Start: 2021-01-29 | End: 2021-07-28

## 2021-02-01 DIAGNOSIS — Z23 NEED FOR VACCINATION: ICD-10-CM

## 2021-03-07 ENCOUNTER — HOSPITAL ENCOUNTER (EMERGENCY)
Facility: HOSPITAL | Age: 81
Discharge: HOME | End: 2021-03-07
Attending: EMERGENCY MEDICINE
Payer: MEDICARE

## 2021-03-07 ENCOUNTER — APPOINTMENT (OUTPATIENT)
Dept: CT IMAGING | Facility: HOSPITAL | Age: 81
End: 2021-03-07
Attending: EMERGENCY MEDICINE
Payer: MEDICARE

## 2021-03-07 ENCOUNTER — TELEPHONE (OUTPATIENT)
Dept: INTERNAL MEDICINE CLINIC | Facility: CLINIC | Age: 81
End: 2021-03-07

## 2021-03-07 VITALS
SYSTOLIC BLOOD PRESSURE: 176 MMHG | DIASTOLIC BLOOD PRESSURE: 70 MMHG | RESPIRATION RATE: 19 BRPM | TEMPERATURE: 98 F | HEART RATE: 88 BPM | OXYGEN SATURATION: 98 %

## 2021-03-07 DIAGNOSIS — R42 DIZZINESS: ICD-10-CM

## 2021-03-07 DIAGNOSIS — R31.9 URINARY TRACT INFECTION WITH HEMATURIA, SITE UNSPECIFIED: Primary | ICD-10-CM

## 2021-03-07 DIAGNOSIS — N39.0 URINARY TRACT INFECTION WITH HEMATURIA, SITE UNSPECIFIED: Primary | ICD-10-CM

## 2021-03-07 LAB
ANION GAP SERPL CALC-SCNC: 5 MMOL/L (ref 0–18)
BASOPHILS # BLD AUTO: 0.02 X10(3) UL (ref 0–0.2)
BASOPHILS NFR BLD AUTO: 0.3 %
BILIRUB UR QL: NEGATIVE
BUN BLD-MCNC: 15 MG/DL (ref 7–18)
BUN/CREAT SERPL: 11.7 (ref 10–20)
CALCIUM BLD-MCNC: 9 MG/DL (ref 8.5–10.1)
CHLORIDE SERPL-SCNC: 106 MMOL/L (ref 98–112)
CO2 SERPL-SCNC: 30 MMOL/L (ref 21–32)
COLOR UR: YELLOW
CREAT BLD-MCNC: 1.28 MG/DL
DEPRECATED RDW RBC AUTO: 46.1 FL (ref 35.1–46.3)
EOSINOPHIL # BLD AUTO: 0.18 X10(3) UL (ref 0–0.7)
EOSINOPHIL NFR BLD AUTO: 2.3 %
ERYTHROCYTE [DISTWIDTH] IN BLOOD BY AUTOMATED COUNT: 14.3 % (ref 11–15)
GLUCOSE BLD-MCNC: 102 MG/DL (ref 70–99)
GLUCOSE UR-MCNC: NEGATIVE MG/DL
HCT VFR BLD AUTO: 42 %
HGB BLD-MCNC: 13.6 G/DL
HGB UR QL STRIP.AUTO: NEGATIVE
HYALINE CASTS #/AREA URNS AUTO: PRESENT /LPF
IMM GRANULOCYTES # BLD AUTO: 0.01 X10(3) UL (ref 0–1)
IMM GRANULOCYTES NFR BLD: 0.1 %
KETONES UR-MCNC: NEGATIVE MG/DL
LYMPHOCYTES # BLD AUTO: 1.87 X10(3) UL (ref 1–4)
LYMPHOCYTES NFR BLD AUTO: 24 %
MCH RBC QN AUTO: 28.3 PG (ref 26–34)
MCHC RBC AUTO-ENTMCNC: 32.4 G/DL (ref 31–37)
MCV RBC AUTO: 87.5 FL
MONOCYTES # BLD AUTO: 0.54 X10(3) UL (ref 0.1–1)
MONOCYTES NFR BLD AUTO: 6.9 %
NEUTROPHILS # BLD AUTO: 5.18 X10 (3) UL (ref 1.5–7.7)
NEUTROPHILS # BLD AUTO: 5.18 X10(3) UL (ref 1.5–7.7)
NEUTROPHILS NFR BLD AUTO: 66.4 %
NITRITE UR QL STRIP.AUTO: NEGATIVE
OSMOLALITY SERPL CALC.SUM OF ELEC: 293 MOSM/KG (ref 275–295)
PH UR: 6 [PH] (ref 5–8)
PLATELET # BLD AUTO: 199 10(3)UL (ref 150–450)
POTASSIUM SERPL-SCNC: 3.7 MMOL/L (ref 3.5–5.1)
PROT UR-MCNC: 30 MG/DL
RBC # BLD AUTO: 4.8 X10(6)UL
SODIUM SERPL-SCNC: 141 MMOL/L (ref 136–145)
SP GR UR STRIP: 1.03 (ref 1–1.03)
TROPONIN I SERPL-MCNC: <0.045 NG/ML (ref ?–0.04)
UROBILINOGEN UR STRIP-ACNC: 2
WBC # BLD AUTO: 7.8 X10(3) UL (ref 4–11)

## 2021-03-07 PROCEDURE — 85025 COMPLETE CBC W/AUTO DIFF WBC: CPT | Performed by: EMERGENCY MEDICINE

## 2021-03-07 PROCEDURE — 96365 THER/PROPH/DIAG IV INF INIT: CPT

## 2021-03-07 PROCEDURE — 93010 ELECTROCARDIOGRAM REPORT: CPT | Performed by: EMERGENCY MEDICINE

## 2021-03-07 PROCEDURE — 93005 ELECTROCARDIOGRAM TRACING: CPT

## 2021-03-07 PROCEDURE — 96375 TX/PRO/DX INJ NEW DRUG ADDON: CPT

## 2021-03-07 PROCEDURE — 81001 URINALYSIS AUTO W/SCOPE: CPT | Performed by: EMERGENCY MEDICINE

## 2021-03-07 PROCEDURE — 87077 CULTURE AEROBIC IDENTIFY: CPT | Performed by: EMERGENCY MEDICINE

## 2021-03-07 PROCEDURE — 84484 ASSAY OF TROPONIN QUANT: CPT | Performed by: EMERGENCY MEDICINE

## 2021-03-07 PROCEDURE — 80048 BASIC METABOLIC PNL TOTAL CA: CPT | Performed by: EMERGENCY MEDICINE

## 2021-03-07 PROCEDURE — 87186 SC STD MICRODIL/AGAR DIL: CPT | Performed by: EMERGENCY MEDICINE

## 2021-03-07 PROCEDURE — 87086 URINE CULTURE/COLONY COUNT: CPT | Performed by: EMERGENCY MEDICINE

## 2021-03-07 PROCEDURE — 99284 EMERGENCY DEPT VISIT MOD MDM: CPT

## 2021-03-07 PROCEDURE — 70450 CT HEAD/BRAIN W/O DYE: CPT | Performed by: EMERGENCY MEDICINE

## 2021-03-07 RX ORDER — CEFADROXIL 500 MG/1
500 CAPSULE ORAL 2 TIMES DAILY
Qty: 14 CAPSULE | Refills: 0 | Status: SHIPPED | OUTPATIENT
Start: 2021-03-07 | End: 2021-03-14

## 2021-03-07 RX ORDER — LORAZEPAM 2 MG/ML
0.5 INJECTION INTRAMUSCULAR ONCE
Status: COMPLETED | OUTPATIENT
Start: 2021-03-07 | End: 2021-03-07

## 2021-03-07 RX ORDER — KETOROLAC TROMETHAMINE 15 MG/ML
15 INJECTION, SOLUTION INTRAMUSCULAR; INTRAVENOUS ONCE
Status: COMPLETED | OUTPATIENT
Start: 2021-03-07 | End: 2021-03-07

## 2021-03-08 ENCOUNTER — HOSPITAL ENCOUNTER (EMERGENCY)
Facility: HOSPITAL | Age: 81
Discharge: HOME | End: 2021-03-08
Attending: EMERGENCY MEDICINE
Payer: MEDICARE

## 2021-03-08 ENCOUNTER — TELEPHONE (OUTPATIENT)
Dept: INTERNAL MEDICINE CLINIC | Facility: CLINIC | Age: 81
End: 2021-03-08

## 2021-03-08 ENCOUNTER — APPOINTMENT (OUTPATIENT)
Dept: CT IMAGING | Facility: HOSPITAL | Age: 81
End: 2021-03-08
Attending: EMERGENCY MEDICINE
Payer: MEDICARE

## 2021-03-08 VITALS
OXYGEN SATURATION: 95 % | DIASTOLIC BLOOD PRESSURE: 78 MMHG | TEMPERATURE: 98 F | SYSTOLIC BLOOD PRESSURE: 148 MMHG | BODY MASS INDEX: 27.66 KG/M2 | RESPIRATION RATE: 21 BRPM | WEIGHT: 162 LBS | HEART RATE: 93 BPM | HEIGHT: 64 IN

## 2021-03-08 DIAGNOSIS — R51.9 ACUTE NONINTRACTABLE HEADACHE, UNSPECIFIED HEADACHE TYPE: Primary | ICD-10-CM

## 2021-03-08 DIAGNOSIS — R42 DIZZINESS: ICD-10-CM

## 2021-03-08 LAB
ANION GAP SERPL CALC-SCNC: 6 MMOL/L (ref 0–18)
BASOPHILS # BLD AUTO: 0.01 X10(3) UL (ref 0–0.2)
BASOPHILS NFR BLD AUTO: 0.1 %
BUN BLD-MCNC: 17 MG/DL (ref 7–18)
BUN/CREAT SERPL: 16.3 (ref 10–20)
CALCIUM BLD-MCNC: 9.2 MG/DL (ref 8.5–10.1)
CHLORIDE SERPL-SCNC: 105 MMOL/L (ref 98–112)
CO2 SERPL-SCNC: 29 MMOL/L (ref 21–32)
CREAT BLD-MCNC: 1.04 MG/DL
CRP SERPL-MCNC: 0.42 MG/DL (ref ?–0.3)
DEPRECATED RDW RBC AUTO: 45.9 FL (ref 35.1–46.3)
EOSINOPHIL # BLD AUTO: 0.17 X10(3) UL (ref 0–0.7)
EOSINOPHIL NFR BLD AUTO: 2.1 %
ERYTHROCYTE [DISTWIDTH] IN BLOOD BY AUTOMATED COUNT: 14.3 % (ref 11–15)
ERYTHROCYTE [SEDIMENTATION RATE] IN BLOOD: 39 MM/HR
GLUCOSE BLD-MCNC: 102 MG/DL (ref 70–99)
HAV IGM SER QL: 1.9 MG/DL (ref 1.6–2.6)
HCT VFR BLD AUTO: 40.6 %
HGB BLD-MCNC: 13 G/DL
IMM GRANULOCYTES # BLD AUTO: 0.01 X10(3) UL (ref 0–1)
IMM GRANULOCYTES NFR BLD: 0.1 %
LYMPHOCYTES # BLD AUTO: 1.43 X10(3) UL (ref 1–4)
LYMPHOCYTES NFR BLD AUTO: 17.4 %
MCH RBC QN AUTO: 28.3 PG (ref 26–34)
MCHC RBC AUTO-ENTMCNC: 32 G/DL (ref 31–37)
MCV RBC AUTO: 88.3 FL
MONOCYTES # BLD AUTO: 0.49 X10(3) UL (ref 0.1–1)
MONOCYTES NFR BLD AUTO: 6 %
NEUTROPHILS # BLD AUTO: 6.1 X10 (3) UL (ref 1.5–7.7)
NEUTROPHILS # BLD AUTO: 6.1 X10(3) UL (ref 1.5–7.7)
NEUTROPHILS NFR BLD AUTO: 74.3 %
OSMOLALITY SERPL CALC.SUM OF ELEC: 292 MOSM/KG (ref 275–295)
PLATELET # BLD AUTO: 196 10(3)UL (ref 150–450)
POTASSIUM SERPL-SCNC: 3.5 MMOL/L (ref 3.5–5.1)
RBC # BLD AUTO: 4.6 X10(6)UL
SODIUM SERPL-SCNC: 140 MMOL/L (ref 136–145)
WBC # BLD AUTO: 8.2 X10(3) UL (ref 4–11)

## 2021-03-08 PROCEDURE — 96376 TX/PRO/DX INJ SAME DRUG ADON: CPT

## 2021-03-08 PROCEDURE — 85652 RBC SED RATE AUTOMATED: CPT | Performed by: EMERGENCY MEDICINE

## 2021-03-08 PROCEDURE — 96361 HYDRATE IV INFUSION ADD-ON: CPT

## 2021-03-08 PROCEDURE — 85025 COMPLETE CBC W/AUTO DIFF WBC: CPT | Performed by: EMERGENCY MEDICINE

## 2021-03-08 PROCEDURE — 70496 CT ANGIOGRAPHY HEAD: CPT | Performed by: EMERGENCY MEDICINE

## 2021-03-08 PROCEDURE — 96375 TX/PRO/DX INJ NEW DRUG ADDON: CPT

## 2021-03-08 PROCEDURE — 70498 CT ANGIOGRAPHY NECK: CPT | Performed by: EMERGENCY MEDICINE

## 2021-03-08 PROCEDURE — 99285 EMERGENCY DEPT VISIT HI MDM: CPT

## 2021-03-08 PROCEDURE — 83735 ASSAY OF MAGNESIUM: CPT | Performed by: EMERGENCY MEDICINE

## 2021-03-08 PROCEDURE — 86140 C-REACTIVE PROTEIN: CPT | Performed by: EMERGENCY MEDICINE

## 2021-03-08 PROCEDURE — 96374 THER/PROPH/DIAG INJ IV PUSH: CPT

## 2021-03-08 PROCEDURE — 80048 BASIC METABOLIC PNL TOTAL CA: CPT | Performed by: EMERGENCY MEDICINE

## 2021-03-08 RX ORDER — MORPHINE SULFATE 4 MG/ML
4 INJECTION, SOLUTION INTRAMUSCULAR; INTRAVENOUS ONCE
Status: COMPLETED | OUTPATIENT
Start: 2021-03-08 | End: 2021-03-08

## 2021-03-08 RX ORDER — KETOROLAC TROMETHAMINE 15 MG/ML
15 INJECTION, SOLUTION INTRAMUSCULAR; INTRAVENOUS ONCE
Status: COMPLETED | OUTPATIENT
Start: 2021-03-08 | End: 2021-03-08

## 2021-03-08 RX ORDER — DIPHENHYDRAMINE HYDROCHLORIDE 50 MG/ML
25 INJECTION INTRAMUSCULAR; INTRAVENOUS ONCE
Status: COMPLETED | OUTPATIENT
Start: 2021-03-08 | End: 2021-03-08

## 2021-03-08 RX ORDER — METOCLOPRAMIDE HYDROCHLORIDE 5 MG/ML
10 INJECTION INTRAMUSCULAR; INTRAVENOUS ONCE
Status: COMPLETED | OUTPATIENT
Start: 2021-03-08 | End: 2021-03-08

## 2021-03-08 RX ORDER — TETRACAINE HYDROCHLORIDE 5 MG/ML
1 SOLUTION OPHTHALMIC ONCE
Status: COMPLETED | OUTPATIENT
Start: 2021-03-08 | End: 2021-03-08

## 2021-03-08 RX ORDER — MECLIZINE HYDROCHLORIDE 25 MG/1
25 TABLET ORAL 3 TIMES DAILY PRN
Qty: 30 TABLET | Refills: 0 | Status: SHIPPED | OUTPATIENT
Start: 2021-03-08

## 2021-03-08 NOTE — TELEPHONE ENCOUNTER
Attempted to reach patient . Patient unavailable. Mailbox is full. Unable to leave a message. Will attempt to reach patient at a later time.

## 2021-03-08 NOTE — ED PROVIDER NOTES
Patient Seen in: [facility] Emergency Department    History   Patient presents with:  Hypertension    Stated Complaint: hypertension    HPI    Patient is here with concerns about high blood pressure and a dizzy sensation.  She has had this dizzy se MG) BY MOUTH EVERY NIGHT   Pantoprazole Sodium 40 MG Oral Tab EC,  TAKE 1 TABLET BY MOUTH EVERY MORNING BEFORE BREAKFAST   Metoprolol Succinate ER 25 MG Oral Tablet 24 Hr,  Take 1 tablet (25 mg total) by mouth 2 (two) times a day.  Qnoon   temazepam 30 MG O appear normal, no lesions. Cardiovascular:  Normal S1 and S2, no murmur, regular, with good peripheral perfusion. Respiratory:  Lungs clear to auscultation bilaterally with good effort. No wheezes, ronchi, or rales.   Abdomen:  Soft, non-tender, non-dist PLATELET.   Procedure                               Abnormality         Status                     ---------                               -----------         ------                     CBC W/ DIFFERENTIAL[[id number]]                              Final resul

## 2021-03-08 NOTE — TELEPHONE ENCOUNTER
Patient calling stating  she is getting discharged from the hospital today. Per patient, she is still not feeling very well and the headache is not gone. CTA brain  and CA carotid done and showed no acute processes.    Patient requesting  an appointment

## 2021-03-08 NOTE — ED INITIAL ASSESSMENT (HPI)
Pt to rm 26 from triage and triaged at bedside for complaint of headache and dizzy, states was seen here yesterday for a UTI however states the headache was never addressed, pt unable to sleep, states tylenol and ibuprofen dont work

## 2021-03-08 NOTE — ED PROVIDER NOTES
Patient Seen in: [facility] AND [facility] Emergency Department      History   Patient presents with:  Dizziness    Stated Complaint: Dizzy ear ache and headache     HPI/Subjective:   HPI    59-year-old female presents for evaluation for headache.   Headache is s Gastrointestinal: Negative. Genitourinary: Negative. Musculoskeletal: Negative. Skin: Negative. Neurological: Negative. All other systems reviewed and are negative.       Positive for stated complaint: Dizzy ear ache and headache   Other sy Comments: Bilateral horizontal nystagmus self limited   Neck:      Vascular: No carotid bruit. Cardiovascular:      Rate and Rhythm: Normal rate and regular rhythm. Pulses: Normal pulses. Heart sounds: Normal heart sounds.    Pulmonary:      Eff CBC WITH DIFFERENTIAL WITH PLATELET    Narrative: The following orders were created for panel order CBC WITH DIFFERENTIAL WITH PLATELET.   Procedure                               Abnormality         Status                     --------- of internal carotid stenosis is based on NASCET Criteria. FINDINGS: NONCONTRAST HEAD CT: CSF SPACES: No hydrocephalus, subarachnoid hemorrhage, or effacement of the basal cisterns is appreciated. There is no extra-axial fluid collection.  CEREBRUM: No ac OTHER: Left chest wall pacemaker is noted on  radiograph. CONCLUSION:  1. Unremarkable CT angiography of the head. No evidence of intracranial flow limiting stenosis or aneurysm.  2. Noncontrast head CT demonstrates no acute intracranial hemor stenosis or dissection. LEFT COMMON CAROTID: No hemodynamically significant stenosis or dissection. LEFT INTERNAL CAROTID: No hemodynamically significant stenosis or dissection. VERTEBRAL ARTERIES:  Calcified plaque mildly narrowing right V4.   Mild n were addressed and answered.                                     Disposition and Plan     Clinical Impression:  Acute nonintractable headache, unspecified headache type  (primary encounter diagnosis)  Dizziness    Disposition:  Discharge  3/8/2021 12:12 pm

## 2021-03-08 NOTE — ED NOTES
Pt was assisted to and from bathroom 4 times int he last 2 hours. Pt was ambulatory with steady gait each time. No vomiting. Plan for medications and discharge discussed.

## 2021-03-09 NOTE — TELEPHONE ENCOUNTER
Pt called with complaint of high blood pressure headache left side face pain numbness encourage patient to call 911 and  go to ER patient agreed.   Just JAMEE

## 2021-03-09 NOTE — TELEPHONE ENCOUNTER
Message # [phone]         2021 04:54p   [LINDAV]  To:  From:  FAISAL Holman MD:  Phone#:  ----------------------------------------------------------------------  James Matamoros [phone],  44, HIGH BLOOD PRESSURE, LEFT SIDE OF FACE AND EAR IN

## 2021-03-09 NOTE — TELEPHONE ENCOUNTER
Called voicemail is full unable to leave message appointment was scheduled per dr. Yuli Robledo request

## 2021-03-12 PROBLEM — J98.01 BRONCHOSPASM: Status: RESOLVED | Noted: 2020-11-29 | Resolved: 2021-03-12

## 2021-03-12 PROBLEM — R06.03 ACUTE RESPIRATORY DISTRESS: Status: RESOLVED | Noted: 2020-11-28 | Resolved: 2021-03-12

## 2021-04-07 ENCOUNTER — ANCILLARY PROCEDURE (OUTPATIENT)
Dept: CARDIOLOGY | Age: 81
End: 2021-04-07
Attending: INTERNAL MEDICINE

## 2021-04-07 DIAGNOSIS — Z45.02 ENCOUNTER FOR ASSESSMENT OF IMPLANTABLE CARDIOVERTER-DEFIBRILLATOR (ICD): ICD-10-CM

## 2021-04-07 PROCEDURE — 93294 REM INTERROG EVL PM/LDLS PM: CPT | Performed by: INTERNAL MEDICINE

## 2021-04-07 PROCEDURE — 93296 REM INTERROG EVL PM/IDS: CPT | Performed by: INTERNAL MEDICINE

## 2021-04-09 ENCOUNTER — TELEPHONE (OUTPATIENT)
Dept: CASE MANAGEMENT | Age: 81
End: 2021-04-09

## 2021-04-09 NOTE — TELEPHONE ENCOUNTER
Patient is eligible for a 2021 Medicare Annual Wellness visit. Discussed in detail w/patient. Appt scheduled for 8/17/21.

## 2021-04-29 ENCOUNTER — TELEPHONE (OUTPATIENT)
Dept: INTERNAL MEDICINE CLINIC | Facility: CLINIC | Age: 81
End: 2021-04-29

## 2021-04-29 RX ORDER — METOPROLOL SUCCINATE 50 MG/1
75 TABLET, EXTENDED RELEASE ORAL 2 TIMES DAILY
Qty: 270 TABLET | Refills: 1 | Status: SHIPPED | OUTPATIENT
Start: 2021-04-29 | End: 2021-06-03

## 2021-04-29 RX ORDER — PANTOPRAZOLE SODIUM 40 MG/1
TABLET, DELAYED RELEASE ORAL
Qty: 90 TABLET | Refills: 1 | Status: SHIPPED | OUTPATIENT
Start: 2021-04-29

## 2021-05-19 NOTE — TELEPHONE ENCOUNTER
Last dose I have is 50 mg with 75 2 times a day. The dosing was not increased unless it was increased by cardiology the last office visit that I see in the system from cardiology was 75 twice a day.

## 2021-05-19 NOTE — TELEPHONE ENCOUNTER
Spoke with patient ( verified)--states she did  Pantoprazole as ordered 2021, but [facility] sttes they never received Rx for Metoprolol Succinate ER 50 mg tabs sent the same time.     Tim Connelly also don't have the 25 mg Metoprolol Succinate--I

## 2021-05-20 NOTE — TELEPHONE ENCOUNTER
Spoke to patient, informed of message. Medication sent in for Metoprolol 50 mg states to take 1.5 mg twice a day. Informed patient directions, script is ready for . Medication list updated.

## 2021-06-03 RX ORDER — METOPROLOL SUCCINATE 50 MG/1
75 TABLET, EXTENDED RELEASE ORAL 2 TIMES DAILY
Qty: 270 TABLET | Refills: 1 | Status: SHIPPED | OUTPATIENT
Start: 2021-06-03

## 2021-06-03 NOTE — TELEPHONE ENCOUNTER
Patient calling and states she still has not received her metoprolol prescription.  Was supposed to be filled 5/19/21 reviewed chart, last filled 4/19/21  Prescription sent as requested and approved by doctor    Notified patient prescription sent and receiv

## 2021-06-14 ENCOUNTER — TELEPHONE (OUTPATIENT)
Dept: INTERNAL MEDICINE CLINIC | Facility: CLINIC | Age: 81
End: 2021-06-14

## 2021-06-14 NOTE — TELEPHONE ENCOUNTER
Prior authorization for Metoprolol ER completed w/ [employer] on cover my meds Key: [id number], turn around time 1-5 days.

## 2021-07-17 ENCOUNTER — ANCILLARY PROCEDURE (OUTPATIENT)
Dept: CARDIOLOGY | Age: 81
End: 2021-07-17
Attending: INTERNAL MEDICINE

## 2021-07-17 ENCOUNTER — ANCILLARY ORDERS (OUTPATIENT)
Dept: CARDIOLOGY | Age: 81
End: 2021-07-17

## 2021-07-17 DIAGNOSIS — Z95.810 ICD (IMPLANTABLE CARDIOVERTER-DEFIBRILLATOR) IN PLACE: ICD-10-CM

## 2021-07-17 PROCEDURE — X1114 CARDIAC DEVICE HOME CHECK - REMOTE UNSCHEDULED: HCPCS | Performed by: INTERNAL MEDICINE

## 2021-07-28 RX ORDER — LEVOTHYROXINE SODIUM 0.12 MG/1
TABLET ORAL
Qty: 90 TABLET | Refills: 1 | Status: SHIPPED | OUTPATIENT
Start: 2021-07-28 | End: 2022-04-25

## 2021-07-28 RX ORDER — ATORVASTATIN CALCIUM 40 MG/1
TABLET, FILM COATED ORAL
Qty: 90 TABLET | Refills: 1 | Status: SHIPPED | OUTPATIENT
Start: 2021-07-28 | End: 2022-01-24

## 2021-07-28 RX ORDER — ALLOPURINOL 300 MG/1
TABLET ORAL
Qty: 90 TABLET | Refills: 1 | Status: SHIPPED | OUTPATIENT
Start: 2021-07-28 | End: 2022-02-07

## 2021-08-17 ENCOUNTER — OFFICE VISIT (OUTPATIENT)
Dept: INTERNAL MEDICINE CLINIC | Facility: CLINIC | Age: 81
End: 2021-08-17
Payer: MEDICARE

## 2021-08-17 ENCOUNTER — TELEPHONE (OUTPATIENT)
Dept: INTERNAL MEDICINE CLINIC | Facility: CLINIC | Age: 81
End: 2021-08-17

## 2021-08-17 VITALS
OXYGEN SATURATION: 95 % | HEIGHT: 64 IN | BODY MASS INDEX: 30.39 KG/M2 | RESPIRATION RATE: 18 BRPM | SYSTOLIC BLOOD PRESSURE: 114 MMHG | TEMPERATURE: 97 F | HEART RATE: 79 BPM | DIASTOLIC BLOOD PRESSURE: 72 MMHG | WEIGHT: 178 LBS

## 2021-08-17 DIAGNOSIS — E53.8 B12 DEFICIENCY: ICD-10-CM

## 2021-08-17 DIAGNOSIS — R10.31 RIGHT LOWER QUADRANT ABDOMINAL PAIN: ICD-10-CM

## 2021-08-17 DIAGNOSIS — G43.009 MIGRAINE WITHOUT AURA AND WITHOUT STATUS MIGRAINOSUS, NOT INTRACTABLE: ICD-10-CM

## 2021-08-17 DIAGNOSIS — R76.8 ANA POSITIVE: ICD-10-CM

## 2021-08-17 DIAGNOSIS — I27.20 PULMONARY HYPERTENSION (HCC): ICD-10-CM

## 2021-08-17 DIAGNOSIS — Z00.00 ENCOUNTER FOR ANNUAL HEALTH EXAMINATION: ICD-10-CM

## 2021-08-17 DIAGNOSIS — I34.0 NONRHEUMATIC MITRAL VALVE REGURGITATION: ICD-10-CM

## 2021-08-17 DIAGNOSIS — Z45.02 IMPLANTABLE DEFIBRILLATOR REPROGRAMMING/CHECK: ICD-10-CM

## 2021-08-17 DIAGNOSIS — R74.8 ELEVATED ALKALINE PHOSPHATASE MEASUREMENT: ICD-10-CM

## 2021-08-17 DIAGNOSIS — Z71.85 VACCINE COUNSELING: ICD-10-CM

## 2021-08-17 DIAGNOSIS — E78.2 MIXED HYPERLIPIDEMIA: ICD-10-CM

## 2021-08-17 DIAGNOSIS — I35.9 AORTIC VALVE DISORDER: ICD-10-CM

## 2021-08-17 DIAGNOSIS — E04.2 MULTIPLE THYROID NODULES: ICD-10-CM

## 2021-08-17 DIAGNOSIS — K59.01 SLOW TRANSIT CONSTIPATION: ICD-10-CM

## 2021-08-17 DIAGNOSIS — M51.37 DEGENERATION OF LUMBAR OR LUMBOSACRAL INTERVERTEBRAL DISC: ICD-10-CM

## 2021-08-17 DIAGNOSIS — M10.00 ACUTE IDIOPATHIC GOUT, UNSPECIFIED SITE: ICD-10-CM

## 2021-08-17 DIAGNOSIS — I10 ESSENTIAL HYPERTENSION: ICD-10-CM

## 2021-08-17 DIAGNOSIS — I42.0 DILATED CARDIOMYOPATHY (HCC): ICD-10-CM

## 2021-08-17 DIAGNOSIS — Z95.0 S/P BIVENTRICULAR CARDIAC PACEMAKER PROCEDURE: ICD-10-CM

## 2021-08-17 DIAGNOSIS — R73.9 HYPERGLYCEMIA: ICD-10-CM

## 2021-08-17 DIAGNOSIS — I44.7 LEFT BUNDLE BRANCH BLOCK (LBBB) ON ELECTROCARDIOGRAM: ICD-10-CM

## 2021-08-17 DIAGNOSIS — E55.9 VITAMIN D DEFICIENCY: Primary | ICD-10-CM

## 2021-08-17 DIAGNOSIS — G47.09 OTHER INSOMNIA: ICD-10-CM

## 2021-08-17 DIAGNOSIS — I42.8 NONISCHEMIC CARDIOMYOPATHY (HCC): ICD-10-CM

## 2021-08-17 DIAGNOSIS — I50.21 ACUTE SYSTOLIC CHF (CONGESTIVE HEART FAILURE), NYHA CLASS 1 (HCC): ICD-10-CM

## 2021-08-17 DIAGNOSIS — N18.32 STAGE 3B CHRONIC KIDNEY DISEASE (HCC): Chronic | ICD-10-CM

## 2021-08-17 PROBLEM — G44.209 ACUTE NON INTRACTABLE TENSION-TYPE HEADACHE: Status: RESOLVED | Noted: 2020-11-29 | Resolved: 2021-08-17

## 2021-08-17 LAB
ALBUMIN SERPL-MCNC: 3.1 G/DL (ref 3.4–5)
ALBUMIN/GLOB SERPL: 0.7 {RATIO} (ref 1–2)
ALP LIVER SERPL-CCNC: 204 U/L
ALT SERPL-CCNC: 16 U/L
ANION GAP SERPL CALC-SCNC: 6 MMOL/L (ref 0–18)
APPEARANCE: CLEAR
AST SERPL-CCNC: 13 U/L (ref 15–37)
BASOPHILS # BLD AUTO: 0.03 X10(3) UL (ref 0–0.2)
BASOPHILS NFR BLD AUTO: 0.5 %
BILIRUB SERPL-MCNC: 0.5 MG/DL (ref 0.1–2)
BILIRUBIN: NEGATIVE
BUN BLD-MCNC: 12 MG/DL (ref 7–18)
BUN/CREAT SERPL: 13 (ref 10–20)
CALCIUM BLD-MCNC: 8.8 MG/DL (ref 8.5–10.1)
CHLORIDE SERPL-SCNC: 104 MMOL/L (ref 98–112)
CHOLEST SMN-MCNC: 175 MG/DL (ref ?–200)
CO2 SERPL-SCNC: 30 MMOL/L (ref 21–32)
CREAT BLD-MCNC: 0.92 MG/DL
DEPRECATED RDW RBC AUTO: 45.3 FL (ref 35.1–46.3)
EOSINOPHIL # BLD AUTO: 0.24 X10(3) UL (ref 0–0.7)
EOSINOPHIL NFR BLD AUTO: 3.7 %
ERYTHROCYTE [DISTWIDTH] IN BLOOD BY AUTOMATED COUNT: 14.1 % (ref 11–15)
EST. AVERAGE GLUCOSE BLD GHB EST-MCNC: 140 MG/DL (ref 68–126)
GLOBULIN PLAS-MCNC: 4.3 G/DL (ref 2.8–4.4)
GLUCOSE (URINE DIPSTICK): NEGATIVE MG/DL
GLUCOSE BLD-MCNC: 89 MG/DL (ref 70–99)
HBA1C MFR BLD HPLC: 6.5 % (ref ?–5.7)
HCT VFR BLD AUTO: 38.8 %
HDLC SERPL-MCNC: 37 MG/DL (ref 40–59)
HGB BLD-MCNC: 12.1 G/DL
IMM GRANULOCYTES # BLD AUTO: 0.01 X10(3) UL (ref 0–1)
IMM GRANULOCYTES NFR BLD: 0.2 %
KETONES (URINE DIPSTICK): NEGATIVE MG/DL
LDLC SERPL CALC-MCNC: 108 MG/DL (ref ?–100)
LYMPHOCYTES # BLD AUTO: 1.65 X10(3) UL (ref 1–4)
LYMPHOCYTES NFR BLD AUTO: 25.4 %
M PROTEIN MFR SERPL ELPH: 7.4 G/DL (ref 6.4–8.2)
MCH RBC QN AUTO: 27.5 PG (ref 26–34)
MCHC RBC AUTO-ENTMCNC: 31.2 G/DL (ref 31–37)
MCV RBC AUTO: 88.2 FL
MONOCYTES # BLD AUTO: 0.48 X10(3) UL (ref 0.1–1)
MONOCYTES NFR BLD AUTO: 7.4 %
MULTISTIX LOT#: ABNORMAL NUMERIC
NEUTROPHILS # BLD AUTO: 4.09 X10 (3) UL (ref 1.5–7.7)
NEUTROPHILS # BLD AUTO: 4.09 X10(3) UL (ref 1.5–7.7)
NEUTROPHILS NFR BLD AUTO: 62.8 %
NITRITE, URINE: NEGATIVE
NONHDLC SERPL-MCNC: 138 MG/DL (ref ?–130)
OCCULT BLOOD: NEGATIVE
OSMOLALITY SERPL CALC.SUM OF ELEC: 289 MOSM/KG (ref 275–295)
PATIENT FASTING Y/N/NP: YES
PATIENT FASTING Y/N/NP: YES
PH, URINE: 7 (ref 4.5–8)
PLATELET # BLD AUTO: 198 10(3)UL (ref 150–450)
POTASSIUM SERPL-SCNC: 2.9 MMOL/L (ref 3.5–5.1)
PROTEIN (URINE DIPSTICK): NEGATIVE MG/DL
RBC # BLD AUTO: 4.4 X10(6)UL
SODIUM SERPL-SCNC: 140 MMOL/L (ref 136–145)
SPECIFIC GRAVITY: 1.02 (ref 1–1.03)
T4 FREE SERPL-MCNC: 1.2 NG/DL (ref 0.8–1.7)
TRIGL SERPL-MCNC: 171 MG/DL (ref 30–149)
TSI SER-ACNC: 1.44 MIU/ML (ref 0.36–3.74)
URATE SERPL-MCNC: 3.5 MG/DL
URINE-COLOR: YELLOW
UROBILINOGEN,SEMI-QN: 2 MG/DL (ref 0–1.9)
VIT B12 SERPL-MCNC: >2000 PG/ML (ref 193–986)
VIT D+METAB SERPL-MCNC: 32.1 NG/ML (ref 30–100)
VLDLC SERPL CALC-MCNC: 29 MG/DL (ref 0–30)
WBC # BLD AUTO: 6.5 X10(3) UL (ref 4–11)

## 2021-08-17 PROCEDURE — 3078F DIAST BP <80 MM HG: CPT | Performed by: INTERNAL MEDICINE

## 2021-08-17 PROCEDURE — 81003 URINALYSIS AUTO W/O SCOPE: CPT | Performed by: INTERNAL MEDICINE

## 2021-08-17 PROCEDURE — G0439 PPPS, SUBSEQ VISIT: HCPCS | Performed by: INTERNAL MEDICINE

## 2021-08-17 PROCEDURE — 99397 PER PM REEVAL EST PAT 65+ YR: CPT | Performed by: INTERNAL MEDICINE

## 2021-08-17 PROCEDURE — 96160 PT-FOCUSED HLTH RISK ASSMT: CPT | Performed by: INTERNAL MEDICINE

## 2021-08-17 PROCEDURE — 3074F SYST BP LT 130 MM HG: CPT | Performed by: INTERNAL MEDICINE

## 2021-08-17 PROCEDURE — 3008F BODY MASS INDEX DOCD: CPT | Performed by: INTERNAL MEDICINE

## 2021-08-17 RX ORDER — TEMAZEPAM 30 MG/1
30 CAPSULE ORAL NIGHTLY PRN
Qty: 30 CAPSULE | Refills: 0 | Status: SHIPPED | OUTPATIENT
Start: 2021-08-17

## 2021-08-17 NOTE — PROGRESS NOTES
HPI:    Patient ID: Kartik Greer is a 68year old female. Kartik Greer is a 68year old female who presents for a complete physical exam.   HPI:   Patient presents with:  Wellness Visit: medicare annual  Pain: right lower quadrant       No LMP recorded.  P capsule 0   • ALLOPURINOL 300 MG Oral Tab TAKE 1 TABLET(300 MG) BY MOUTH EVERY DAY 90 tablet 1   • ATORVASTATIN 40 MG Oral Tab TAKE 1 TABLET(40 MG) BY MOUTH EVERY NIGHT 90 tablet 1   • LEVOTHYROXINE SODIUM 125 MCG Oral Tab TAKE 1 TABLET(125 MCG) BY MOUTH B Relation Age of Onset   • Hypertension Mother    • Heart Disease Mother 36        CAD S/P MI.    • Cancer Brother         Lung cancer. Construction.        Social History:  Social History    Socioeconomic History      Marital status:       Spouse na T4F 1.2 02/04/2020 05:03 AM        Lab Results   Component Value Date/Time    CHOLEST 146 08/06/2020 12:20 PM    HDL 45 08/06/2020 12:20 PM    TRIG 180 (H) 08/06/2020 12:20 PM    LDL 65 08/06/2020 12:20 PM    [place] 101 08/06/2020 12:20 PM           Wt Re fever and unexpected weight change.    HENT: Negative for congestion, dental problem, drooling, ear discharge, ear pain, facial swelling, hearing loss, mouth sores, nosebleeds, postnasal drip, rhinorrhea, sinus pressure, sinus pain, sneezing, sore throat, t 90 tablet 1   • ATORVASTATIN 40 MG Oral Tab TAKE 1 TABLET(40 MG) BY MOUTH EVERY NIGHT 90 tablet 1   • LEVOTHYROXINE SODIUM 125 MCG Oral Tab TAKE 1 TABLET(125 MCG) BY MOUTH BEFORE BREAKFAST 90 tablet 1   • Metoprolol Succinate ER 50 MG Oral Tablet 24 Hr [place] Hypertension Mother    • Heart Disease Mother 36        CAD S/P MI.    • Cancer Brother         Lung cancer. Construction.        Social History:   Social History    Tobacco Use      Smoking status: Never Smoker      Smokeless tobacco: Never Used    Alcohol sinus tenderness or frontal sinus tenderness. Left Sinus: No maxillary sinus tenderness or frontal sinus tenderness. Mouth/Throat:      Comments: Patient wearing mask. Did not have patient remove mask due to current Covid virus situation.   Eyes decreased air movement or transmitted upper airway sounds. No decreased breath sounds, wheezing, rhonchi or rales. Chest:      Chest wall: No tenderness.       Breasts: Breasts are symmetrical.         Right: No swelling, bleeding, inverted nipple, mass, normal.         Speech: Speech normal.         Behavior: Behavior normal. Behavior is cooperative. ASSESSMENT/PLAN:   Vitamin d deficiency  (primary encounter diagnosis)CHeck blood. Vaccine counseling  Check about covid vaccine status.  Desirae Ott failure), nyha class 1 (hcc) Stable. Check echo? Will check with Dr. Jason Smith. Right lower quadrant abdominal pain ? Etiology ? Kidney. Check Ct.      Orders Placed This Encounter      Lipid Panel      CBC With Differential With Platelet      TSH W Reflex T Advance care planning including the explanation and discussion of advance directives standard forms performed Face to Face with patient and Family/surrogate (if present), and forms available to patient in AVS  16+ minutes was spent with all [facility] 12/11/2020    CA 9.2 03/08/2021    ALB 3.0 (L) 12/11/2020    TSH 0.625 08/06/2020    CREATSERUM 1.04 (H) 03/08/2021     (H) 03/08/2021        CBC  (most recent labs)   Lab Results   Component Value Date    WBC 8.2 03/08/2021    HGB 13.0 03/08/2021 thyroidectomy (8/31/15); colonoscopy; and upper gi endoscopy,exam.    Her family history includes Cancer in her brother; Heart Disease (age of onset: 36) in her mother; Hypertension in her mother. SOCIAL HISTORY:   She  reports that she has never smoked. friends have told me they think I may have hearing loss:  No               Visual Acuity  Right Eye Visual Acuity: Corrected Right Eye Chart Acuity: 20/25   Left Eye Visual Acuity: Corrected Left Eye Chart Acuity: 20/20   Both Eyes Visual Acuity: Corrected site  -     URIC ACID    Essential hypertension    Elevated alkaline phosphatase measurement    Dilated cardiomyopathy (HCC)    Degeneration of lumbar or lumbosacral intervertebral disc    Stage 3b chronic kidney disease (HCC)  -     URINALYSIS, AUTO, W/O diagnosed with pre-diabetes   One screening every 6 months if diagnosed with pre-diabetes Lab Results   Component Value Date     (H) 03/08/2021        Cardiovascular Disease Screening    Lipid Panel  Cholesterol  Lipoprotein (HDL)  Triglycerides Cov Immunizations    Influenza Covered once per flu season  Please get every year 10/06/2020  No recommendations at this time    Pneumococcal Each vaccine (Ncimsss57 & [id number]) covered once after 65 Prevnar 13: 01/19/2016    Gpyhnrygw46: 03/12/2018     N

## 2021-08-17 NOTE — PATIENT INSTRUCTIONS
ASSESSMENT/PLAN:   Vitamin d deficiency  (primary encounter diagnosis)CHeck blood. Vaccine counseling  Check about covid vaccine status. Check o insurance coverage with shingrix. Slow transit constipation Stable with citrucel.      S/p biventri pain ? Etiology ? Kidney. Check Ct.      Orders Placed This Encounter      Lipid Panel      CBC With Differential With Platelet      TSH W Reflex To Free T4      Free T4, (Free Thyroxine)      Hemoglobin A1C      URINALYSIS, AUTO, W/O SCOPE      Vitamin D body, spreading along the nerve pathway where the virus is reactivating. ? The rash can also form around an eye, along one side of the face or neck, or in the mouth.   ? In a few people, often those with a weak immune system, shingles appear on more than hours of the rash can reduce the chance of postherpetic neuralgia. Other medicines can be prescribed to help ease the pain and improve quality of life. · Bacterial infection. Shingles blisters may get infected with bacteria.  Depending on the severity of t You should get the second RZV shot 2 to 6 months after the first. The vaccine makes it less likely that you will develop shingles. If you do develop shingles, your symptoms will likely be milder than if you hadn’t been vaccinated.  Олег Harper is also advised even chills  · headache  · fever  · nausea, vomiting  · redness, warmth, pain, swelling or itching at site where injected  · tiredness  What may interact with this medicine?     · medicines that suppress your immune system  · medicines to treat cancer  · steroid with pre-diabetes Lab Results   Component Value Date     (H) 03/08/2021        Cardiovascular Disease Screening    Lipid Panel  Cholesterol  Lipoprotein (HDL)  Triglycerides Covered every 5 years for all Medicare beneficiaries without apparent signs 10/06/2020  No recommendations at this time    Pneumococcal Each vaccine (Skcgfgg37 & [id number]) covered once after 65 Prevnar 13: 01/19/2016    Jvczsdcmh27: 03/12/2018     No recommendations at this time    Hepatitis B One screening covered for patients

## 2021-08-18 PROBLEM — E11.65 UNCONTROLLED TYPE 2 DIABETES MELLITUS WITH HYPERGLYCEMIA (HCC): Status: ACTIVE | Noted: 2021-08-18

## 2021-08-19 DIAGNOSIS — E87.6 HYPOKALEMIA: ICD-10-CM

## 2021-08-19 LAB
THYROGLOBULIN ANTIBODY: <1.8 IU/ML
THYROGLOBULIN TUMOR MARKER: <0.1 NG/ML

## 2021-08-19 RX ORDER — POTASSIUM CHLORIDE 1500 MG/1
1 TABLET, FILM COATED, EXTENDED RELEASE ORAL 2 TIMES DAILY
Qty: 60 TABLET | Refills: 0 | Status: SHIPPED | OUTPATIENT
Start: 2021-08-19 | End: 2021-08-19

## 2021-08-19 RX ORDER — POTASSIUM CHLORIDE 1500 MG/1
1 TABLET, FILM COATED, EXTENDED RELEASE ORAL 2 TIMES DAILY
Qty: 60 TABLET | Refills: 0 | Status: SHIPPED | OUTPATIENT
Start: 2021-08-19 | End: 2021-08-22

## 2021-08-19 RX ORDER — POTASSIUM CHLORIDE 1500 MG/1
1 TABLET, EXTENDED RELEASE ORAL 2 TIMES DAILY
Qty: 60 TABLET | Refills: 0 | Status: SHIPPED | OUTPATIENT
Start: 2021-08-19 | End: 2021-08-19

## 2021-08-19 NOTE — TELEPHONE ENCOUNTER
Please review. Protocol failed or does not have a protocol.      Requested Prescriptions   Pending Prescriptions Disp Refills    POTASSIUM CHLORIDE ER 20 MEQ Oral Tab CR [Pharmacy Med Name: POTASSIUM CHLORIDE 20MEQ ER TABLETS] 180 tablet 0     Sig: TAKE 1 TABLET BY MOUTH TWICE DAILY        There is no refill protocol information for this order         Future Appointments         Provider Department Appt Notes    In 6 months Enma Ochoa MD [place] [employer] [facility], [address] [facility]           Recent Outpatient Visits              2 days ago Vitamin D deficiency    [place] [employer] [facility], [address], Adina Heaton MD    Office Visit    7 months ago Essential hypertension    [address] [place] [address], [facility]    Virtual Phone E/M    8 months ago Acute systolic CHF (congestive heart failure), NYHA class 1 [facility])    Jose Lee MD    Office Visit    8 months ago Nonischemic cardiomyopathy [facility])    [address] [facility], [facility]    Office Visit    10 months ago Acute systolic CHF (congestive heart failure), NYHA class 1 [facility])    [place] [employer] [facility], [address], Adina Heaton MD    Office Visit

## 2021-08-19 NOTE — TELEPHONE ENCOUNTER
Please review. Protocol failed, medication class does not have a protocol.      Requested Prescriptions   Pending Prescriptions Disp Refills    POTASSIUM CHLORIDE ER 20 MEQ Oral Tab CR [Pharmacy Med Name: POTASSIUM CHLORIDE 20MEQ ER TABLETS] 180 tablet 0

## 2021-08-19 NOTE — TELEPHONE ENCOUNTER
Per Pharmacy note regarding potassium refill request-wants 90 day supply (see below)     Note to Pharmacy:   **Patient requests 90 days supplyGrabiel Kuo and spoke with Keiry Flores, informed that PCP only wants 60 tablets for this medication, st

## 2021-08-20 RX ORDER — POTASSIUM CHLORIDE 1500 MG/1
1 TABLET, FILM COATED, EXTENDED RELEASE ORAL 2 TIMES DAILY
Qty: 60 TABLET | Refills: 0 | OUTPATIENT
Start: 2021-08-20

## 2021-08-20 NOTE — TELEPHONE ENCOUNTER
Robert Conception, APRN  You; Em Rn Triage 2 hours ago (2:09 PM)   Marah Blocker  Dr. Nam Slider would like to know if patient cardiologist would recommend pt have follow up ECHO.     Message text

## 2021-08-20 NOTE — TELEPHONE ENCOUNTER
Spoke to Ling at [facility] and relayed message below. She will relay message to patient's cardiologist, Dr. Es Desai.

## 2021-08-20 NOTE — TELEPHONE ENCOUNTER
Judy Quarles, representative at [facility] returning Su. [phone]    Please clarify message.

## 2021-08-21 DIAGNOSIS — E87.6 HYPOKALEMIA: ICD-10-CM

## 2021-08-22 RX ORDER — POTASSIUM CHLORIDE 1500 MG/1
1 TABLET, FILM COATED, EXTENDED RELEASE ORAL 2 TIMES DAILY
Qty: 60 TABLET | Refills: 0 | Status: SHIPPED | OUTPATIENT
Start: 2021-08-22 | End: 2021-08-25

## 2021-08-24 NOTE — TELEPHONE ENCOUNTER
Dr. Nataliya Reyes, you are sending your replies as routing comments and they cannot be viewed in the chart. When I look back at all the other messages, they are not visible to me. As this message you sent will not be visible to anyone else.      Do I understand th

## 2021-08-25 DIAGNOSIS — E87.6 HYPOKALEMIA: ICD-10-CM

## 2021-08-25 RX ORDER — POTASSIUM CHLORIDE 1500 MG/1
1 TABLET, EXTENDED RELEASE ORAL 2 TIMES DAILY
Qty: 60 TABLET | Refills: 0 | Status: SHIPPED | OUTPATIENT
Start: 2021-08-25 | End: 2021-08-29

## 2021-08-27 NOTE — TELEPHONE ENCOUNTER
Mauro Glover from [address] returned call to office to speak with Isismayi Rand. webex message sent to VIOLETA Santos, no response. RN Called [phone] at [facility] to get in touch with Ayaka. Phone call dropped from [address].      Site staff

## 2021-08-27 NOTE — TELEPHONE ENCOUNTER
I tried calling [facility] and was on hold for 7 min. I left a message on the option to call us back.    [place] (Provider) [phone]

## 2021-08-27 NOTE — TELEPHONE ENCOUNTER
RN from [facility] called back, states;  YES pt does need echo, she is already on schedule w/ them to have this done at their office.

## 2021-08-27 NOTE — TELEPHONE ENCOUNTER
Called [facility] [place], on hold very long time. Left message on automated message for them to call back.

## 2021-08-28 DIAGNOSIS — E87.6 HYPOKALEMIA: ICD-10-CM

## 2021-08-29 RX ORDER — POTASSIUM CHLORIDE 1500 MG/1
1 TABLET, EXTENDED RELEASE ORAL 2 TIMES DAILY
Qty: 60 TABLET | Refills: 0 | Status: SHIPPED | OUTPATIENT
Start: 2021-08-29 | End: 2021-09-12

## 2021-09-11 DIAGNOSIS — E87.6 HYPOKALEMIA: ICD-10-CM

## 2021-09-12 RX ORDER — POTASSIUM CHLORIDE 1500 MG/1
1 TABLET, EXTENDED RELEASE ORAL 2 TIMES DAILY
Qty: 180 TABLET | Refills: 0 | Status: SHIPPED | OUTPATIENT
Start: 2021-09-12 | End: 2022-03-21

## 2021-09-15 ENCOUNTER — HOSPITAL ENCOUNTER (OUTPATIENT)
Dept: CT IMAGING | Facility: HOSPITAL | Age: 81
Discharge: HOME | End: 2021-09-15
Attending: INTERNAL MEDICINE
Payer: MEDICARE

## 2021-09-15 ENCOUNTER — HOSPITAL ENCOUNTER (OUTPATIENT)
Dept: ULTRASOUND IMAGING | Facility: HOSPITAL | Age: 81
Discharge: HOME | End: 2021-09-15
Attending: INTERNAL MEDICINE
Payer: MEDICARE

## 2021-09-15 ENCOUNTER — TELEPHONE (OUTPATIENT)
Dept: INTERNAL MEDICINE CLINIC | Facility: CLINIC | Age: 81
End: 2021-09-15

## 2021-09-15 ENCOUNTER — LAB ENCOUNTER (OUTPATIENT)
Dept: LAB | Facility: HOSPITAL | Age: 81
End: 2021-09-15
Attending: INTERNAL MEDICINE
Payer: MEDICARE

## 2021-09-15 ENCOUNTER — HOSPITAL ENCOUNTER (OUTPATIENT)
Dept: CV DIAGNOSTICS | Facility: HOSPITAL | Age: 81
Discharge: HOME | End: 2021-09-15
Attending: INTERNAL MEDICINE
Payer: MEDICARE

## 2021-09-15 DIAGNOSIS — E11.9 DIABETES (HCC): Primary | ICD-10-CM

## 2021-09-15 DIAGNOSIS — E87.6 HYPOKALEMIA: ICD-10-CM

## 2021-09-15 DIAGNOSIS — R10.31 RIGHT LOWER QUADRANT ABDOMINAL PAIN: ICD-10-CM

## 2021-09-15 DIAGNOSIS — E04.2 MULTIPLE THYROID NODULES: ICD-10-CM

## 2021-09-15 DIAGNOSIS — I34.0 NONRHEUMATIC MITRAL VALVE REGURGITATION: ICD-10-CM

## 2021-09-15 PROCEDURE — 74176 CT ABD & PELVIS W/O CONTRAST: CPT | Performed by: INTERNAL MEDICINE

## 2021-09-15 PROCEDURE — 93306 TTE W/DOPPLER COMPLETE: CPT | Performed by: INTERNAL MEDICINE

## 2021-09-15 PROCEDURE — 80053 COMPREHEN METABOLIC PANEL: CPT | Performed by: INTERNAL MEDICINE

## 2021-09-15 PROCEDURE — 76536 US EXAM OF HEAD AND NECK: CPT | Performed by: INTERNAL MEDICINE

## 2021-09-15 PROCEDURE — 80061 LIPID PANEL: CPT | Performed by: INTERNAL MEDICINE

## 2021-09-15 PROCEDURE — 36415 COLL VENOUS BLD VENIPUNCTURE: CPT | Performed by: INTERNAL MEDICINE

## 2021-09-15 PROCEDURE — 83036 HEMOGLOBIN GLYCOSYLATED A1C: CPT | Performed by: INTERNAL MEDICINE

## 2021-09-15 NOTE — TELEPHONE ENCOUNTER
Rep from [facility] calling stating that pt has pending labs and pt is their now trying to complete labs. Asking if Dr can sign off on the pending orders.      Asking for confirmation call on ext.  [phone]

## 2022-01-24 RX ORDER — ATORVASTATIN CALCIUM 40 MG/1
40 TABLET, FILM COATED ORAL NIGHTLY
Qty: 90 TABLET | Refills: 1 | Status: SHIPPED | OUTPATIENT
Start: 2022-01-24

## 2022-02-07 ENCOUNTER — TELEPHONE (OUTPATIENT)
Dept: INTERNAL MEDICINE CLINIC | Facility: CLINIC | Age: 82
End: 2022-02-07

## 2022-02-07 RX ORDER — ALLOPURINOL 300 MG/1
300 TABLET ORAL DAILY
Qty: 90 TABLET | Refills: 1 | Status: SHIPPED | OUTPATIENT
Start: 2022-02-07

## 2022-02-14 RX ORDER — PANTOPRAZOLE SODIUM 40 MG/1
40 TABLET, DELAYED RELEASE ORAL
Qty: 90 TABLET | Refills: 0 | Status: SHIPPED | OUTPATIENT
Start: 2022-02-14 | End: 2022-04-25

## 2022-03-05 ENCOUNTER — OFFICE VISIT (OUTPATIENT)
Dept: INTERNAL MEDICINE CLINIC | Facility: CLINIC | Age: 82
End: 2022-03-05
Payer: MEDICARE

## 2022-03-05 VITALS
BODY MASS INDEX: 30.76 KG/M2 | OXYGEN SATURATION: 95 % | TEMPERATURE: 97 F | DIASTOLIC BLOOD PRESSURE: 75 MMHG | RESPIRATION RATE: 17 BRPM | HEART RATE: 89 BPM | WEIGHT: 180.19 LBS | SYSTOLIC BLOOD PRESSURE: 114 MMHG | HEIGHT: 64 IN

## 2022-03-05 DIAGNOSIS — E11.65 UNCONTROLLED TYPE 2 DIABETES MELLITUS WITH HYPERGLYCEMIA (HCC): ICD-10-CM

## 2022-03-05 DIAGNOSIS — M54.50 CHRONIC BILATERAL LOW BACK PAIN WITHOUT SCIATICA: ICD-10-CM

## 2022-03-05 DIAGNOSIS — N18.32 STAGE 3B CHRONIC KIDNEY DISEASE (HCC): Primary | Chronic | ICD-10-CM

## 2022-03-05 DIAGNOSIS — R73.9 HYPERGLYCEMIA: ICD-10-CM

## 2022-03-05 DIAGNOSIS — E04.2 MULTIPLE THYROID NODULES: ICD-10-CM

## 2022-03-05 DIAGNOSIS — I10 ESSENTIAL HYPERTENSION: ICD-10-CM

## 2022-03-05 DIAGNOSIS — G89.29 CHRONIC BILATERAL LOW BACK PAIN WITHOUT SCIATICA: ICD-10-CM

## 2022-03-05 DIAGNOSIS — E55.9 VITAMIN D DEFICIENCY: ICD-10-CM

## 2022-03-05 DIAGNOSIS — I50.21 ACUTE SYSTOLIC CHF (CONGESTIVE HEART FAILURE), NYHA CLASS 1 (HCC): ICD-10-CM

## 2022-03-05 DIAGNOSIS — Z71.85 VACCINE COUNSELING: ICD-10-CM

## 2022-03-05 DIAGNOSIS — E53.8 B12 DEFICIENCY: ICD-10-CM

## 2022-03-05 DIAGNOSIS — R74.8 ELEVATED ALKALINE PHOSPHATASE MEASUREMENT: ICD-10-CM

## 2022-03-05 DIAGNOSIS — E78.2 MIXED HYPERLIPIDEMIA: ICD-10-CM

## 2022-03-05 PROBLEM — C73 PAPILLARY THYROID CARCINOMA (HCC): Status: ACTIVE | Noted: 2022-03-05

## 2022-03-05 PROCEDURE — 3008F BODY MASS INDEX DOCD: CPT | Performed by: INTERNAL MEDICINE

## 2022-03-05 PROCEDURE — 3074F SYST BP LT 130 MM HG: CPT | Performed by: INTERNAL MEDICINE

## 2022-03-05 PROCEDURE — 3078F DIAST BP <80 MM HG: CPT | Performed by: INTERNAL MEDICINE

## 2022-03-05 PROCEDURE — 99215 OFFICE O/P EST HI 40 MIN: CPT | Performed by: INTERNAL MEDICINE

## 2022-03-05 RX ORDER — TEMAZEPAM 30 MG/1
30 CAPSULE ORAL NIGHTLY PRN
Qty: 30 CAPSULE | Refills: 0 | Status: SHIPPED | OUTPATIENT
Start: 2022-03-05

## 2022-03-21 DIAGNOSIS — E87.6 HYPOKALEMIA: ICD-10-CM

## 2022-03-22 RX ORDER — POTASSIUM CHLORIDE 1500 MG/1
1 TABLET, EXTENDED RELEASE ORAL 2 TIMES DAILY
Qty: 180 TABLET | Refills: 1 | Status: SHIPPED | OUTPATIENT
Start: 2022-03-22 | End: 2022-09-02

## 2022-03-22 NOTE — TELEPHONE ENCOUNTER
Please review refill protocol failed/ no protocol  Requested Prescriptions   Pending Prescriptions Disp Refills    POTASSIUM CHLORIDE ER 20 MEQ Oral Tab CR [Pharmacy Med Name: POTASSIUM CHLORIDE 20MEQ ER TABLETS] 60 tablet 0     Sig: TAKE 1 TABLET BY MOUTH TWICE DAILY        There is no refill protocol information for this order

## 2022-03-28 RX ORDER — METOPROLOL SUCCINATE 50 MG/1
75 TABLET, EXTENDED RELEASE ORAL 2 TIMES DAILY
Qty: 270 TABLET | Refills: 1 | Status: SHIPPED | OUTPATIENT
Start: 2022-03-28

## 2022-03-28 NOTE — TELEPHONE ENCOUNTER
Refill passed per [employer] protocol.   Requested Prescriptions   Pending Prescriptions Disp Refills    METOPROLOL SUCCINATE ER 50 MG Oral Tablet 24 Hr [Pharmacy Med Name: METOPROLOL ER SUCCINATE 50MG TABS] 270 tablet 1     Sig: TAKE 1 AND 1/2 TABLETS(75 MG) BY MOUTH TWICE DAILY        Hypertensive Medications Protocol Passed - 3/28/2022  3:45 PM        Passed - CMP or BMP in past 12 months        Passed - Appointment in past 6 or next 3 months        Passed - GFR  > 50     Lab Results   Component Value Date    GFRAA 76 09/15/2021                     Recent Outpatient Visits              3 weeks ago Stage 3b chronic kidney disease [facility])    [facility], [address], Eboni Freeman MD    Office Visit    7 months ago Vitamin D deficiency    [employer] [facility], [address], Eboni Freeman MD    Office Visit    1 year ago Essential hypertension    [address] [facility], [facility]    Virtual Phone E/M    1 year ago Acute systolic CHF (congestive heart failure), NYHA class 1 [facility])    Alysa Carey MD    Office Visit    1 year ago Nonischemic cardiomyopathy [facility])    [address], [place]    Office Visit          Future Appointments         Provider Department Appt Notes    In 4 months Adamaris Weston MD [employer] [facility], [address], [facility]

## 2022-04-25 RX ORDER — PANTOPRAZOLE SODIUM 40 MG/1
40 TABLET, DELAYED RELEASE ORAL
Qty: 90 TABLET | Refills: 1 | Status: SHIPPED | OUTPATIENT
Start: 2022-04-25

## 2022-04-25 RX ORDER — LEVOTHYROXINE SODIUM 0.12 MG/1
TABLET ORAL
Qty: 90 TABLET | Refills: 1 | Status: SHIPPED | OUTPATIENT
Start: 2022-04-25 | End: 2022-09-14 | Stop reason: DRUGHIGH

## 2022-04-25 NOTE — TELEPHONE ENCOUNTER
Refill passed per [employer] [facility] protocol.   Requested Prescriptions   Pending Prescriptions Disp Refills    PANTOPRAZOLE 40 MG Oral Tab EC [Pharmacy Med Name: PANTOPRAZOLE 40MG TABLETS] 90 tablet 0     Sig: TAKE 1 TABLET(40 MG) BY MOUTH BEFORE BREAKFAST        Gastrointestional Medication Protocol Passed - 4/24/2022  5:48 AM        Passed - Appointment in past 12 or next 3 months            Recent Outpatient Visits              1 month ago Stage 3b chronic kidney disease [facility])    [place] [employer] [facility], [address], Mike Bryant MD    Office Visit    8 months ago Vitamin D deficiency    [place] [employer] [facility], [address], Mike Bryant MD    Office Visit    1 year ago Essential hypertension    [address] [facility], [facility]    Virtual Phone E/M    1 year ago Acute systolic CHF (congestive heart failure), NYHA class 1 [facility])    Yoon Castillo MD    Office Visit    1 year ago Nonischemic cardiomyopathy [facility])    [address], [place]ВЕТ[place]АНА Baig    Office Visit          Future Appointments         Provider Department Appt Notes    In 3 months Glenn Carver MD [place] [employer] [facility], [address], [facility]

## 2022-04-25 NOTE — TELEPHONE ENCOUNTER
Refill passed per [place] [employer] [facility] protocol.   Requested Prescriptions   Pending Prescriptions Disp Refills    LEVOTHYROXINE 125 MCG Oral Tab [Pharmacy Med Name: LEVOTHYROXINE 0.125MG (125MCG) TABS] 90 tablet 1     Sig: TAKE 1 TABLET(125 MCG) BY MOUTH BEFORE BREAKFAST        Thyroid Medication Protocol Passed - 4/24/2022  5:48 AM        Passed - TSH in past 12 months        Passed - Last TSH value is normal     Lab Results   Component Value Date    TSH 1.440 08/17/2021    THYROIDFUNC 0.22 (L) 03/21/2016                 Passed - Appointment in past 12 or next 3 months             Recent Outpatient Visits              1 month ago Stage 3b chronic kidney disease [facility])    [facility], [facility], [place], [address] [place] [address]., MD    Office Visit    8 months ago Vitamin D deficiency    Robyn Arreaga [address] [place] [address]., MD    Office Visit    1 year ago Essential hypertension    [address] [facility]    Virtual Phone E/M    1 year ago Acute systolic CHF (congestive heart failure), NYHA class 1 [facility])    Da Staples MD    Office Visit    1 year ago Nonischemic cardiomyopathy [facility])    [address], Will Seng, APRN    Office Visit           Future Appointments         Provider Department Appt Notes    In 3 months Alida Mcmanus MD [facility], [facility], [facility]

## 2022-05-03 RX ORDER — LEVOTHYROXINE SODIUM 0.12 MG/1
TABLET ORAL
Qty: 90 TABLET | Refills: 1 | OUTPATIENT
Start: 2022-05-03

## 2022-06-30 ENCOUNTER — TELEPHONE (OUTPATIENT)
Dept: INTERNAL MEDICINE CLINIC | Facility: CLINIC | Age: 82
End: 2022-06-30

## 2022-06-30 NOTE — TELEPHONE ENCOUNTER
Reached patient for medication adherence consult. Per insurance report, patient is past due for refill on atorvastatin; last filled 1/24/22 for 90 day supply. Patient was able to locate medication bottles and does still have half bottle left of atorvastatin. She denies forgetting or missing doses. She tells me she uses a pillbox to organize her medications. Encouraged patient to continue to do this and really stressed the importance of taking medication just like prescribed to get the most benefit. Patient denies any questions or concerns with medications at this time.

## 2022-07-23 RX ORDER — ATORVASTATIN CALCIUM 40 MG/1
TABLET, FILM COATED ORAL
Qty: 90 TABLET | Refills: 1 | Status: SHIPPED | OUTPATIENT
Start: 2022-07-23 | End: 2022-09-26

## 2022-07-23 RX ORDER — ALLOPURINOL 300 MG/1
TABLET ORAL
Qty: 90 TABLET | Refills: 1 | Status: SHIPPED | OUTPATIENT
Start: 2022-07-23 | End: 2022-10-15

## 2022-08-17 PROBLEM — R73.9 HYPERGLYCEMIA: Status: RESOLVED | Noted: 2020-08-05 | Resolved: 2022-08-17

## 2022-09-02 ENCOUNTER — NURSE TRIAGE (OUTPATIENT)
Dept: INTERNAL MEDICINE CLINIC | Facility: CLINIC | Age: 82
End: 2022-09-02

## 2022-09-02 DIAGNOSIS — E87.6 HYPOKALEMIA: ICD-10-CM

## 2022-09-03 NOTE — TELEPHONE ENCOUNTER
Please review refill protocol failed/ no protocol  Requested Prescriptions   Pending Prescriptions Disp Refills    POTASSIUM CHLORIDE ER 20 MEQ Oral Tab CR [Pharmacy Med Name: POTASSIUM CHLORIDE 20MEQ ER TABLETS] 180 tablet 1     Sig: TAKE 1 TABLET BY MOUTH TWICE DAILY        There is no refill protocol information for this order

## 2022-09-05 RX ORDER — POTASSIUM CHLORIDE 1500 MG/1
1 TABLET, EXTENDED RELEASE ORAL 2 TIMES DAILY
Qty: 180 TABLET | Refills: 1 | Status: SHIPPED | OUTPATIENT
Start: 2022-09-05 | End: 2023-03-09

## 2022-09-06 ENCOUNTER — OFFICE VISIT (OUTPATIENT)
Dept: INTERNAL MEDICINE CLINIC | Facility: CLINIC | Age: 82
End: 2022-09-06
Payer: MEDICARE

## 2022-09-06 ENCOUNTER — HOSPITAL ENCOUNTER (OUTPATIENT)
Dept: ULTRASOUND IMAGING | Facility: HOSPITAL | Age: 82
Discharge: HOME | End: 2022-09-06
Attending: INTERNAL MEDICINE
Payer: MEDICARE

## 2022-09-06 ENCOUNTER — TELEPHONE (OUTPATIENT)
Dept: INTERNAL MEDICINE CLINIC | Facility: CLINIC | Age: 82
End: 2022-09-06

## 2022-09-06 VITALS
RESPIRATION RATE: 17 BRPM | SYSTOLIC BLOOD PRESSURE: 137 MMHG | TEMPERATURE: 98 F | BODY MASS INDEX: 29.09 KG/M2 | OXYGEN SATURATION: 98 % | HEART RATE: 114 BPM | WEIGHT: 170.38 LBS | HEIGHT: 64 IN | DIASTOLIC BLOOD PRESSURE: 83 MMHG

## 2022-09-06 DIAGNOSIS — C73 PAPILLARY THYROID CARCINOMA (HCC): ICD-10-CM

## 2022-09-06 DIAGNOSIS — M54.50 CHRONIC BILATERAL LOW BACK PAIN WITHOUT SCIATICA: ICD-10-CM

## 2022-09-06 DIAGNOSIS — E53.8 B12 DEFICIENCY: ICD-10-CM

## 2022-09-06 DIAGNOSIS — E11.65 UNCONTROLLED TYPE 2 DIABETES MELLITUS WITH HYPERGLYCEMIA (HCC): ICD-10-CM

## 2022-09-06 DIAGNOSIS — E55.9 VITAMIN D DEFICIENCY: ICD-10-CM

## 2022-09-06 DIAGNOSIS — E87.6 HYPOKALEMIA: ICD-10-CM

## 2022-09-06 DIAGNOSIS — E11.9 DIABETES (HCC): ICD-10-CM

## 2022-09-06 DIAGNOSIS — E78.2 MIXED HYPERLIPIDEMIA: Primary | ICD-10-CM

## 2022-09-06 DIAGNOSIS — I10 ESSENTIAL HYPERTENSION: ICD-10-CM

## 2022-09-06 DIAGNOSIS — E04.2 MULTIPLE THYROID NODULES: ICD-10-CM

## 2022-09-06 DIAGNOSIS — G89.29 CHRONIC BILATERAL LOW BACK PAIN WITHOUT SCIATICA: ICD-10-CM

## 2022-09-06 DIAGNOSIS — R10.84 GENERALIZED ABDOMINAL PAIN: ICD-10-CM

## 2022-09-06 DIAGNOSIS — Z71.85 VACCINE COUNSELING: ICD-10-CM

## 2022-09-06 LAB
APPEARANCE: CLEAR
BILIRUBIN: NEGATIVE
GLUCOSE (URINE DIPSTICK): NEGATIVE MG/DL
KETONES (URINE DIPSTICK): NEGATIVE MG/DL
MULTISTIX LOT#: ABNORMAL NUMERIC
NITRITE, URINE: NEGATIVE
OCCULT BLOOD: NEGATIVE
PH, URINE: 7 (ref 4.5–8)
PROTEIN (URINE DIPSTICK): NEGATIVE MG/DL
SPECIFIC GRAVITY: 1.02 (ref 1–1.03)
URINE-COLOR: YELLOW
UROBILINOGEN,SEMI-QN: 0.2 MG/DL (ref 0–1.9)

## 2022-09-06 PROCEDURE — 1125F AMNT PAIN NOTED PAIN PRSNT: CPT | Performed by: INTERNAL MEDICINE

## 2022-09-06 PROCEDURE — 3079F DIAST BP 80-89 MM HG: CPT | Performed by: INTERNAL MEDICINE

## 2022-09-06 PROCEDURE — 3075F SYST BP GE 130 - 139MM HG: CPT | Performed by: INTERNAL MEDICINE

## 2022-09-06 PROCEDURE — 76705 ECHO EXAM OF ABDOMEN: CPT | Performed by: INTERNAL MEDICINE

## 2022-09-06 PROCEDURE — 99215 OFFICE O/P EST HI 40 MIN: CPT | Performed by: INTERNAL MEDICINE

## 2022-09-06 PROCEDURE — 3008F BODY MASS INDEX DOCD: CPT | Performed by: INTERNAL MEDICINE

## 2022-09-06 PROCEDURE — 81003 URINALYSIS AUTO W/O SCOPE: CPT | Performed by: INTERNAL MEDICINE

## 2022-09-06 RX ORDER — OMEPRAZOLE 40 MG/1
40 CAPSULE, DELAYED RELEASE ORAL DAILY
Qty: 30 CAPSULE | Refills: 0 | Status: SHIPPED | OUTPATIENT
Start: 2022-09-06 | End: 2022-09-06

## 2022-09-06 RX ORDER — OMEPRAZOLE 40 MG/1
40 CAPSULE, DELAYED RELEASE ORAL DAILY
Qty: 90 CAPSULE | Refills: 1 | Status: SHIPPED | OUTPATIENT
Start: 2022-09-06

## 2022-09-06 RX ORDER — TIZANIDINE 4 MG/1
4 TABLET ORAL NIGHTLY
Qty: 5 TABLET | Refills: 0 | Status: SHIPPED | OUTPATIENT
Start: 2022-09-06 | End: 2022-09-11

## 2022-09-06 NOTE — TELEPHONE ENCOUNTER
Can do 3 PM September 6, 2022 at [facility] only for this issue. This is an urgent appointment I am squeezing her in between patients who cannot address any other concerns. She is due for full physical.  We will need to schedule her at a later time for that.

## 2022-09-06 NOTE — TELEPHONE ENCOUNTER
spoke to patient she will come today as requested per dr Joanne Monterroso . Please see prior triage template. I am unable to document

## 2022-09-06 NOTE — TELEPHONE ENCOUNTER
Attempted call to patient, no answer, voicemail full. This RN then contacted Dulce Trimble (daughter, on [place]), to confirm appointment at 3 pm today, to focus on stomach pain.

## 2022-09-07 NOTE — TELEPHONE ENCOUNTER
Refill passed per [facility] protocol.      Requested Prescriptions   Pending Prescriptions Disp Refills    OMEPRAZOLE 40 MG Oral Capsule Delayed Release [Pharmacy Med Name: OMEPRAZOLE 40MG CAPSULES] 90 capsule 0     Sig: TAKE 1 CAPSULE(40 MG) BY MOUTH DAILY        Gastrointestional Medication Protocol Passed - 9/6/2022 10:26 PM        Passed - In person appointment or virtual visit in the past 12 mos or appointment in next 3 mos       Recent Outpatient Visits              Today Mixed hyperlipidemia    Irina Vizcarra MD    Office Visit    6 months ago Stage 3b chronic kidney disease [facility])    [facility], [address], Álvaro Vega MD    Office Visit    1 year ago Vitamin D deficiency    Álvaro Nicolas MD    Office Visit    1 year ago Essential hypertension    [address] [facility], APRN    Virtual Phone E/M    1 year ago Acute systolic CHF (congestive heart failure), NYHA class 1 [facility])    [facility], Iggy Plummer MD    Office Visit                             Recent Outpatient Visits              Today Mixed hyperlipidemia    Álvaro Nicolas MD    Office Visit    6 months ago Stage 3b chronic kidney disease [facility])    [facility], [address], Álvaro Vega MD    Office Visit    1 year ago Vitamin D deficiency    Álvaro Nicolas MD    Office Visit    1 year ago Essential hypertension    [address] [facility], APRN    Virtual Phone E/M    1 year ago Acute systolic CHF (congestive heart failure), NYHA class 1 [facility])    [facility], Iggy Plummer MD    Office Visit

## 2022-09-13 LAB
ALBUMIN/GLOBULIN RATIO: 1.3 (CALC) (ref 1–2.5)
ALBUMIN: 3.8 G/DL (ref 3.6–5.1)
ALKALINE PHOSPHATASE: 140 U/L (ref 37–153)
ALT: 17 U/L (ref 6–29)
AMYLASE: 41 U/L (ref 21–101)
AST: 18 U/L (ref 10–35)
BILIRUBIN, TOTAL: 0.6 MG/DL (ref 0.2–1.2)
BUN: 11 MG/DL (ref 7–25)
CALCIUM: 9.8 MG/DL (ref 8.6–10.4)
CARBON DIOXIDE: 27 MMOL/L (ref 20–32)
CHLORIDE: 104 MMOL/L (ref 98–110)
CHOL/HDLC RATIO: 3 (CALC)
CHOLESTEROL, TOTAL: 149 MG/DL
CREATININE: 1 MG/DL (ref 0.6–1)
EGFR: 57 ML/MIN/1.73M2
GLOBULIN: 2.9 G/DL (CALC) (ref 1.9–3.7)
GLUCOSE: 96 MG/DL (ref 65–99)
HDL CHOLESTEROL: 50 MG/DL
HEMOGLOBIN A1C: 6.1 % OF TOTAL HGB
LDL-CHOLESTEROL: 76 MG/DL (CALC)
LIPASE: 10 U/L (ref 7–60)
NON-HDL CHOLESTEROL: 99 MG/DL (CALC)
POTASSIUM: 4.2 MMOL/L (ref 3.5–5.3)
PROTEIN, TOTAL: 6.7 G/DL (ref 6.1–8.1)
SODIUM: 140 MMOL/L (ref 135–146)
T4, FREE: 1.5 NG/DL (ref 0.8–1.8)
THYROGLOBULIN ANTIBODIES: <1 IU/ML
THYROID PEROXIDASE$ANTIBODIES: 1 IU/ML
TRIGLYCERIDES: 142 MG/DL
TSH W/REFLEX TO FT4: 9.39 MIU/L (ref 0.4–4.5)
VITAMIN B12: 585 PG/ML (ref 200–1100)

## 2022-09-26 ENCOUNTER — TELEPHONE (OUTPATIENT)
Dept: INTERNAL MEDICINE CLINIC | Facility: CLINIC | Age: 82
End: 2022-09-26

## 2022-09-26 RX ORDER — SACUBITRIL AND VALSARTAN 49; 51 MG/1; MG/1
1 TABLET, FILM COATED ORAL 2 TIMES DAILY
COMMUNITY
Start: 2022-09-02

## 2022-09-26 RX ORDER — ATORVASTATIN CALCIUM 40 MG/1
40 TABLET, FILM COATED ORAL NIGHTLY
Qty: 90 TABLET | Refills: 1 | Status: SHIPPED | OUTPATIENT
Start: 2022-09-26

## 2022-09-26 NOTE — TELEPHONE ENCOUNTER
Spoke to patient for medication adherence consult. Patient overdue for refill on atorvastatin per insurance report. Atorvastatin last filled 6/14/22 x 90 day supply. Patient reports that she needs a refill on her atorvastatin. Will pend new order for atorvastatin 40 mg for PCP to review and sign. Patient states that she is tolerating her medication well. Provided education on the importance of adherence. Patient denies any questions or concerns with medications at this time.

## 2022-10-12 LAB
CREATININE, RANDOM URINE: 132 MG/DL (ref 20–275)
MICROALBUMIN/CREATININE RATIO, RANDOM URINE: 7 MCG/MG CREAT
MICROALBUMIN: 0.9 MG/DL

## 2022-10-15 RX ORDER — ALLOPURINOL 300 MG/1
300 TABLET ORAL DAILY
Qty: 90 TABLET | Refills: 1 | Status: SHIPPED | OUTPATIENT
Start: 2022-10-15 | End: 2023-04-21

## 2022-10-16 PROBLEM — I44.7 LEFT BUNDLE BRANCH BLOCK (LBBB): Status: ACTIVE | Noted: 2020-05-29

## 2022-11-09 ENCOUNTER — TELEPHONE (OUTPATIENT)
Dept: INTERNAL MEDICINE CLINIC | Facility: CLINIC | Age: 82
End: 2022-11-09

## 2022-12-21 ENCOUNTER — TELEPHONE (OUTPATIENT)
Dept: INTERNAL MEDICINE CLINIC | Facility: CLINIC | Age: 82
End: 2022-12-21

## 2023-01-19 RX ORDER — ATORVASTATIN CALCIUM 40 MG/1
40 TABLET, FILM COATED ORAL NIGHTLY
Qty: 90 TABLET | Refills: 1 | Status: SHIPPED | OUTPATIENT
Start: 2023-01-19

## 2023-01-19 NOTE — TELEPHONE ENCOUNTER
Refill passed per [employer] protocol.    Requested Prescriptions   Pending Prescriptions Disp Refills    ATORVASTATIN 40 MG Oral Tab [Pharmacy Med Name: ATORVASTATIN 40MG TABLETS] 90 tablet 1     Sig: TAKE 1 TABLET(40 MG) BY MOUTH EVERY NIGHT       Cholesterol Medication Protocol Passed - 1/19/2023  5:51 AM        Passed - ALT in past 12 months        Passed - LDL in past 12 months        Passed - Last ALT < 80     Lab Results   Component Value Date    ALT 17 09/12/2022             Passed - Last LDL < 130     Lab Results   Component Value Date    LDL 76 09/12/2022             Passed - In person appointment or virtual visit in the past 12 mos or appointment in next 3 mos     Recent Outpatient Visits              4 months ago Mixed hyperlipidemia    Lazaro Hernandez MD    Office Visit    10 months ago Stage 3b chronic kidney disease [facility])    Aries Dickerson MD    Office Visit    1 year ago Vitamin D deficiency    Aries Dickerson MD    Office Visit    2 years ago Essential hypertension    [address] [facility], [facility]    Virtual Phone E/M    2 years ago Acute systolic CHF (congestive heart failure), NYHA class 1 ([address]    [address], [facility] MD    Office Visit          Future Appointments         Provider Department Appt Notes    In 3 months Loan Napoles  [place], [address] [facility], last px 0/02/21,FANDOMFL of policy  declinded appt with n.p.                     Recent Outpatient Visits              4 months ago Mixed hyperlipidemia    Aries Dickerson MD    Office Visit    10 months ago Stage 3b chronic kidney disease [facility])    [address], [address], Dot Craft MD    Office Visit    1 year ago Vitamin D deficiency    David Terry, Dot Craft MD    Office Visit    2 years ago Essential hypertension    [address] [facility], [facility]    Virtual Phone E/M    2 years ago Acute systolic CHF (congestive heart failure), NYHA class 1 [facility])    [address], [address] [facility], [facility] MD Zohra    Office Visit            Future Appointments         Provider Department Appt Notes    In 3 months Miles Lopez MD [address], [address], [facility] supervisit, last px 3/78/18,LFUPJRGT of policy  declinded appt with n.p.

## 2023-03-14 NOTE — ADDENDUM NOTE
Addended by: Blaine Jade on: 6/3/2021 10:52 AM     Modules accepted: Orders Hepatology consulted as above- Dr. Wells   S/p TIPS downsizing 03/13   Continue lactulose and rifaximin   Continue home lasix (spironolactone stopped due to hyperkalemia on admission)   - Per hepatology, given pt age and comorbidities, he is not a candidate for transplant at this time. If pt clinical status does not improve with TIPS downsizing, monitor and consider hospice  - Follow up with Dr. Goodman within 1 week

## 2023-04-21 RX ORDER — ALLOPURINOL 300 MG/1
300 TABLET ORAL DAILY
Qty: 90 TABLET | Refills: 1 | Status: SHIPPED | OUTPATIENT
Start: 2023-04-21

## 2023-04-25 ENCOUNTER — OFFICE VISIT (OUTPATIENT)
Dept: INTERNAL MEDICINE CLINIC | Facility: CLINIC | Age: 83
End: 2023-04-25

## 2023-04-25 VITALS
RESPIRATION RATE: 16 BRPM | DIASTOLIC BLOOD PRESSURE: 70 MMHG | HEART RATE: 97 BPM | WEIGHT: 178 LBS | SYSTOLIC BLOOD PRESSURE: 140 MMHG | BODY MASS INDEX: 30.39 KG/M2 | OXYGEN SATURATION: 100 % | HEIGHT: 64 IN | TEMPERATURE: 97 F

## 2023-04-25 DIAGNOSIS — I42.8 NONISCHEMIC CARDIOMYOPATHY (HCC): ICD-10-CM

## 2023-04-25 DIAGNOSIS — E04.2 MULTIPLE THYROID NODULES: ICD-10-CM

## 2023-04-25 DIAGNOSIS — Z95.0 S/P BIVENTRICULAR CARDIAC PACEMAKER PROCEDURE: ICD-10-CM

## 2023-04-25 DIAGNOSIS — M54.31 SCIATICA OF RIGHT SIDE: ICD-10-CM

## 2023-04-25 DIAGNOSIS — E78.2 MIXED HYPERLIPIDEMIA: ICD-10-CM

## 2023-04-25 DIAGNOSIS — E53.8 B12 DEFICIENCY: ICD-10-CM

## 2023-04-25 DIAGNOSIS — Z00.00 ENCOUNTER FOR ANNUAL HEALTH EXAMINATION: ICD-10-CM

## 2023-04-25 DIAGNOSIS — G43.009 MIGRAINE WITHOUT AURA AND WITHOUT STATUS MIGRAINOSUS, NOT INTRACTABLE: ICD-10-CM

## 2023-04-25 DIAGNOSIS — Z45.02 IMPLANTABLE DEFIBRILLATOR REPROGRAMMING/CHECK: ICD-10-CM

## 2023-04-25 DIAGNOSIS — I34.0 NONRHEUMATIC MITRAL VALVE REGURGITATION: ICD-10-CM

## 2023-04-25 DIAGNOSIS — I10 ESSENTIAL HYPERTENSION: ICD-10-CM

## 2023-04-25 DIAGNOSIS — R74.8 ELEVATED ALKALINE PHOSPHATASE MEASUREMENT: ICD-10-CM

## 2023-04-25 DIAGNOSIS — E11.65 UNCONTROLLED TYPE 2 DIABETES MELLITUS WITH HYPERGLYCEMIA (HCC): ICD-10-CM

## 2023-04-25 DIAGNOSIS — I27.20 PULMONARY HYPERTENSION (HCC): ICD-10-CM

## 2023-04-25 DIAGNOSIS — R82.90 ABNORMAL URINE: ICD-10-CM

## 2023-04-25 DIAGNOSIS — I35.9 AORTIC VALVE DISORDER: ICD-10-CM

## 2023-04-25 DIAGNOSIS — N18.32 STAGE 3B CHRONIC KIDNEY DISEASE (HCC): Primary | Chronic | ICD-10-CM

## 2023-04-25 DIAGNOSIS — I44.7 LEFT BUNDLE BRANCH BLOCK (LBBB) ON ELECTROCARDIOGRAM: ICD-10-CM

## 2023-04-25 DIAGNOSIS — M10.00 ACUTE IDIOPATHIC GOUT, UNSPECIFIED SITE: ICD-10-CM

## 2023-04-25 DIAGNOSIS — E55.9 VITAMIN D DEFICIENCY: ICD-10-CM

## 2023-04-25 DIAGNOSIS — K59.01 SLOW TRANSIT CONSTIPATION: ICD-10-CM

## 2023-04-25 DIAGNOSIS — I42.0 DILATED CARDIOMYOPATHY (HCC): ICD-10-CM

## 2023-04-25 DIAGNOSIS — M51.37 DEGENERATION OF LUMBAR OR LUMBOSACRAL INTERVERTEBRAL DISC: ICD-10-CM

## 2023-04-25 DIAGNOSIS — I50.21 ACUTE SYSTOLIC CHF (CONGESTIVE HEART FAILURE), NYHA CLASS 1 (HCC): ICD-10-CM

## 2023-04-25 DIAGNOSIS — G47.09 OTHER INSOMNIA: ICD-10-CM

## 2023-04-25 DIAGNOSIS — E87.6 HYPOKALEMIA: ICD-10-CM

## 2023-04-25 DIAGNOSIS — R76.8 ANA POSITIVE: ICD-10-CM

## 2023-04-25 DIAGNOSIS — C73 PAPILLARY THYROID CARCINOMA (HCC): ICD-10-CM

## 2023-04-25 DIAGNOSIS — Z71.85 VACCINE COUNSELING: ICD-10-CM

## 2023-04-25 DIAGNOSIS — I44.7 LEFT BUNDLE BRANCH BLOCK (LBBB): ICD-10-CM

## 2023-04-25 LAB
AMYLASE SERPL-CCNC: 97 U/L (ref 25–115)
APPEARANCE: CLEAR
BILIRUB UR QL: NEGATIVE
BILIRUBIN: NEGATIVE
COLOR UR: YELLOW
GLUCOSE (URINE DIPSTICK): NEGATIVE MG/DL
GLUCOSE UR-MCNC: NORMAL MG/DL
HGB UR QL STRIP.AUTO: NEGATIVE
KETONES (URINE DIPSTICK): NEGATIVE MG/DL
KETONES UR-MCNC: NEGATIVE MG/DL
LEUKOCYTE ESTERASE UR QL STRIP.AUTO: 500
LIPASE SERPL-CCNC: 19 U/L (ref 13–75)
MULTISTIX LOT#: ABNORMAL NUMERIC
NITRITE UR QL STRIP.AUTO: NEGATIVE
NITRITE, URINE: NEGATIVE
OCCULT BLOOD: NEGATIVE
PH UR: 6 [PH] (ref 5–8)
PH, URINE: 6 (ref 4.5–8)
PROTEIN (URINE DIPSTICK): NEGATIVE MG/DL
SP GR UR STRIP: 1.02 (ref 1–1.03)
SPECIFIC GRAVITY: 1.02 (ref 1–1.03)
URINE-COLOR: YELLOW
UROBILINOGEN UR STRIP-ACNC: 2
UROBILINOGEN,SEMI-QN: 2 MG/DL (ref 0–1.9)
VIT D+METAB SERPL-MCNC: 16.5 NG/ML (ref 30–100)

## 2023-04-25 PROCEDURE — 3008F BODY MASS INDEX DOCD: CPT | Performed by: INTERNAL MEDICINE

## 2023-04-25 PROCEDURE — 96160 PT-FOCUSED HLTH RISK ASSMT: CPT | Performed by: INTERNAL MEDICINE

## 2023-04-25 PROCEDURE — 36415 COLL VENOUS BLD VENIPUNCTURE: CPT | Performed by: INTERNAL MEDICINE

## 2023-04-25 PROCEDURE — 81003 URINALYSIS AUTO W/O SCOPE: CPT | Performed by: INTERNAL MEDICINE

## 2023-04-25 PROCEDURE — G0439 PPPS, SUBSEQ VISIT: HCPCS | Performed by: INTERNAL MEDICINE

## 2023-04-25 PROCEDURE — 99214 OFFICE O/P EST MOD 30 MIN: CPT | Performed by: INTERNAL MEDICINE

## 2023-04-25 PROCEDURE — 3077F SYST BP >= 140 MM HG: CPT | Performed by: INTERNAL MEDICINE

## 2023-04-25 PROCEDURE — 1126F AMNT PAIN NOTED NONE PRSNT: CPT | Performed by: INTERNAL MEDICINE

## 2023-04-25 PROCEDURE — 3078F DIAST BP <80 MM HG: CPT | Performed by: INTERNAL MEDICINE

## 2023-04-25 RX ORDER — TIZANIDINE 4 MG/1
4 TABLET ORAL NIGHTLY
Qty: 30 TABLET | Refills: 1 | Status: SHIPPED | OUTPATIENT
Start: 2023-04-25 | End: 2023-04-30

## 2023-04-25 RX ORDER — METOPROLOL SUCCINATE 50 MG/1
100 TABLET, EXTENDED RELEASE ORAL 2 TIMES DAILY
Qty: 360 TABLET | Refills: 1 | Status: SHIPPED | OUTPATIENT
Start: 2023-04-25

## 2023-05-05 ENCOUNTER — TELEPHONE (OUTPATIENT)
Dept: INTERNAL MEDICINE CLINIC | Facility: CLINIC | Age: 83
End: 2023-05-05

## 2023-05-05 NOTE — TELEPHONE ENCOUNTER
Patient called to clarify medication change from last office visit. Patient advised of medication changes and list. Patient verbalized understanding.           Medication Changes       tiZANidine HCl 4 mg Oral Nightly     Metoprolol Succinate 100 mg Oral 2 times daily   Medication List          Medication List  As of 4/26/2023 10:08 AM     Allopurinol 300 mg Oral Daily     Aspirin 81 mg Oral Daily     Atorvastatin Calcium 40 mg Oral Nightly     Cholecalciferol 2,000 Units Oral Daily     Cyanocobalamin        100 mcg Oral Daily     1,000 mcg Intramuscular Once     1,000 mcg Intramuscular Once     Fluticasone Propionate 50 MCG/ACT 1 spray Each Nare 2 times daily     Levothyroxine Sodium        137 mcg Oral Before breakfast     125 MCG Tabs, TAKE 1 TABLET(125 MCG) BY MOUTH BEFORE BREAKFAST     Meclizine HCl 25 mg Oral 3 times daily PRN     Metoprolol Succinate 100 mg Oral 2 times daily     Omeprazole 40 mg Oral Daily     Potassium Chloride Yun ER 20 MEQ 20 mEq Oral 2 times daily     Sacubitril-Valsartan 49-51 MG 1 tablet Oral 2 times daily     Temazepam        30 mg Oral Nightly PRN     30 mg Oral Nightly PRN     tiZANidine HCl 4 mg Oral Nightly

## 2023-05-12 ENCOUNTER — TELEPHONE (OUTPATIENT)
Dept: INTERNAL MEDICINE CLINIC | Facility: CLINIC | Age: 83
End: 2023-05-12

## 2023-05-12 NOTE — TELEPHONE ENCOUNTER
Pt states that she forgot to schedule an appointment when she was last seen in the office. Patient, states that Dr. Arun Dawson wanted to see her back in the office in a month. There are no available appointments. Would the doctor like to add the patient to the schedule? If so which date and time?

## 2023-06-05 ENCOUNTER — TELEPHONE (OUTPATIENT)
Dept: INTERNAL MEDICINE CLINIC | Facility: CLINIC | Age: 83
End: 2023-06-05

## 2023-06-05 ENCOUNTER — HOSPITAL ENCOUNTER (EMERGENCY)
Facility: HOSPITAL | Age: 83
Discharge: HOME | End: 2023-06-05
Attending: EMERGENCY MEDICINE
Payer: MEDICARE

## 2023-06-05 ENCOUNTER — APPOINTMENT (OUTPATIENT)
Dept: GENERAL RADIOLOGY | Facility: HOSPITAL | Age: 83
End: 2023-06-05
Attending: EMERGENCY MEDICINE
Payer: MEDICARE

## 2023-06-05 VITALS
DIASTOLIC BLOOD PRESSURE: 80 MMHG | SYSTOLIC BLOOD PRESSURE: 134 MMHG | HEIGHT: 64 IN | RESPIRATION RATE: 16 BRPM | WEIGHT: 174 LBS | OXYGEN SATURATION: 98 % | TEMPERATURE: 98 F | BODY MASS INDEX: 29.71 KG/M2 | HEART RATE: 115 BPM

## 2023-06-05 DIAGNOSIS — J18.9 COMMUNITY ACQUIRED PNEUMONIA OF RIGHT LOWER LOBE OF LUNG: Primary | ICD-10-CM

## 2023-06-05 LAB
ANION GAP SERPL CALC-SCNC: 7 MMOL/L (ref 0–18)
BASOPHILS # BLD AUTO: 0.03 X10(3) UL (ref 0–0.2)
BASOPHILS NFR BLD AUTO: 0.2 %
BUN BLD-MCNC: 15 MG/DL (ref 7–18)
BUN/CREAT SERPL: 13.3 (ref 10–20)
CALCIUM BLD-MCNC: 9.1 MG/DL (ref 8.5–10.1)
CHLORIDE SERPL-SCNC: 111 MMOL/L (ref 98–112)
CO2 SERPL-SCNC: 25 MMOL/L (ref 21–32)
CREAT BLD-MCNC: 1.13 MG/DL
DEPRECATED RDW RBC AUTO: 52.8 FL (ref 35.1–46.3)
EOSINOPHIL # BLD AUTO: 0.21 X10(3) UL (ref 0–0.7)
EOSINOPHIL NFR BLD AUTO: 1.6 %
ERYTHROCYTE [DISTWIDTH] IN BLOOD BY AUTOMATED COUNT: 15.9 % (ref 11–15)
GFR SERPLBLD BASED ON 1.73 SQ M-ARVRAT: 49 ML/MIN/1.73M2 (ref 60–?)
GLUCOSE BLD-MCNC: 146 MG/DL (ref 70–99)
HCT VFR BLD AUTO: 42.7 %
HGB BLD-MCNC: 13.5 G/DL
IMM GRANULOCYTES # BLD AUTO: 0.05 X10(3) UL (ref 0–1)
IMM GRANULOCYTES NFR BLD: 0.4 %
LYMPHOCYTES # BLD AUTO: 1.88 X10(3) UL (ref 1–4)
LYMPHOCYTES NFR BLD AUTO: 14.6 %
MCH RBC QN AUTO: 28.8 PG (ref 26–34)
MCHC RBC AUTO-ENTMCNC: 31.6 G/DL (ref 31–37)
MCV RBC AUTO: 91 FL
MONOCYTES # BLD AUTO: 0.88 X10(3) UL (ref 0.1–1)
MONOCYTES NFR BLD AUTO: 6.8 %
NEUTROPHILS # BLD AUTO: 9.84 X10 (3) UL (ref 1.5–7.7)
NEUTROPHILS # BLD AUTO: 9.84 X10(3) UL (ref 1.5–7.7)
NEUTROPHILS NFR BLD AUTO: 76.4 %
OSMOLALITY SERPL CALC.SUM OF ELEC: 299 MOSM/KG (ref 275–295)
PLATELET # BLD AUTO: 176 10(3)UL (ref 150–450)
POTASSIUM SERPL-SCNC: 5.3 MMOL/L (ref 3.5–5.1)
RBC # BLD AUTO: 4.69 X10(6)UL
SODIUM SERPL-SCNC: 143 MMOL/L (ref 136–145)
WBC # BLD AUTO: 12.9 X10(3) UL (ref 4–11)

## 2023-06-05 PROCEDURE — 85025 COMPLETE CBC W/AUTO DIFF WBC: CPT

## 2023-06-05 PROCEDURE — 96360 HYDRATION IV INFUSION INIT: CPT

## 2023-06-05 PROCEDURE — 71045 X-RAY EXAM CHEST 1 VIEW: CPT | Performed by: EMERGENCY MEDICINE

## 2023-06-05 PROCEDURE — 94640 AIRWAY INHALATION TREATMENT: CPT

## 2023-06-05 PROCEDURE — 80048 BASIC METABOLIC PNL TOTAL CA: CPT

## 2023-06-05 PROCEDURE — 80048 BASIC METABOLIC PNL TOTAL CA: CPT | Performed by: EMERGENCY MEDICINE

## 2023-06-05 PROCEDURE — 99284 EMERGENCY DEPT VISIT MOD MDM: CPT

## 2023-06-05 PROCEDURE — 85025 COMPLETE CBC W/AUTO DIFF WBC: CPT | Performed by: EMERGENCY MEDICINE

## 2023-06-05 RX ORDER — AMOXICILLIN AND CLAVULANATE POTASSIUM 875; 125 MG/1; MG/1
1 TABLET, FILM COATED ORAL 2 TIMES DAILY
Qty: 20 TABLET | Refills: 0 | Status: SHIPPED | OUTPATIENT
Start: 2023-06-05 | End: 2023-06-15

## 2023-06-05 RX ORDER — DOXYCYCLINE HYCLATE 100 MG/1
100 CAPSULE ORAL 2 TIMES DAILY
Qty: 14 CAPSULE | Refills: 0 | Status: SHIPPED | OUTPATIENT
Start: 2023-06-05 | End: 2023-06-12

## 2023-06-05 RX ORDER — ACETAMINOPHEN 500 MG
1000 TABLET ORAL ONCE
Status: COMPLETED | OUTPATIENT
Start: 2023-06-05 | End: 2023-06-05

## 2023-06-05 RX ORDER — IPRATROPIUM BROMIDE AND ALBUTEROL SULFATE 2.5; .5 MG/3ML; MG/3ML
3 SOLUTION RESPIRATORY (INHALATION) ONCE
Status: COMPLETED | OUTPATIENT
Start: 2023-06-05 | End: 2023-06-05

## 2023-06-05 NOTE — ED INITIAL ASSESSMENT (HPI)
CEDRICK since Friday and this morning says it has gotten worse with a sore throat and fever. Denies any other pain.

## 2023-06-05 NOTE — TELEPHONE ENCOUNTER
Page was forwarded ar 7 [phone]  Page returned at 65  Dr Pollo Steven left in the AM   No messages from ED

## 2023-06-06 ENCOUNTER — PATIENT OUTREACH (OUTPATIENT)
Dept: CASE MANAGEMENT | Age: 83
End: 2023-06-06

## 2023-06-06 NOTE — PROGRESS NOTES
1st attempt ED f/up apt request    Teresa Thompson  [facility]  [address]   [phone]  Apt: July 14 @9:30am     Confirmed w/ pt  Closing encounter

## 2023-06-07 NOTE — TELEPHONE ENCOUNTER
Called patient (name and  verified) for an update. Patient states she is feeling much better today than yestserday. Is continuing to take her medications as prescribed. Patient had no questions or concerns at this time.

## 2023-06-22 ENCOUNTER — MED REC SCAN ONLY (OUTPATIENT)
Dept: INTERNAL MEDICINE CLINIC | Facility: CLINIC | Age: 83
End: 2023-06-22

## 2023-06-22 ENCOUNTER — TELEPHONE (OUTPATIENT)
Dept: INTERNAL MEDICINE CLINIC | Facility: CLINIC | Age: 83
End: 2023-06-22

## 2023-06-22 DIAGNOSIS — I73.9 PERIPHERAL VASCULAR DISEASE (HCC): Primary | ICD-10-CM

## 2023-07-14 ENCOUNTER — OFFICE VISIT (OUTPATIENT)
Dept: INTERNAL MEDICINE CLINIC | Facility: CLINIC | Age: 83
End: 2023-07-14

## 2023-07-14 VITALS
HEART RATE: 107 BPM | TEMPERATURE: 97 F | RESPIRATION RATE: 20 BRPM | OXYGEN SATURATION: 100 % | DIASTOLIC BLOOD PRESSURE: 72 MMHG | SYSTOLIC BLOOD PRESSURE: 117 MMHG | BODY MASS INDEX: 29.02 KG/M2 | WEIGHT: 170 LBS | HEIGHT: 64 IN

## 2023-07-14 DIAGNOSIS — E53.8 B12 DEFICIENCY: ICD-10-CM

## 2023-07-14 DIAGNOSIS — E55.9 VITAMIN D DEFICIENCY: ICD-10-CM

## 2023-07-14 DIAGNOSIS — E78.2 MIXED HYPERLIPIDEMIA: ICD-10-CM

## 2023-07-14 DIAGNOSIS — I10 ESSENTIAL HYPERTENSION: Primary | ICD-10-CM

## 2023-07-14 DIAGNOSIS — E04.2 MULTIPLE THYROID NODULES: ICD-10-CM

## 2023-07-14 DIAGNOSIS — R74.8 ELEVATED ALKALINE PHOSPHATASE MEASUREMENT: ICD-10-CM

## 2023-07-14 DIAGNOSIS — Z71.85 VACCINE COUNSELING: ICD-10-CM

## 2023-07-14 DIAGNOSIS — E11.65 UNCONTROLLED TYPE 2 DIABETES MELLITUS WITH HYPERGLYCEMIA (HCC): ICD-10-CM

## 2023-07-14 DIAGNOSIS — C73 PAPILLARY THYROID CARCINOMA (HCC): ICD-10-CM

## 2023-07-14 PROCEDURE — 3074F SYST BP LT 130 MM HG: CPT | Performed by: INTERNAL MEDICINE

## 2023-07-14 PROCEDURE — 1159F MED LIST DOCD IN RCRD: CPT | Performed by: INTERNAL MEDICINE

## 2023-07-14 PROCEDURE — 3008F BODY MASS INDEX DOCD: CPT | Performed by: INTERNAL MEDICINE

## 2023-07-14 PROCEDURE — 1160F RVW MEDS BY RX/DR IN RCRD: CPT | Performed by: INTERNAL MEDICINE

## 2023-07-14 PROCEDURE — 1111F DSCHRG MED/CURRENT MED MERGE: CPT | Performed by: INTERNAL MEDICINE

## 2023-07-14 PROCEDURE — 3078F DIAST BP <80 MM HG: CPT | Performed by: INTERNAL MEDICINE

## 2023-07-14 PROCEDURE — 99214 OFFICE O/P EST MOD 30 MIN: CPT | Performed by: INTERNAL MEDICINE

## 2023-07-14 NOTE — PATIENT INSTRUCTIONS
ASSESSMENT/PLAN:   Essential hypertension  (primary encounter diagnosis) Stable. Careful with diet and excercise at least 30 minutes 3-4 times a week. Check blood pressures at different times on different days. Can purchase own blood pressure monitor. If not, check at local pharmacy. Bake foods more and grill occasionally. Avoid fried foods. No salt. Use other seasonings. Multiple thyroid nodules Stable. Check US and blood. Vitamin d deficiency Check blood. Vaccine counseling Up to date. B12 deficiency Check blood. Mixed hyperlipidemia Check blood. Elevated alkaline phosphatase measurement Check blood. Uncontrolled type 2 diabetes mellitus with hyperglycemia (hcc) ? Control. Check blood. Careful with diet and excercise at least 30 minutes 3-4 times a week. Check sugars at different times on different dates. Careful with low sugars. Carry something with you and check sugar if can. Can carry christel cracker, etc. Decrease carbohydrates. But also, careful with fruits and natural sugars. One serving a day and no more than 1 handful every day. Check feet  every AM and careful with sores and ulcers on feet bilaterally. Check eyes every year with dilated eye exam.  Check sugars. 2-hour postmeal should be less than 140s. Pre-meal should be 's. Both equally affected A1c. Discussed importance of glycemic control to prevent complications of diabetes  -Discussed complications of diabetes include retinopathy, neuropathy, nephropathy and cardiovascular disease  -Discussed ABCs of DM  -Discussed importance of SBGM  -Discussed importance of low CHO diet, recommend 45gm per meal or 135gm per day  -Normal foot exam  -FU with optho  -Normotensive     Papillary thyroid carcinoma (hcc) Check US and blood. CAP/Cough/SOB. Improved. Reviewed ER reports myself.      Orders Placed This Encounter      Thyroglobulin Antibody and Tumor Marker [E]      Hemoglobin A1C      Vitamin D, 25-Hydroxy      Meds This Visit:  Requested Prescriptions      No prescriptions requested or ordered in this encounter       Imaging & Referrals:  XR DEXA BONE DENSITOMETRY (CPT=77080)      Has set appt for FU 8-23.    FU after 4-25-24 for medicare physical.

## 2023-07-17 ENCOUNTER — LAB ENCOUNTER (OUTPATIENT)
Dept: LAB | Facility: HOSPITAL | Age: 83
End: 2023-07-17
Attending: INTERNAL MEDICINE
Payer: MEDICARE

## 2023-07-17 LAB
EST. AVERAGE GLUCOSE BLD GHB EST-MCNC: 128 MG/DL (ref 68–126)
HBA1C MFR BLD: 6.1 % (ref ?–5.7)
VIT D+METAB SERPL-MCNC: 21.6 NG/ML (ref 30–100)

## 2023-07-17 PROCEDURE — 82306 VITAMIN D 25 HYDROXY: CPT | Performed by: INTERNAL MEDICINE

## 2023-07-17 PROCEDURE — 86800 THYROGLOBULIN ANTIBODY: CPT | Performed by: INTERNAL MEDICINE

## 2023-07-17 PROCEDURE — 84443 ASSAY THYROID STIM HORMONE: CPT | Performed by: INTERNAL MEDICINE

## 2023-07-17 PROCEDURE — 36415 COLL VENOUS BLD VENIPUNCTURE: CPT | Performed by: INTERNAL MEDICINE

## 2023-07-17 PROCEDURE — 83036 HEMOGLOBIN GLYCOSYLATED A1C: CPT | Performed by: INTERNAL MEDICINE

## 2023-07-17 PROCEDURE — 84439 ASSAY OF FREE THYROXINE: CPT | Performed by: INTERNAL MEDICINE

## 2023-07-17 NOTE — TELEPHONE ENCOUNTER
Refill passed per [facility] [place] protocol    Requested Prescriptions   Pending Prescriptions Disp Refills    ATORVASTATIN 40 MG Oral Tab [Pharmacy Med Name: ATORVASTATIN 40MG TABLETS] 90 tablet 1     Sig: TAKE 1 TABLET(40 MG) BY MOUTH EVERY NIGHT        C show

## 2023-07-18 LAB
THYROGLOB AB: <1 IU/ML
THYROGLOB IMA: 0.1 NG/ML

## 2023-09-11 ENCOUNTER — MED REC SCAN ONLY (OUTPATIENT)
Dept: INTERNAL MEDICINE CLINIC | Facility: CLINIC | Age: 83
End: 2023-09-11

## 2023-09-20 ENCOUNTER — NURSE ONLY (OUTPATIENT)
Dept: LAB | Facility: HOSPITAL | Age: 83
End: 2023-09-20
Attending: INTERNAL MEDICINE
Payer: MEDICARE

## 2023-09-20 DIAGNOSIS — Z01.818 PRE-OP TESTING: ICD-10-CM

## 2023-09-20 DIAGNOSIS — I42.8 CARDIOMYOPATHY, NONISCHEMIC (HCC): Primary | ICD-10-CM

## 2023-09-20 LAB
ANION GAP SERPL CALC-SCNC: 5 MMOL/L (ref 0–18)
BASOPHILS # BLD AUTO: 0.02 X10(3) UL (ref 0–0.2)
BASOPHILS NFR BLD AUTO: 0.4 %
BUN BLD-MCNC: 21 MG/DL (ref 7–18)
BUN/CREAT SERPL: 21.9 (ref 10–20)
CALCIUM BLD-MCNC: 9.4 MG/DL (ref 8.5–10.1)
CHLORIDE SERPL-SCNC: 108 MMOL/L (ref 98–112)
CO2 SERPL-SCNC: 28 MMOL/L (ref 21–32)
CREAT BLD-MCNC: 0.96 MG/DL
DEPRECATED RDW RBC AUTO: 45.8 FL (ref 35.1–46.3)
EGFRCR SERPLBLD CKD-EPI 2021: 60 ML/MIN/1.73M2 (ref 60–?)
EOSINOPHIL # BLD AUTO: 0.14 X10(3) UL (ref 0–0.7)
EOSINOPHIL NFR BLD AUTO: 2.7 %
ERYTHROCYTE [DISTWIDTH] IN BLOOD BY AUTOMATED COUNT: 14.5 % (ref 11–15)
FASTING STATUS PATIENT QL REPORTED: YES
GLUCOSE BLD-MCNC: 90 MG/DL (ref 70–99)
HCT VFR BLD AUTO: 41.5 %
HGB BLD-MCNC: 13.4 G/DL
IMM GRANULOCYTES # BLD AUTO: 0.01 X10(3) UL (ref 0–1)
IMM GRANULOCYTES NFR BLD: 0.2 %
INR BLD: 1.02 (ref 0.85–1.16)
LYMPHOCYTES # BLD AUTO: 1.57 X10(3) UL (ref 1–4)
LYMPHOCYTES NFR BLD AUTO: 30.4 %
MCH RBC QN AUTO: 28.6 PG (ref 26–34)
MCHC RBC AUTO-ENTMCNC: 32.3 G/DL (ref 31–37)
MCV RBC AUTO: 88.5 FL
MONOCYTES # BLD AUTO: 0.4 X10(3) UL (ref 0.1–1)
MONOCYTES NFR BLD AUTO: 7.8 %
NEUTROPHILS # BLD AUTO: 3.02 X10 (3) UL (ref 1.5–7.7)
NEUTROPHILS # BLD AUTO: 3.02 X10(3) UL (ref 1.5–7.7)
NEUTROPHILS NFR BLD AUTO: 58.5 %
OSMOLALITY SERPL CALC.SUM OF ELEC: 295 MOSM/KG (ref 275–295)
PLATELET # BLD AUTO: 184 10(3)UL (ref 150–450)
POTASSIUM SERPL-SCNC: 4.6 MMOL/L (ref 3.5–5.1)
PROTHROMBIN TIME: 13.3 SECONDS (ref 11.6–14.8)
RBC # BLD AUTO: 4.69 X10(6)UL
SODIUM SERPL-SCNC: 141 MMOL/L (ref 136–145)
WBC # BLD AUTO: 5.2 X10(3) UL (ref 4–11)

## 2023-09-20 PROCEDURE — 36415 COLL VENOUS BLD VENIPUNCTURE: CPT

## 2023-09-20 PROCEDURE — 85025 COMPLETE CBC W/AUTO DIFF WBC: CPT

## 2023-09-20 PROCEDURE — 85610 PROTHROMBIN TIME: CPT

## 2023-09-20 PROCEDURE — 80048 BASIC METABOLIC PNL TOTAL CA: CPT

## 2023-09-22 ENCOUNTER — HOSPITAL ENCOUNTER (OUTPATIENT)
Dept: INTERVENTIONAL RADIOLOGY/VASCULAR | Facility: HOSPITAL | Age: 83
Discharge: HOME | End: 2023-09-22
Attending: INTERNAL MEDICINE | Admitting: INTERNAL MEDICINE
Payer: MEDICARE

## 2023-09-22 VITALS
WEIGHT: 168 LBS | RESPIRATION RATE: 16 BRPM | TEMPERATURE: 96 F | DIASTOLIC BLOOD PRESSURE: 82 MMHG | OXYGEN SATURATION: 96 % | BODY MASS INDEX: 28.68 KG/M2 | HEIGHT: 64 IN | HEART RATE: 84 BPM | SYSTOLIC BLOOD PRESSURE: 144 MMHG

## 2023-09-22 DIAGNOSIS — Z01.818 PRE-OP TESTING: ICD-10-CM

## 2023-09-22 DIAGNOSIS — E78.2 MIXED HYPERLIPIDEMIA: ICD-10-CM

## 2023-09-22 DIAGNOSIS — N18.30 STAGE 3 CHRONIC KIDNEY DISEASE, UNSPECIFIED WHETHER STAGE 3A OR 3B CKD (HCC): Chronic | ICD-10-CM

## 2023-09-22 DIAGNOSIS — R74.8 ELEVATED ALKALINE PHOSPHATASE MEASUREMENT: ICD-10-CM

## 2023-09-22 DIAGNOSIS — M51.37 DEGENERATION OF LUMBAR OR LUMBOSACRAL INTERVERTEBRAL DISC: ICD-10-CM

## 2023-09-22 DIAGNOSIS — Z45.02 IMPLANTABLE DEFIBRILLATOR REPROGRAMMING/CHECK: ICD-10-CM

## 2023-09-22 DIAGNOSIS — E55.9 VITAMIN D DEFICIENCY: ICD-10-CM

## 2023-09-22 DIAGNOSIS — Z95.0 S/P BIVENTRICULAR CARDIAC PACEMAKER PROCEDURE: ICD-10-CM

## 2023-09-22 DIAGNOSIS — E53.8 B12 DEFICIENCY: ICD-10-CM

## 2023-09-22 DIAGNOSIS — I34.0 NONRHEUMATIC MITRAL VALVE REGURGITATION: ICD-10-CM

## 2023-09-22 DIAGNOSIS — I42.8 NONISCHEMIC CARDIOMYOPATHY (HCC): ICD-10-CM

## 2023-09-22 DIAGNOSIS — I27.20 PULMONARY HYPERTENSION (HCC): ICD-10-CM

## 2023-09-22 DIAGNOSIS — R94.39 ABNORMAL STRESS TEST: ICD-10-CM

## 2023-09-22 DIAGNOSIS — R76.8 ANA POSITIVE: ICD-10-CM

## 2023-09-22 DIAGNOSIS — I42.0 DILATED CARDIOMYOPATHY (HCC): ICD-10-CM

## 2023-09-22 DIAGNOSIS — I50.21 ACUTE SYSTOLIC CHF (CONGESTIVE HEART FAILURE), NYHA CLASS 1 (HCC): Primary | ICD-10-CM

## 2023-09-22 DIAGNOSIS — C73 PAPILLARY THYROID CARCINOMA (HCC): ICD-10-CM

## 2023-09-22 DIAGNOSIS — I44.7 LEFT BUNDLE BRANCH BLOCK (LBBB) ON ELECTROCARDIOGRAM: ICD-10-CM

## 2023-09-22 DIAGNOSIS — E04.2 MULTIPLE THYROID NODULES: ICD-10-CM

## 2023-09-22 DIAGNOSIS — Z71.85 VACCINE COUNSELING: ICD-10-CM

## 2023-09-22 DIAGNOSIS — R06.09 DOE (DYSPNEA ON EXERTION): ICD-10-CM

## 2023-09-22 DIAGNOSIS — I44.7 LEFT BUNDLE BRANCH BLOCK (LBBB): ICD-10-CM

## 2023-09-22 DIAGNOSIS — E11.65 UNCONTROLLED TYPE 2 DIABETES MELLITUS WITH HYPERGLYCEMIA (HCC): ICD-10-CM

## 2023-09-22 DIAGNOSIS — E87.6 HYPOKALEMIA: ICD-10-CM

## 2023-09-22 DIAGNOSIS — I35.9 AORTIC VALVE DISORDER: ICD-10-CM

## 2023-09-22 DIAGNOSIS — G47.09 OTHER INSOMNIA: ICD-10-CM

## 2023-09-22 DIAGNOSIS — I10 ESSENTIAL HYPERTENSION: ICD-10-CM

## 2023-09-22 DIAGNOSIS — K59.01 SLOW TRANSIT CONSTIPATION: ICD-10-CM

## 2023-09-22 DIAGNOSIS — I50.20 HFREF (HEART FAILURE WITH REDUCED EJECTION FRACTION) (HCC): ICD-10-CM

## 2023-09-22 PROCEDURE — 93460 R&L HRT ART/VENTRICLE ANGIO: CPT | Performed by: INTERNAL MEDICINE

## 2023-09-22 PROCEDURE — B2111ZZ FLUOROSCOPY OF MULTIPLE CORONARY ARTERIES USING LOW OSMOLAR CONTRAST: ICD-10-PCS | Performed by: INTERNAL MEDICINE

## 2023-09-22 PROCEDURE — 99152 MOD SED SAME PHYS/QHP 5/>YRS: CPT | Performed by: INTERNAL MEDICINE

## 2023-09-22 PROCEDURE — 4A023N8 MEASUREMENT OF CARDIAC SAMPLING AND PRESSURE, BILATERAL, PERCUTANEOUS APPROACH: ICD-10-PCS | Performed by: INTERNAL MEDICINE

## 2023-09-22 RX ORDER — MIDAZOLAM HYDROCHLORIDE 1 MG/ML
INJECTION INTRAMUSCULAR; INTRAVENOUS
Status: COMPLETED
Start: 2023-09-22 | End: 2023-09-22

## 2023-09-22 RX ORDER — ASPIRIN 81 MG/1
324 TABLET, CHEWABLE ORAL ONCE
Status: DISCONTINUED | OUTPATIENT
Start: 2023-09-22 | End: 2023-09-22

## 2023-09-22 RX ORDER — FUROSEMIDE 10 MG/ML
INJECTION INTRAMUSCULAR; INTRAVENOUS
Status: COMPLETED
Start: 2023-09-22 | End: 2023-09-22

## 2023-09-22 RX ORDER — LORAZEPAM 2 MG/ML
INJECTION INTRAMUSCULAR
Status: COMPLETED
Start: 2023-09-22 | End: 2023-09-22

## 2023-09-22 RX ORDER — SODIUM CHLORIDE 9 MG/ML
INJECTION, SOLUTION INTRAVENOUS
Status: DISCONTINUED | OUTPATIENT
Start: 2023-09-22 | End: 2023-09-22

## 2023-09-22 RX ORDER — LIDOCAINE HYDROCHLORIDE 20 MG/ML
INJECTION, SOLUTION EPIDURAL; INFILTRATION; INTRACAUDAL; PERINEURAL
Status: COMPLETED
Start: 2023-09-22 | End: 2023-09-22

## 2023-09-22 RX ORDER — LORAZEPAM 2 MG/ML
1-2 INJECTION INTRAMUSCULAR ONCE
Status: COMPLETED | OUTPATIENT
Start: 2023-09-22 | End: 2023-09-22

## 2023-09-22 RX ORDER — HEPARIN SODIUM 1000 [USP'U]/ML
INJECTION, SOLUTION INTRAVENOUS; SUBCUTANEOUS
Status: DISCONTINUED
Start: 2023-09-22 | End: 2023-09-22 | Stop reason: WASHOUT

## 2023-09-22 RX ORDER — BUMETANIDE 2 MG/1
1 TABLET ORAL DAILY
Qty: 45 TABLET | Refills: 0 | Status: SHIPPED | OUTPATIENT
Start: 2023-09-22 | End: 2023-12-21

## 2023-09-22 RX ORDER — ASPIRIN 81 MG/1
TABLET, CHEWABLE ORAL
Status: COMPLETED
Start: 2023-09-22 | End: 2023-09-22

## 2023-09-22 RX ADMIN — LORAZEPAM 1 MG: 2 INJECTION INTRAMUSCULAR at 13:34:00

## 2023-09-22 RX ADMIN — ASPIRIN 243 MG: 81 TABLET, CHEWABLE ORAL at 09:45:00

## 2023-09-28 NOTE — PROCEDURES
[facility]    Cardiac Cath Procedure Note    Yaquelin Mao Patient Status:  Outpatient    1940 MRN [id number]   Location [facility] Attending No att. providers found   [address] Day # 0 PCP Ana Gunter MD       Cardiologist: Fouzia Morataya MD  Primary Proceduralist: Fouzia Morataya MD  Procedure Performed: LHC, RHC, and LV  Date of Procedure: 2023   Indication: Abnormal stress test, HFrEF    Summary of procedure:    Nonischemic cardiomyopathy  Severely elevated filling pressures  Uncontrolled hypertension      Recommendations:  Aggressive risk factor modification including diuresis and blood pressure control  Discharge home in 2 hours      Left Ventriculography and hemodynamics:   LV EF not done  LV EDP 25 mmHg, 40 IV Lasix given  No gradient across aortic valve    RA 30  RV 48/25  PA 44/22 mean 33  PCW 35    CO 5/ CI 2.7   SVR 1356/       Coronary Angiography  RCA:  Dominant and free of obstructive disease, supplies PDA and PL    Left main:  Free of obstructive disease    Left anterior descending:  Free of obstructive disease, supplies multiple diagonals which are non-obstructive    Circumflex:  Free of obstructive disease, supplies multiple OM branches which are patent        Description of Procedure:   After written informed consent was obtained from the patient, patient was brought to the cardiac catheterization laboratory. Patient was prepped and draped in the usual sterile fashion. Lidocaine 1% was used to infiltrate the right groin for local anesthesia and a 6 Belgian introducer sheath was inserted into the right femoral artery via ultrasound guidance and micropuncture kit. Right femoral vein accessed by palpation. 7 FR sheath placed. Monitoring swan advanced to RA and pressures recorded in RA, RV, PA and wedge positions under fluoroscopic and hemodynamic guidance.        Selective coronary angiography performed with JR4 catheter for RCA and Raynaldo Thiago catheter for LCA. Angiography performed in standard projections. 6 Kyr[place]z JR4 catheter placed in LV for hemodynamics. Selective right femoral angiogram done assess anatomy for closure. Specimen sent to: No specimen collected  Estimated blood loss: 10 cc  Closure:  Perclose artery, pressure vein      IV was maintained by RN and moderate conscious sedation of versed and fentanyl was given. Patient was assessed and monitoring of oxygen, heart rate and blood pressure by nurse and myself during the exam 35 minutes.       Raul Gillespie MD  09/28/23

## 2023-09-28 NOTE — INTERVAL H&P NOTE
Pre-op Diagnosis: * No pre-op diagnosis entered *    The above referenced H&P was reviewed by Olivia Mcfarlane MD on 9/28/2023, the patient was examined and no significant changes have occurred in the patient's condition since the H&P was performed. I discussed with the patient and/or legal representative the potential benefits, risks and side effects of this procedure; the likelihood of the patient achieving goals; and potential problems that might occur during recuperation. I discussed reasonable alternatives to the procedure, including risks, benefits and side effects related to the alternatives and risks related to not receiving this procedure. We will proceed with procedure as planned.

## 2023-10-03 RX ORDER — ALLOPURINOL 300 MG/1
300 TABLET ORAL DAILY
Qty: 90 TABLET | Refills: 1 | Status: SHIPPED | OUTPATIENT
Start: 2023-10-03

## 2023-10-10 ENCOUNTER — MED REC SCAN ONLY (OUTPATIENT)
Dept: INTERNAL MEDICINE CLINIC | Facility: CLINIC | Age: 83
End: 2023-10-10

## 2023-10-25 ENCOUNTER — TELEPHONE (OUTPATIENT)
Dept: INTERNAL MEDICINE CLINIC | Facility: CLINIC | Age: 83
End: 2023-10-25

## 2023-10-25 ENCOUNTER — OFFICE VISIT (OUTPATIENT)
Dept: INTERNAL MEDICINE CLINIC | Facility: CLINIC | Age: 83
End: 2023-10-25

## 2023-10-25 VITALS
HEIGHT: 64 IN | OXYGEN SATURATION: 98 % | RESPIRATION RATE: 18 BRPM | WEIGHT: 169.81 LBS | HEART RATE: 99 BPM | SYSTOLIC BLOOD PRESSURE: 128 MMHG | BODY MASS INDEX: 28.99 KG/M2 | DIASTOLIC BLOOD PRESSURE: 75 MMHG

## 2023-10-25 DIAGNOSIS — R26.89 BALANCE PROBLEM: ICD-10-CM

## 2023-10-25 DIAGNOSIS — G47.00 INSOMNIA, UNSPECIFIED TYPE: Primary | ICD-10-CM

## 2023-10-25 PROCEDURE — 99214 OFFICE O/P EST MOD 30 MIN: CPT | Performed by: INTERNAL MEDICINE

## 2023-10-25 PROCEDURE — 1159F MED LIST DOCD IN RCRD: CPT | Performed by: INTERNAL MEDICINE

## 2023-10-25 PROCEDURE — 3008F BODY MASS INDEX DOCD: CPT | Performed by: INTERNAL MEDICINE

## 2023-10-25 PROCEDURE — 1125F AMNT PAIN NOTED PAIN PRSNT: CPT | Performed by: INTERNAL MEDICINE

## 2023-10-25 PROCEDURE — 1160F RVW MEDS BY RX/DR IN RCRD: CPT | Performed by: INTERNAL MEDICINE

## 2023-10-25 PROCEDURE — 3078F DIAST BP <80 MM HG: CPT | Performed by: INTERNAL MEDICINE

## 2023-10-25 PROCEDURE — 3074F SYST BP LT 130 MM HG: CPT | Performed by: INTERNAL MEDICINE

## 2023-10-25 NOTE — TELEPHONE ENCOUNTER
Patient requests to speak to Dr. Berny Bobo directly, in regards to everything going on in her body. No appointments available in the next two weeks.

## 2023-10-25 NOTE — PROGRESS NOTES
Subjective:     Patient ID: Addy Hernandez is a [de-identified]year old female. Fall        History/Other: She came in today to discuss few problems. According to her for last couple of weeks she is having trouble sleeping. She tried melatonin is not helping. She also states that for the last 2 days she is having itchiness on her body that started after eating Doritos chips. Same thing happened to her to 3 weeks ago when she ate Doritos    She also had a fall on Thursday and Sunday on Sunday she felt like her knees gave up ,no loss of consciousness. She felt unsteady. Review of Systems   Constitutional: Negative. HENT: Negative. Eyes: Negative. Respiratory: Negative. Cardiovascular: Negative. Gastrointestinal: Negative. Genitourinary: Negative. Musculoskeletal: Negative. Skin: Negative. Neurological: Negative. Hematological: Negative. Psychiatric/Behavioral: Negative. Current Outpatient Medications   Medication Sig Dispense Refill    allopurinol 300 MG Oral Tab Take 1 tablet (300 mg total) by mouth daily. 90 tablet 1    bumetanide 2 MG Oral Tab Take 0.5 tablets (1 mg total) by mouth daily. 45 tablet 0    levothyroxine 150 MCG Oral Tab Take 1 tablet (150 mcg total) by mouth before breakfast. 90 tablet 1    metoprolol succinate ER 50 MG Oral Tablet 24 Hr Take 2 tablets (100 mg total) by mouth 2 (two) times daily. 360 tablet 1    potassium chloride 20 MEQ Oral Tab CR Take 1 tablet (20 mEq total) by mouth 2 (two) times daily. 180 tablet 3    atorvastatin 40 MG Oral Tab Take 1 tablet (40 mg total) by mouth nightly. 90 tablet 1    ENTRESTO 49-51 MG Oral Tab Take 1 tablet by mouth 2 (two) times daily. Omeprazole 40 MG Oral Capsule Delayed Release Take 1 capsule (40 mg total) by mouth daily. 90 capsule 1    Vitamin B12 100 MCG Oral Tab Take 1 tablet (100 mcg total) by mouth daily. Cholecalciferol 50 MCG (2000 UT) Oral Cap Take 2,000 Units by mouth daily.         aspirin 81 MG Oral Tab Take 1 tablet (81 mg total) by mouth daily. Allergies:  Erythromycin            HIVES    Past Medical History:   Diagnosis Date    Anxiety state, unspecified     Degeneration of lumbar or lumbosacral intervertebral disc 9/18/2018    Gout     IBS (irritable bowel syndrome)     Left bundle branch block (LBBB) on electrocardiogram 6-1-15    AbNL regaadenosine. CTA shows mild soft plaque RCA calcium score zero. Migraine     Papillary thyroid carcinoma ([address] 3/5/2022    TIA (transient ischemic attack) 2010ish ? Past Surgical History:   Procedure Laterality Date    CHOLECYSTECTOMY      COLONOSCOPY      OTHER SURGICAL HISTORY      LASER SURGERY ON GALL BLADDER STONES    THYROIDECTOMY  8/31/15    Total thyroidectomy. 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     UPPER GI ENDOSCOPY,EXAM        Family History   Problem Relation Age of Onset    Hypertension Mother     Heart Disease Mother 36        CAD S/P MI. Cancer Brother         Lung cancer. Construction. Social History:   Social History     Socioeconomic History    Marital status:    Tobacco Use    Smoking status: Never    Smokeless tobacco: Never   Substance and Sexual Activity    Alcohol use: Yes     Comment: occ    Drug use: No   Other Topics Concern     Service No    Blood Transfusions No    Caffeine Concern Yes     Comment: sodas 3-4 cans daily    Occupational Exposure No    Hobby Hazards No    Sleep Concern Yes     Comment: has anxiety , does not sleep well. Stress Concern No    Weight Concern Yes     Comment: lost 20 lb in in 3-4 month    Special Diet No    Back Care No    Exercise Yes     Comment: cardio    Bike Helmet No    Seat Belt Yes    Self-Exams Yes        Objective:   Physical Exam  Vitals and nursing note reviewed. Constitutional:       Appearance: Normal appearance. HENT:      Head: Normocephalic and atraumatic. Cardiovascular:      Rate and Rhythm: Normal rate and regular rhythm. Pulses: Normal pulses. Heart sounds: Normal heart sounds. Pulmonary:      Effort: Pulmonary effort is normal.      Breath sounds: Normal breath sounds. Abdominal:      Palpations: Abdomen is soft. Musculoskeletal:         General: Normal range of motion. Cervical back: Normal range of motion and neck supple. Skin:     General: Skin is warm. Neurological:      Mental Status: She is alert. Mental status is at baseline. Assessment & Plan:   Balance problem  (primary encounter diagnosis) total for physical therapy    Insomnia discussed about sleep hygiene, advised her to take Tylenol PM that might help,    Itchiness -allergic reaction can take Benadryl    No orders of the defined types were placed in this encounter.       Meds This Visit:  Requested Prescriptions      No prescriptions requested or ordered in this encounter       Imaging & Referrals:  PHYSICAL THERAPY - INTERNAL

## 2023-11-17 DIAGNOSIS — E87.6 HYPOKALEMIA: ICD-10-CM

## 2023-11-18 RX ORDER — POTASSIUM CHLORIDE 20 MEQ/1
20 TABLET, EXTENDED RELEASE ORAL 2 TIMES DAILY
Qty: 180 TABLET | Refills: 3 | OUTPATIENT
Start: 2023-11-18

## 2023-12-22 ENCOUNTER — HOSPITAL ENCOUNTER (EMERGENCY)
Facility: HOSPITAL | Age: 83
Discharge: HOME | End: 2023-12-23
Attending: EMERGENCY MEDICINE
Payer: MEDICARE

## 2023-12-22 ENCOUNTER — APPOINTMENT (OUTPATIENT)
Dept: GENERAL RADIOLOGY | Facility: HOSPITAL | Age: 83
End: 2023-12-22
Attending: EMERGENCY MEDICINE
Payer: MEDICARE

## 2023-12-22 DIAGNOSIS — R06.00 DYSPNEA, UNSPECIFIED TYPE: Primary | ICD-10-CM

## 2023-12-22 LAB
BASOPHILS # BLD AUTO: 0.03 X10(3) UL (ref 0–0.2)
BASOPHILS NFR BLD AUTO: 0.3 %
BNP SERPL-MCNC: 12 PG/ML
DEPRECATED RDW RBC AUTO: 44.4 FL (ref 35.1–46.3)
EOSINOPHIL # BLD AUTO: 0.08 X10(3) UL (ref 0–0.7)
EOSINOPHIL NFR BLD AUTO: 0.8 %
ERYTHROCYTE [DISTWIDTH] IN BLOOD BY AUTOMATED COUNT: 14.4 % (ref 11–15)
HCT VFR BLD AUTO: 39.6 %
HGB BLD-MCNC: 13.7 G/DL
IMM GRANULOCYTES # BLD AUTO: 0.03 X10(3) UL (ref 0–1)
IMM GRANULOCYTES NFR BLD: 0.3 %
LIPASE SERPL-CCNC: 29 U/L (ref 13–75)
LYMPHOCYTES # BLD AUTO: 3.9 X10(3) UL (ref 1–4)
LYMPHOCYTES NFR BLD AUTO: 40 %
MCH RBC QN AUTO: 29.3 PG (ref 26–34)
MCHC RBC AUTO-ENTMCNC: 34.6 G/DL (ref 31–37)
MCV RBC AUTO: 84.8 FL
MONOCYTES # BLD AUTO: 0.79 X10(3) UL (ref 0.1–1)
MONOCYTES NFR BLD AUTO: 8.1 %
NEUTROPHILS # BLD AUTO: 4.93 X10 (3) UL (ref 1.5–7.7)
NEUTROPHILS # BLD AUTO: 4.93 X10(3) UL (ref 1.5–7.7)
NEUTROPHILS NFR BLD AUTO: 50.5 %
PLATELET # BLD AUTO: 237 10(3)UL (ref 150–450)
RBC # BLD AUTO: 4.67 X10(6)UL
WBC # BLD AUTO: 9.8 X10(3) UL (ref 4–11)

## 2023-12-22 PROCEDURE — 85379 FIBRIN DEGRADATION QUANT: CPT | Performed by: EMERGENCY MEDICINE

## 2023-12-22 PROCEDURE — 85025 COMPLETE CBC W/AUTO DIFF WBC: CPT | Performed by: EMERGENCY MEDICINE

## 2023-12-22 PROCEDURE — 80053 COMPREHEN METABOLIC PANEL: CPT | Performed by: EMERGENCY MEDICINE

## 2023-12-22 PROCEDURE — 96374 THER/PROPH/DIAG INJ IV PUSH: CPT

## 2023-12-22 PROCEDURE — 83880 ASSAY OF NATRIURETIC PEPTIDE: CPT | Performed by: EMERGENCY MEDICINE

## 2023-12-22 PROCEDURE — 71045 X-RAY EXAM CHEST 1 VIEW: CPT | Performed by: EMERGENCY MEDICINE

## 2023-12-22 PROCEDURE — 93010 ELECTROCARDIOGRAM REPORT: CPT

## 2023-12-22 PROCEDURE — 94640 AIRWAY INHALATION TREATMENT: CPT

## 2023-12-22 PROCEDURE — 96375 TX/PRO/DX INJ NEW DRUG ADDON: CPT

## 2023-12-22 PROCEDURE — 83690 ASSAY OF LIPASE: CPT | Performed by: EMERGENCY MEDICINE

## 2023-12-22 PROCEDURE — 93005 ELECTROCARDIOGRAM TRACING: CPT

## 2023-12-22 PROCEDURE — 99284 EMERGENCY DEPT VISIT MOD MDM: CPT

## 2023-12-22 PROCEDURE — 99285 EMERGENCY DEPT VISIT HI MDM: CPT

## 2023-12-22 RX ORDER — IPRATROPIUM BROMIDE AND ALBUTEROL SULFATE 2.5; .5 MG/3ML; MG/3ML
3 SOLUTION RESPIRATORY (INHALATION) ONCE
Status: COMPLETED | OUTPATIENT
Start: 2023-12-22 | End: 2023-12-22

## 2023-12-22 RX ORDER — MORPHINE SULFATE 2 MG/ML
2 INJECTION, SOLUTION INTRAMUSCULAR; INTRAVENOUS ONCE
Status: COMPLETED | OUTPATIENT
Start: 2023-12-22 | End: 2023-12-22

## 2023-12-22 RX ORDER — MIDAZOLAM HYDROCHLORIDE 1 MG/ML
1 INJECTION INTRAMUSCULAR; INTRAVENOUS ONCE
Status: COMPLETED | OUTPATIENT
Start: 2023-12-22 | End: 2023-12-23

## 2023-12-23 ENCOUNTER — APPOINTMENT (OUTPATIENT)
Dept: CT IMAGING | Facility: HOSPITAL | Age: 83
End: 2023-12-23
Attending: EMERGENCY MEDICINE
Payer: MEDICARE

## 2023-12-23 ENCOUNTER — TELEPHONE (OUTPATIENT)
Dept: INTERNAL MEDICINE CLINIC | Facility: CLINIC | Age: 83
End: 2023-12-23

## 2023-12-23 VITALS
DIASTOLIC BLOOD PRESSURE: 56 MMHG | OXYGEN SATURATION: 95 % | RESPIRATION RATE: 25 BRPM | SYSTOLIC BLOOD PRESSURE: 136 MMHG | WEIGHT: 169.75 LBS | BODY MASS INDEX: 29 KG/M2 | HEART RATE: 96 BPM | TEMPERATURE: 98 F

## 2023-12-23 VITALS
OXYGEN SATURATION: 100 % | HEART RATE: 108 BPM | RESPIRATION RATE: 18 BRPM | SYSTOLIC BLOOD PRESSURE: 146 MMHG | DIASTOLIC BLOOD PRESSURE: 44 MMHG | TEMPERATURE: 99 F

## 2023-12-23 DIAGNOSIS — E83.42 HYPOMAGNESEMIA: ICD-10-CM

## 2023-12-23 DIAGNOSIS — R10.9 ABDOMINAL PAIN, ACUTE: Primary | ICD-10-CM

## 2023-12-23 LAB
ALBUMIN SERPL-MCNC: 3.9 G/DL (ref 3.2–4.8)
ALBUMIN SERPL-MCNC: 4.2 G/DL (ref 3.2–4.8)
ALBUMIN/GLOB SERPL: 1.2 {RATIO} (ref 1–2)
ALBUMIN/GLOB SERPL: 1.2 {RATIO} (ref 1–2)
ALP LIVER SERPL-CCNC: 121 U/L
ALP LIVER SERPL-CCNC: 123 U/L
ALT SERPL-CCNC: 31 U/L
ALT SERPL-CCNC: 41 U/L
ANION GAP SERPL CALC-SCNC: 13 MMOL/L (ref 0–18)
ANION GAP SERPL CALC-SCNC: 18 MMOL/L (ref 0–18)
AST SERPL-CCNC: 36 U/L (ref ?–34)
AST SERPL-CCNC: 36 U/L (ref ?–34)
AST SERPL-CCNC: 48 U/L (ref ?–34)
ATRIAL RATE: 84 BPM
BASOPHILS # BLD AUTO: 0.02 X10(3) UL (ref 0–0.2)
BASOPHILS NFR BLD AUTO: 0.2 %
BILIRUB SERPL-MCNC: 1.1 MG/DL (ref 0.2–1.1)
BILIRUB SERPL-MCNC: 1.9 MG/DL (ref 0.2–1.1)
BUN BLD-MCNC: 21 MG/DL (ref 9–23)
BUN BLD-MCNC: 24 MG/DL (ref 9–23)
BUN/CREAT SERPL: 13.9 (ref 10–20)
CALCIUM BLD-MCNC: 9.1 MG/DL (ref 8.7–10.4)
CALCIUM BLD-MCNC: 9.1 MG/DL (ref 8.7–10.4)
CHLORIDE SERPL-SCNC: 101 MMOL/L (ref 98–112)
CHLORIDE SERPL-SCNC: 97 MMOL/L (ref 98–112)
CO2 SERPL-SCNC: 20 MMOL/L (ref 21–32)
CO2 SERPL-SCNC: 21 MMOL/L (ref 21–32)
CREAT BLD-MCNC: 1.51 MG/DL
CREAT BLD-MCNC: 1.57 MG/DL
D DIMER PPP FEU-MCNC: 1.97 UG/ML FEU (ref ?–0.81)
DEPRECATED RDW RBC AUTO: 44.5 FL (ref 35.1–46.3)
EGFRCR SERPLBLD CKD-EPI 2021: 33 ML/MIN/1.73M2 (ref 60–?)
EGFRCR SERPLBLD CKD-EPI 2021: 35 ML/MIN/1.73M2 (ref 60–?)
EOSINOPHIL # BLD AUTO: 0.15 X10(3) UL (ref 0–0.7)
EOSINOPHIL NFR BLD AUTO: 1.6 %
ERYTHROCYTE [DISTWIDTH] IN BLOOD BY AUTOMATED COUNT: 14.6 % (ref 11–15)
FLUAV + FLUBV RNA SPEC NAA+PROBE: NEGATIVE
GLOBULIN PLAS-MCNC: 3.3 G/DL (ref 2.8–4.4)
GLOBULIN PLAS-MCNC: 3.4 G/DL (ref 2.8–4.4)
GLUCOSE BLD-MCNC: 120 MG/DL (ref 70–99)
GLUCOSE BLD-MCNC: 128 MG/DL (ref 70–99)
HCT VFR BLD AUTO: 39 %
HGB BLD-MCNC: 13.3 G/DL
IMM GRANULOCYTES # BLD AUTO: 0.03 X10(3) UL (ref 0–1)
IMM GRANULOCYTES NFR BLD: 0.3 %
LIPASE SERPL-CCNC: 24 U/L (ref 13–75)
LYMPHOCYTES # BLD AUTO: 2.97 X10(3) UL (ref 1–4)
LYMPHOCYTES NFR BLD AUTO: 30.8 %
MAGNESIUM SERPL-MCNC: 1.4 MG/DL (ref 1.6–2.6)
MCH RBC QN AUTO: 28.7 PG (ref 26–34)
MCHC RBC AUTO-ENTMCNC: 34.1 G/DL (ref 31–37)
MCV RBC AUTO: 84.1 FL
MONOCYTES # BLD AUTO: 0.66 X10(3) UL (ref 0.1–1)
MONOCYTES NFR BLD AUTO: 6.8 %
NEUTROPHILS # BLD AUTO: 5.81 X10 (3) UL (ref 1.5–7.7)
NEUTROPHILS # BLD AUTO: 5.81 X10(3) UL (ref 1.5–7.7)
NEUTROPHILS NFR BLD AUTO: 60.3 %
OSMOLALITY SERPL CALC.SUM OF ELEC: 283 MOSM/KG (ref 275–295)
P AXIS: 46 DEGREES
P-R INTERVAL: 122 MS
PLATELET # BLD AUTO: 240 10(3)UL (ref 150–450)
POTASSIUM SERPL-SCNC: 3.7 MMOL/L (ref 3.5–5.1)
POTASSIUM SERPL-SCNC: 4.1 MMOL/L (ref 3.5–5.1)
PROT SERPL-MCNC: 7.2 G/DL (ref 5.7–8.2)
PROT SERPL-MCNC: 7.6 G/DL (ref 5.7–8.2)
Q-T INTERVAL: 458 MS
QRS DURATION: 134 MS
QTC CALCULATION (BEZET): 541 MS
R AXIS: -57 DEGREES
RBC # BLD AUTO: 4.64 X10(6)UL
RSV RNA SPEC NAA+PROBE: NEGATIVE
RSV RNA SPEC NAA+PROBE: NEGATIVE
SARS-COV-2 RNA RESP QL NAA+PROBE: NOT DETECTED
SARS-COV-2 RNA RESP QL NAA+PROBE: NOT DETECTED
SODIUM SERPL-SCNC: 134 MMOL/L (ref 136–145)
SODIUM SERPL-SCNC: 136 MMOL/L (ref 136–145)
T AXIS: 81 DEGREES
VENTRICULAR RATE: 84 BPM
WBC # BLD AUTO: 9.6 X10(3) UL (ref 4–11)

## 2023-12-23 PROCEDURE — 96361 HYDRATE IV INFUSION ADD-ON: CPT

## 2023-12-23 PROCEDURE — 0241U SARS-COV-2/FLU A AND B/RSV BY PCR (GENEXPERT): CPT | Performed by: EMERGENCY MEDICINE

## 2023-12-23 PROCEDURE — 99285 EMERGENCY DEPT VISIT HI MDM: CPT

## 2023-12-23 PROCEDURE — 93005 ELECTROCARDIOGRAM TRACING: CPT

## 2023-12-23 PROCEDURE — 83735 ASSAY OF MAGNESIUM: CPT | Performed by: EMERGENCY MEDICINE

## 2023-12-23 PROCEDURE — 84450 TRANSFERASE (AST) (SGOT): CPT | Performed by: EMERGENCY MEDICINE

## 2023-12-23 PROCEDURE — 96375 TX/PRO/DX INJ NEW DRUG ADDON: CPT

## 2023-12-23 PROCEDURE — 93010 ELECTROCARDIOGRAM REPORT: CPT

## 2023-12-23 PROCEDURE — 74177 CT ABD & PELVIS W/CONTRAST: CPT | Performed by: EMERGENCY MEDICINE

## 2023-12-23 PROCEDURE — 80053 COMPREHEN METABOLIC PANEL: CPT | Performed by: EMERGENCY MEDICINE

## 2023-12-23 PROCEDURE — 83690 ASSAY OF LIPASE: CPT | Performed by: EMERGENCY MEDICINE

## 2023-12-23 PROCEDURE — S0028 INJECTION, FAMOTIDINE, 20 MG: HCPCS | Performed by: EMERGENCY MEDICINE

## 2023-12-23 PROCEDURE — 84520 ASSAY OF UREA NITROGEN: CPT | Performed by: EMERGENCY MEDICINE

## 2023-12-23 PROCEDURE — 94640 AIRWAY INHALATION TREATMENT: CPT

## 2023-12-23 PROCEDURE — 96365 THER/PROPH/DIAG IV INF INIT: CPT

## 2023-12-23 PROCEDURE — 85025 COMPLETE CBC W/AUTO DIFF WBC: CPT | Performed by: EMERGENCY MEDICINE

## 2023-12-23 PROCEDURE — 71260 CT THORAX DX C+: CPT | Performed by: EMERGENCY MEDICINE

## 2023-12-23 PROCEDURE — 84132 ASSAY OF SERUM POTASSIUM: CPT | Performed by: EMERGENCY MEDICINE

## 2023-12-23 RX ORDER — FAMOTIDINE 10 MG/ML
20 INJECTION, SOLUTION INTRAVENOUS ONCE
Status: COMPLETED | OUTPATIENT
Start: 2023-12-23 | End: 2023-12-23

## 2023-12-23 RX ORDER — ALBUTEROL SULFATE 90 UG/1
2 AEROSOL, METERED RESPIRATORY (INHALATION) EVERY 4 HOURS PRN
Qty: 1 EACH | Refills: 0 | Status: SHIPPED | OUTPATIENT
Start: 2023-12-23 | End: 2024-01-22

## 2023-12-23 RX ORDER — DIAZEPAM 5 MG/ML
5 INJECTION, SOLUTION INTRAMUSCULAR; INTRAVENOUS ONCE
Status: COMPLETED | OUTPATIENT
Start: 2023-12-23 | End: 2023-12-23

## 2023-12-23 RX ORDER — MAGNESIUM SULFATE HEPTAHYDRATE 40 MG/ML
2 INJECTION, SOLUTION INTRAVENOUS ONCE
Status: COMPLETED | OUTPATIENT
Start: 2023-12-23 | End: 2023-12-23

## 2023-12-23 RX ORDER — IPRATROPIUM BROMIDE AND ALBUTEROL SULFATE 2.5; .5 MG/3ML; MG/3ML
3 SOLUTION RESPIRATORY (INHALATION) ONCE
Status: COMPLETED | OUTPATIENT
Start: 2023-12-23 | End: 2023-12-23

## 2023-12-23 RX ORDER — TRIAMCINOLONE ACETONIDE 1 MG/G
1 CREAM TOPICAL 2 TIMES DAILY
Qty: 45 G | Refills: 0 | Status: SHIPPED | OUTPATIENT
Start: 2023-12-23

## 2023-12-23 RX ORDER — ONDANSETRON 2 MG/ML
4 INJECTION INTRAMUSCULAR; INTRAVENOUS ONCE
Status: COMPLETED | OUTPATIENT
Start: 2023-12-23 | End: 2023-12-23

## 2023-12-23 RX ORDER — CLONAZEPAM 1 MG/1
1 TABLET ORAL NIGHTLY PRN
Qty: 5 TABLET | Refills: 0 | Status: SHIPPED | OUTPATIENT
Start: 2023-12-23 | End: 2023-12-28

## 2023-12-23 NOTE — TELEPHONE ENCOUNTER
I received a call from pt daughter Trish Davila and she stated that pt was discharge from the ER at 3 am but once she got home she stated to feels the sob again, headache and now her grand daughter is taking pt back to ER. Trish Davila will call us back for a f/u appt.

## 2023-12-23 NOTE — ED INITIAL ASSESSMENT (HPI)
Patient to ed via private vehicle patient was in ed x last night, was dc x 0300 today, reported isaac was worsening today.  Lungs clear in triage

## 2023-12-23 NOTE — ED PROVIDER NOTES
Signout taken with disposition pending CT abd/pelvis results in setting of unremarkable labs and prior evaluation for dyspnea with nonacute workup; imaging nonacute with [facility] pending for which family advised of same and comfortable with discharge, additionally advised of pending results in MyChart. CT ABDOMEN PELVIS IV CONTRAST, NO ORAL (ER)    Result Date: 12/23/2023  PROCEDURE: CT ABDOMEN PELVIS IV CONTRAST NO ORAL (ER)  COMPARISON: [facility], CT ABDOMEN+PELVIS (YJP=[id number]), 9/15/2021, 12:37 PM.  [address] [place] [address] ([id number]), 9/06/2022, 6:36 PM.  INDICATIONS: SOB; Headache  TECHNIQUE: CT images of the abdomen and pelvis were obtained with non-ionic intravenous contrast material.  Automated exposure control for dose reduction was used. Adjustment of the mA and/or kV was done based on the patient's size. Use of iterative reconstruction technique for dose reduction was used. Dose information is transmitted to the [facility] Radiology) [facility] ([address] [place] [address]) which includes the Dose Index Registry. FINDINGS:  Linear atelectasis left base  Normal liver with small calcified granulomas. Cholecystectomy. No biliary dilation. Patent portal vein. Normal pancreas with small calcified granulomas  Normal adrenals. Normal kidneys. Simple 1 centimeter left renal cyst requires no follow-up  Normal aorta  Normal bowel loops including appendix. No free fluid or air. Pelvis shows normal bladder. 2.2 x 1.5 centimeter periurethral diverticulum. Normal-size uterus with calcified fibroids.   No adnexal mass  No bone lesion or fracture         CONCLUSION: No acute or concerning findings    Dictated by (CST): Yamile Wilcox MD on 12/23/2023 at 3:00 PM     Finalized by (CST): Yamile Wilcox MD on 12/23/2023 at 3:06 PM          CT CHEST PE AORTA (IV ONLY) (CPT=71260)    Result Date: 12/23/2023  PROCEDURE: CT CHEST PE AORTA (IV ONLY) (CPT=71260) COMPARISON: [facility], XR CHEST AP PORTABLE (CPT=71045), 12/22/2023, 11:09 PM.  [facility], CT CHEST PE AORTA (IV ONLY) (CPT=71260), 11/29/2020, 0:17 AM.  INDICATIONS: Difficulty breathing with shortness of breath. TECHNIQUE: Multidetector CT images of the chest were obtained with non-ionic intravenous contrast material. Automated exposure control for dose reduction was used. Adjustment of the mA and/or kV was done based on the patient's size. Iterative reconstruction technique for dose reduction was employed. Dose information was transmitted to the [facility] of [facility]iology) [employer] ([address]), which includes the Dose Index Registry. Multiplanar reformats and maximum intensity projection images were created. FINDINGS: DEVICES: A left-sided multi-lead transvenous defibrillator device is present with electrodes in customary position. VASCULATURE: There is adequate opacification of the pulmonary arterial tree. No suspicious filling defects are identified in the main, lobar, or segmental pulmonary artery branches to suggest acute pulmonary embolism. The subsegmental branches are less well assessed. The main pulmonary artery trunk is dilated in caliber, measuring 3.0 cm. CARDIAC: The heart is enlarged. There is no bowing of the interventricular septum to suggest right ventricular strain. THORACIC AORTA: Unremarkable configuration without evidence of dissection. The ascending thoracic aorta is ectatic, measuring 3.5 x 3.7 cm. LUNGS/PLEURA: There is dependent subsegmental atelectasis bilaterally. Additional scattered ground-glass and reticular opacities are present and may be atelectatic in origin. No airspace consolidation, pleural effusion, or pneumothorax is detected. AIRWAYS: Indentation of the posterior tracheal contour is seen. The tracheobronchial tree is without central mass or obstructing lesion. MEDIASTINUM/HENRRY: No mass or lymphadenopathy. Calcified lymph nodes are seen. CHEST WALL: No axillary mass or lymphadenopathy. Serpiginous collateral vessels of the anterior chest wall are noted. LIMITED ABDOMEN: Within the parameters of the arterial phase of contrast-enhancement, the included upper abdomen is notable for extensive granulomatous calcifications of the liver and spleen. The gallbladder is surgically absent with cholecystectomy clips in the gallbladder fossa. There is a well circumscribed hypodense left renal lesion which is not completely characterized, but statistically likely represents a cyst.   BONES: Mild scoliosis and multilevel degenerative changes of the spine are demonstrated with associated vacuum disc phenomenon. There are degenerative changes of the shoulders. CONCLUSION:  1. No evidence of acute pulmonary embolism to the level of the segmental pulmonary artery branches. 2. Scattered atelectasis without further acute intrathoracic process. 3. Dilatation of the main pulmonary artery trunk may relate to underlying pulmonary hypertension. 4. Mild ascending thoracic aortic ectasia. 5. Status post cholecystectomy. 6. Sequela of remote granulomatous disease. 7. Lesser incidental findings as above. A preliminary report was issued by the [address] [place] [address] Radiology teleradiology service. There are no major discrepancies.    Dictated by (CST): Pieter Tobin MD on 12/23/2023 at 10:22 AM     Finalized by (CST): Pieter Tobin MD on 12/23/2023 at 10:28 AM

## 2023-12-23 NOTE — ED INITIAL ASSESSMENT (HPI)
Patient c/o SOB and abdominal pain that's been going on for the past 3 days. Denies sick contacts. Patient is labored breathing but 98% on RA. + wheezing +Diarrhea but no N/V. Denies fevers.

## 2023-12-24 LAB
ATRIAL RATE: 102 BPM
P AXIS: 41 DEGREES
P-R INTERVAL: 128 MS
Q-T INTERVAL: 432 MS
QRS DURATION: 148 MS
QTC CALCULATION (BEZET): 563 MS
R AXIS: -53 DEGREES
T AXIS: 97 DEGREES
VENTRICULAR RATE: 102 BPM

## 2023-12-27 DIAGNOSIS — E87.6 HYPOKALEMIA: ICD-10-CM

## 2023-12-28 ENCOUNTER — PATIENT OUTREACH (OUTPATIENT)
Dept: CASE MANAGEMENT | Age: 83
End: 2023-12-28

## 2023-12-28 ENCOUNTER — NURSE TRIAGE (OUTPATIENT)
Dept: INTERNAL MEDICINE CLINIC | Facility: CLINIC | Age: 83
End: 2023-12-28

## 2023-12-28 DIAGNOSIS — G47.09 OTHER INSOMNIA: Primary | ICD-10-CM

## 2023-12-28 RX ORDER — POTASSIUM CHLORIDE 20 MEQ/1
20 TABLET, EXTENDED RELEASE ORAL 2 TIMES DAILY
Qty: 180 TABLET | Refills: 1 | Status: SHIPPED | OUTPATIENT
Start: 2023-12-28

## 2023-12-28 NOTE — TELEPHONE ENCOUNTER
Per patient she would like to come and see Dr Adair for an ER follow up. No available appointment , Please advise.

## 2023-12-28 NOTE — TELEPHONE ENCOUNTER
Action Requested: Summary for Provider     []  Critical Lab, Recommendations Needed  [x] Need Additional Advice  []   FYI    []   Need Orders  [] Need Medications Sent to Pharmacy  []  Other     SUMMARY: Pt initially requesting to schedule ER follow-up appointment. Pt has been in the ER twice in the last week.  Pt mentioned for 3-4 weeks, she has been experiencing insomnia, unable to fall asleep and stay asleep. Pt states she goes to bed in the evening, but does not fall asleep until 5am, wakes up after a few hours. Pt does not take naps. Pt states melatonin does not help. Pt mentioned he took son's sleeping pill. Recommended not to take other people's medication. Home care measures provided for Pt. Pt states only thing that helps her sleep is alcohol.   Please advise on appointment. Pt declined other providers. Pt states she has seen other providers, did not like her experience.    Pt made aware Dr. Adair is not in the office until after 1/2/23      Reason for call: Insomnia (Only 1-2 hours of sleep for 3-4 weeks. )  Onset: Data Unavailable                     Reason for Disposition   Insomnia persists > 1 week and following Insomnia Care Advice    Protocols used: Insomnia-A-[place]

## 2023-12-28 NOTE — TELEPHONE ENCOUNTER
It is noted that patient is aware of Dr. Adair being out of the office. Routing for follow up when Dr. Adair back in the office.

## 2023-12-28 NOTE — TELEPHONE ENCOUNTER
Please review; protocol failed/No Protocol  Requested Prescriptions   Pending Prescriptions Disp Refills    POTASSIUM CHLORIDE 20 MEQ Oral Tab CR [Pharmacy Med Name: POTASSIUM CL 20MEQ ER TABLETS] 180 tablet 3     Sig: TAKE 1 TABLET BY MOUTH TWICE DAILY       There is no refill protocol information for this order          Recent Outpatient Visits              2 months ago Insomnia, unspecified type    Janice Shin MD    Office Visit    3 months ago Cardiomyopathy, nonischemic [facility])    [address] Only    5 months ago Essential hypertension    Melva Jerome MD    Office Visit    8 months ago Stage 3b chronic kidney disease [facility])    Luke Syed, Otto Braun MD    Office Visit    1 year ago Mixed hyperlipidemia    Zeus Cox, [address], Otto Braun MD    Office Visit

## 2024-01-02 RX ORDER — METOPROLOL TARTRATE 100 MG/1
150 TABLET ORAL 2 TIMES DAILY
COMMUNITY
Start: 2023-11-21

## 2024-01-02 NOTE — TELEPHONE ENCOUNTER
She has had this problem for greater than 2 years since I have met her.  We have talked about the issues with insomnia before.  She had an issue to do a sleep study back from 2016.  See note from March 5, 2022.  She was on temazepam in the past.  And that she was not sure if it helped or not?  I noticed that she is not currently taking it?  I would recommend for her to get a sleep study to address any other issues that may be causing the insomnia.  Orders written.

## 2024-01-02 NOTE — TELEPHONE ENCOUNTER
Attempted to call, mailbox was full, could not accept messages.  [employer] message sent.

## 2024-01-03 NOTE — TELEPHONE ENCOUNTER
Attempted to call, no VM    Noted detailed [employer] message was sent. Patient is listed as active for [employer].

## 2024-01-30 ENCOUNTER — TELEPHONE (OUTPATIENT)
Dept: INTERNAL MEDICINE CLINIC | Facility: CLINIC | Age: 84
End: 2024-01-30

## 2024-01-30 NOTE — TELEPHONE ENCOUNTER
Patient calling to ask to Dr. Adair regarding ER follow up, is scheduled for next available appointment for 05/16/24.

## 2024-01-30 NOTE — TELEPHONE ENCOUNTER
Unable to reach Pt for a Condition update-mailbox Full,not sure if she needs to be seen sooner leola may

## 2024-02-05 RX ORDER — FUROSEMIDE 20 MG/1
20 TABLET ORAL DAILY
COMMUNITY
Start: 2023-12-27 | End: 2024-02-06

## 2024-02-05 RX ORDER — VALSARTAN 160 MG/1
160 TABLET ORAL DAILY
COMMUNITY
Start: 2024-01-24

## 2024-02-05 RX ORDER — METOPROLOL SUCCINATE 100 MG/1
100 TABLET, EXTENDED RELEASE ORAL DAILY
COMMUNITY
Start: 2024-01-24

## 2024-02-05 NOTE — TELEPHONE ENCOUNTER
Since I am going to be seeing her for an acute visit when we just reschedule the visit for May for a physical to whenever we have time for the physical.  I do not want to do my rescue 24 for a physical because then if I can get other patients in.

## 2024-02-05 NOTE — TELEPHONE ENCOUNTER
I am not sure why 2 appointments were made for 30 minutes each both were ER follow-ups.  I specifically said on the message 1 was an ER follow-up but she needs to schedule physical which is a longer appointment.  Sudden if there was a confusion with that message.

## 2024-02-05 NOTE — TELEPHONE ENCOUNTER
Can move her appt to 9:30am (res visit) and therefore she would be scheduled for a 60min physical.   Unless you are okay w/ me keeping it as a 30min physical.     Or just reschedule all together?

## 2024-02-05 NOTE — TELEPHONE ENCOUNTER
Appt made for 5/16 is for the recent ER f/u. However, I think phone room just booked it \"for now\" as that was your next availability. THEN an appt was confirmed for 2/6 with your okay.     With this being said, okay to cancel the 5/16 appt, correct?

## 2024-02-06 ENCOUNTER — OFFICE VISIT (OUTPATIENT)
Dept: INTERNAL MEDICINE CLINIC | Facility: CLINIC | Age: 84
End: 2024-02-06

## 2024-02-06 VITALS
TEMPERATURE: 98 F | BODY MASS INDEX: 30.32 KG/M2 | SYSTOLIC BLOOD PRESSURE: 155 MMHG | WEIGHT: 177.63 LBS | OXYGEN SATURATION: 100 % | DIASTOLIC BLOOD PRESSURE: 77 MMHG | HEART RATE: 88 BPM | HEIGHT: 64 IN

## 2024-02-06 DIAGNOSIS — E53.8 B12 DEFICIENCY: Primary | ICD-10-CM

## 2024-02-06 DIAGNOSIS — Z71.85 VACCINE COUNSELING: ICD-10-CM

## 2024-02-06 DIAGNOSIS — E55.9 VITAMIN D DEFICIENCY: ICD-10-CM

## 2024-02-06 DIAGNOSIS — E11.65 UNCONTROLLED TYPE 2 DIABETES MELLITUS WITH HYPERGLYCEMIA (HCC): ICD-10-CM

## 2024-02-06 DIAGNOSIS — G47.09 OTHER INSOMNIA: ICD-10-CM

## 2024-02-06 DIAGNOSIS — C73 PAPILLARY THYROID CARCINOMA (HCC): ICD-10-CM

## 2024-02-06 DIAGNOSIS — E78.2 MIXED HYPERLIPIDEMIA: ICD-10-CM

## 2024-02-06 DIAGNOSIS — M10.00 IDIOPATHIC GOUT, UNSPECIFIED CHRONICITY, UNSPECIFIED SITE: ICD-10-CM

## 2024-02-06 DIAGNOSIS — I10 ESSENTIAL HYPERTENSION: ICD-10-CM

## 2024-02-06 LAB
ALBUMIN SERPL-MCNC: 4.1 G/DL (ref 3.2–4.8)
ALBUMIN/GLOB SERPL: 1.3 {RATIO} (ref 1–2)
ALP LIVER SERPL-CCNC: 138 U/L
ALT SERPL-CCNC: 14 U/L
ANION GAP SERPL CALC-SCNC: 7 MMOL/L (ref 0–18)
AST SERPL-CCNC: 19 U/L (ref ?–34)
BILIRUB SERPL-MCNC: 0.7 MG/DL (ref 0.2–1.1)
BUN BLD-MCNC: 16 MG/DL (ref 9–23)
BUN/CREAT SERPL: 17.6 (ref 10–20)
CALCIUM BLD-MCNC: 9.2 MG/DL (ref 8.7–10.4)
CHLORIDE SERPL-SCNC: 108 MMOL/L (ref 98–112)
CHOLEST SERPL-MCNC: 149 MG/DL (ref ?–200)
CO2 SERPL-SCNC: 28 MMOL/L (ref 21–32)
CREAT BLD-MCNC: 0.91 MG/DL
CREAT UR-SCNC: 153.7 MG/DL
EGFRCR SERPLBLD CKD-EPI 2021: 63 ML/MIN/1.73M2 (ref 60–?)
FASTING PATIENT LIPID ANSWER: YES
FASTING STATUS PATIENT QL REPORTED: YES
GLOBULIN PLAS-MCNC: 3.2 G/DL (ref 2.8–4.4)
GLUCOSE BLD-MCNC: 81 MG/DL (ref 70–99)
HDLC SERPL-MCNC: 52 MG/DL (ref 40–59)
LDLC SERPL CALC-MCNC: 70 MG/DL (ref ?–100)
MICROALBUMIN UR-MCNC: 0.4 MG/DL
MICROALBUMIN/CREAT 24H UR-RTO: 2.6 UG/MG (ref ?–30)
NONHDLC SERPL-MCNC: 97 MG/DL (ref ?–130)
OSMOLALITY SERPL CALC.SUM OF ELEC: 296 MOSM/KG (ref 275–295)
POTASSIUM SERPL-SCNC: 4.2 MMOL/L (ref 3.5–5.1)
PROT SERPL-MCNC: 7.3 G/DL (ref 5.7–8.2)
SODIUM SERPL-SCNC: 143 MMOL/L (ref 136–145)
TRIGL SERPL-MCNC: 162 MG/DL (ref 30–149)
URATE SERPL-MCNC: 3.6 MG/DL
VIT B12 SERPL-MCNC: 515 PG/ML (ref 211–911)
VIT D+METAB SERPL-MCNC: 24.7 NG/ML (ref 30–100)
VLDLC SERPL CALC-MCNC: 25 MG/DL (ref 0–30)

## 2024-02-06 PROCEDURE — 90677 PCV20 VACCINE IM: CPT | Performed by: INTERNAL MEDICINE

## 2024-02-06 PROCEDURE — 1126F AMNT PAIN NOTED NONE PRSNT: CPT | Performed by: INTERNAL MEDICINE

## 2024-02-06 PROCEDURE — 3077F SYST BP >= 140 MM HG: CPT | Performed by: INTERNAL MEDICINE

## 2024-02-06 PROCEDURE — 3078F DIAST BP <80 MM HG: CPT | Performed by: INTERNAL MEDICINE

## 2024-02-06 PROCEDURE — 99214 OFFICE O/P EST MOD 30 MIN: CPT | Performed by: INTERNAL MEDICINE

## 2024-02-06 PROCEDURE — 1160F RVW MEDS BY RX/DR IN RCRD: CPT | Performed by: INTERNAL MEDICINE

## 2024-02-06 PROCEDURE — 1159F MED LIST DOCD IN RCRD: CPT | Performed by: INTERNAL MEDICINE

## 2024-02-06 PROCEDURE — 3008F BODY MASS INDEX DOCD: CPT | Performed by: INTERNAL MEDICINE

## 2024-02-06 PROCEDURE — G0009 ADMIN PNEUMOCOCCAL VACCINE: HCPCS | Performed by: INTERNAL MEDICINE

## 2024-02-06 RX ORDER — NORTRIPTYLINE HYDROCHLORIDE 25 MG/1
25 CAPSULE ORAL NIGHTLY
Qty: 30 CAPSULE | Refills: 2 | Status: SHIPPED | OUTPATIENT
Start: 2024-02-06

## 2024-02-06 RX ORDER — FUROSEMIDE 20 MG/1
20 TABLET ORAL DAILY
Qty: 90 TABLET | Refills: 1 | Status: SHIPPED | OUTPATIENT
Start: 2024-02-06

## 2024-02-06 NOTE — PROGRESS NOTES
HPI:    Patient ID: Rozina Haynes is a 83 year old female.    Chief Complaint   Patient presents with    Follow - Up     Patient states she is here for a follow up. Patient states she is having trouble sleep and also  had a panic attack a few days ago.        Patient here for follow up of Diabetes.  Has been taking medications regularly.    Checks sugars 1 times daily.  Fasting sugars average not check.    Watches diabetic diet, low salt. Eats breakfest at 10 AM and dinner at 5-6 PM.   Diabetic Flow sheet      2/6/2024     2:32 PM 2/6/2024     1:39 PM 2/6/2024     1:38 PM 1/24/2024    12:00 AM 12/23/2023     4:00 PM   DIABETIC FLOWSHEET ([facility])   BMI  30.48 kg/m2      Valsartan 160 mg Daily OR    160 mg Daily OR     160 mg Daily OR     160 mg Daily OR        Weight (enc vitals)  177 lb 9.6 oz      BP (enc vitals)  155/77   146/44   PHQ2 Score   0         Patient-reported medication       HISTORY OF DIABETES COMPLICATIONS: :  History of Retinopathy:   History of Neuropathy:   History of Nephropathy:      ASSOCIATED COMPLICATIONS:   HTN: Yes.   Hyperlipidemia: Yes.   Coronary Artery Disease:  CAD,  HF, PAD  Cerebrovascular Disease: Yes.      MEALS:  2  Is another option  Concerns amitriptyline with your blood pressure being high amitriptyline weight is about?  And is a little concerning to me but there is another 1 within the past called nortriptyline which we could use Lexapro is another 1 within that class also may be doing something that does not interfere with your blood pressure but the same time will give you relief is an option I think this was better but if the nortriptyline you are talking thank meals a day  Cooks at home    Hypertension  Patient is here for follow up of hypertension. BP at home: checks 3 times a week BID. 150/60's. Afternoons: 140/80's.   Has been compliant with medications.  Exercise level: somewhat active and has been following low salt diet.  Weight has been stable. Not like restaurant food.    Wt Readings from Last 3 Encounters:   02/06/24 177 lb 9.6 oz (80.6 kg)   12/22/23 169 lb 12.1 oz (77 kg)   10/25/23 169 lb 12.8 oz (77 kg)     BP Readings from Last 3 Encounters:   02/06/24 155/77   12/23/23 146/44   12/23/23 136/56     Labs:   Lab Results   Component Value Date/Time     (H) 12/23/2023 12:31 PM     (L) 12/23/2023 12:31 PM    K 3.7 12/23/2023 12:31 PM     12/23/2023 12:31 PM    CO2 20.0 (L) 12/23/2023 12:31 PM    CREATSERUM 1.51 (H) 12/23/2023 12:31 PM    CA 9.1 12/23/2023 12:31 PM    AST 36 (H) 12/23/2023 12:31 PM    ALT 31 12/23/2023 12:31 PM    TSH 17.500 (H) 07/17/2023 11:46 AM    T4F 1.1 07/17/2023 11:46 AM        Lab Results   Component Value Date/Time    CHOLEST 149 09/12/2022 01:49 PM    HDL 50 09/12/2022 01:49 PM    TRIG 142 09/12/2022 01:49 PM    LDL 76 09/12/2022 01:49 PM    NONHDLC 99 09/12/2022 01:49 PM            Wt Readings from Last 3 Encounters:   02/06/24 177 lb 9.6 oz (80.6 kg)   12/22/23 169 lb 12.1 oz (77 kg)   10/25/23 169 lb 12.8 oz (77 kg)     BP Readings from Last 3 Encounters:   02/06/24 155/77   12/23/23 146/44   12/23/23 136/56     Labs:   Lab Results   Component Value Date/Time     (H) 12/23/2023 12:31 PM     (L) 12/23/2023 12:31 PM    K 3.7 12/23/2023 12:31 PM     12/23/2023 12:31 PM    CO2 20.0 (L) 12/23/2023 12:31 PM    CREATSERUM 1.51 (H) 12/23/2023 12:31 PM    CA 9.1 12/23/2023 12:31 PM    AST 36 (H) 12/23/2023 12:31 PM    ALT 31 12/23/2023 12:31 PM    TSH 17.500 (H) 07/17/2023 11:46 AM    T4F 1.1 07/17/2023 11:46 AM        Lab Results   Component Value Date/Time    CHOLEST 149 09/12/2022 01:49 PM    HDL 50 09/12/2022 01:49 PM    TRIG 142 09/12/2022 01:49 PM    LDL 76 09/12/2022 01:49 PM    NONHDLC 99 09/12/2022 01:49 PM          Goes to bed at 10-12. Falls asleep at 4 AM. Turn TV off by 12-1 AM.  Then, between that time awakens 7 AM.  So essentially gets 3 hours of sleep at night.  Does not nap during the day.  Denies nocturia  or pain as a cause of it.  Cannot seem to settle down line.  Feels like room close and occasional panic attacks.  Has been doing like this for several months if not years.  Seems of gotten worse in the past 6 months.  Denies snoring.  Awakens feeling unrefreshed.  Lives by self. No stressed. Son lives in same. Denies restless leg. Took daughter's elavil and helped.           Review of Systems   Constitutional:  Positive for fatigue. Negative for activity change, appetite change, chills, diaphoresis, fever and unexpected weight change.   Respiratory:  Negative for apnea, cough, choking, chest tightness, shortness of breath, wheezing and stridor.    Cardiovascular:  Negative for chest pain, palpitations and leg swelling.   Gastrointestinal:  Negative for abdominal distention and abdominal pain.   Endocrine: Negative for cold intolerance, heat intolerance, polydipsia, polyphagia and polyuria.   Neurological:  Negative for dizziness, tremors, seizures, syncope, facial asymmetry, speech difficulty, weakness, light-headedness, numbness and headaches.   Psychiatric/Behavioral:  Positive for agitation, decreased concentration and sleep disturbance. Negative for behavioral problems, confusion, dysphoric mood, hallucinations, self-injury and suicidal ideas. The patient is nervous/anxious. The patient is not hyperactive.    All other systems reviewed and are negative.        Current Outpatient Medications   Medication Sig Dispense Refill    furosemide 20 MG Oral Tab Take 1 tablet (20 mg total) by mouth daily. 90 tablet 1    nortriptyline 25 MG Oral Cap Take 1 capsule (25 mg total) by mouth nightly. 30 capsule 2    valsartan 160 MG Oral Tab Take 1 tablet (160 mg total) by mouth daily.      metoprolol succinate  MG Oral Tablet 24 Hr Take 1 tablet (100 mg total) by mouth daily.      metoprolol tartrate 100 MG Oral Tab Take 1.5 tablets (150 mg total) by mouth 2 (two) times daily.      potassium chloride 20 MEQ Oral Tab CR  Take 1 tablet (20 mEq total) by mouth 2 (two) times daily. 180 tablet 1    triamcinolone 0.1 % External Cream Apply 1 Application topically 2 (two) times daily. 45 g 0    allopurinol 300 MG Oral Tab Take 1 tablet (300 mg total) by mouth daily. 90 tablet 1    levothyroxine 150 MCG Oral Tab Take 1 tablet (150 mcg total) by mouth before breakfast. 90 tablet 1    metoprolol succinate ER 50 MG Oral Tablet 24 Hr Take 2 tablets (100 mg total) by mouth 2 (two) times daily. 360 tablet 1    atorvastatin 40 MG Oral Tab Take 1 tablet (40 mg total) by mouth nightly. 90 tablet 1    ENTRESTO 49-51 MG Oral Tab Take 1 tablet by mouth 2 (two) times daily.      Omeprazole 40 MG Oral Capsule Delayed Release Take 1 capsule (40 mg total) by mouth daily. 90 capsule 1    Vitamin B12 100 MCG Oral Tab Take 1 tablet (100 mcg total) by mouth daily.      Cholecalciferol 50 MCG (2000 UT) Oral Cap Take 2,000 Units by mouth daily.        aspirin 81 MG Oral Tab Take 1 tablet (81 mg total) by mouth daily.       Allergies:  Allergies   Allergen Reactions    Erythromycin HIVES       HISTORY:  Past Medical History:   Diagnosis Date    Anxiety state, unspecified     Degeneration of lumbar or lumbosacral intervertebral disc 9/18/2018    Gout     IBS (irritable bowel syndrome)     Left bundle branch block (LBBB) on electrocardiogram 6-1-15    AbNL regaadenosine. CTA shows mild soft plaque RCA calcium score zero.     Migraine     Papillary thyroid carcinoma (HCC) 3/5/2022    TIA (transient ischemic attack) 2010ish ?      Past Surgical History:   Procedure Laterality Date    CHOLECYSTECTOMY      COLONOSCOPY      OTHER SURGICAL HISTORY      LASER SURGERY ON GALL BLADDER STONES    THYROIDECTOMY  8/31/15    Total thyroidectomy. 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     UPPER GI ENDOSCOPY,EXAM        Family History   Problem Relation Age of Onset    Hypertension Mother     Heart Disease Mother 40        CAD S/P MI.     Cancer Brother         Lung  cancer. Construction.       Social History:   Social History     Socioeconomic History    Marital status:    Tobacco Use    Smoking status: Never    Smokeless tobacco: Never   Vaping Use    Vaping Use: Never used   Substance and Sexual Activity    Alcohol use: Yes     Comment: occ    Drug use: No   Other Topics Concern     Service No    Blood Transfusions No    Caffeine Concern Yes     Comment: sodas 3-4 cans daily    Occupational Exposure No    Hobby Hazards No    Sleep Concern Yes     Comment: has anxiety , does not sleep well.    Stress Concern No    Weight Concern Yes     Comment: lost 20 lb in in 3-4 month    Special Diet No    Back Care No    Exercise Yes     Comment: cardio    Bike Helmet No    Seat Belt Yes    Self-Exams Yes        PHYSICAL EXAM:   /77 (BP Location: Right arm, Patient Position: Sitting, Cuff Size: adult)   Pulse 88   Temp 98.1 °F (36.7 °C) (Oral)   Ht 5' 4\" (1.626 m)   Wt 177 lb 9.6 oz (80.6 kg)   SpO2 100%   BMI 30.48 kg/m²   BP Readings from Last 3 Encounters:   02/06/24 155/77   12/23/23 146/44   12/23/23 136/56     Wt Readings from Last 3 Encounters:   02/06/24 177 lb 9.6 oz (80.6 kg)   12/22/23 169 lb 12.1 oz (77 kg)   10/25/23 169 lb 12.8 oz (77 kg)       Physical Exam  Vitals and nursing note reviewed.   Constitutional:       General: She is not in acute distress.     Appearance: Normal appearance. She is well-developed. She is not ill-appearing, toxic-appearing or diaphoretic.      Interventions: She is not intubated.  Neck:      Thyroid: No thyroid mass or thyromegaly.      Trachea: Trachea and phonation normal.   Cardiovascular:      Rate and Rhythm: Normal rate and regular rhythm.      Pulses: Normal pulses. No decreased pulses.           Carotid pulses are 2+ on the right side and 2+ on the left side.       Radial pulses are 2+ on the right side and 2+ on the left side.        Dorsalis pedis pulses are 2+ on the right side and 2+ on the left side.         Posterior tibial pulses are 2+ on the right side and 2+ on the left side.      Heart sounds: Normal heart sounds, S1 normal and S2 normal.   Pulmonary:      Effort: Pulmonary effort is normal. No tachypnea, bradypnea, accessory muscle usage, prolonged expiration, respiratory distress or retractions. She is not intubated.      Breath sounds: Normal breath sounds and air entry. No stridor, decreased air movement or transmitted upper airway sounds. No decreased breath sounds, wheezing, rhonchi or rales.   Chest:      Chest wall: No tenderness.   Abdominal:      General: There is no distension.      Palpations: Abdomen is soft.      Tenderness: There is no abdominal tenderness.   Musculoskeletal:      Right lower leg: No edema.      Left lower leg: No edema.   Skin:     General: Skin is warm and dry.   Neurological:      Mental Status: She is alert and oriented to person, place, and time.   Psychiatric:         Attention and Perception: She is attentive. She does not perceive auditory or visual hallucinations.         Mood and Affect: Mood is not anxious, depressed or elated. Affect is not labile, blunt, flat, angry, tearful or inappropriate.         Speech: She is communicative. Speech is not rapid and pressured, delayed, slurred or tangential.         Behavior: Behavior is not agitated, slowed, aggressive, withdrawn, hyperactive or combative. Behavior is cooperative.         Thought Content: Thought content normal. Thought content is not paranoid or delusional. Thought content does not include homicidal or suicidal ideation. Thought content does not include homicidal or suicidal plan.              ASSESSMENT/PLAN:     Encounter Diagnoses   Name Primary?    B12 deficiency Check blood.    Yes    Papillary thyroid carcinoma (HCC) Check US. Check blood.        Mixed hyperlipidemia Check blood.        Vaccine counseling pneumonia 20 vaccine today.  Recommend shingles vaccine.  There is 2 doses of the vaccine  by  2 months 6 months apart.  Check on insurance coverage on shingles vaccine.  May be covered better at the pharmacy.  Separate shingles vaccine from all of the vaccines by at least 1 to 2 weeks.  Discussed about side effects of vaccine. Recommend RSV vaccine.  Would check on insurance coverage at local pharmacy.  RSV is a one-time vaccine as of now.  Potential side effects with RSV vaccine are low-grade fevers body aches etc.  Also would recommend flu shot.  Separate from RSV vaccine by 2 weeks.  Also would recommend new COVID-vaccine.  Separate from other 2 vaccines by 2 weeks. Respiratory Syncytial Virus (RSV)  What is respiratory syncytial virus?   Respiratory syncytial virus, or RSV, is a common virus that affects the lungs and breathing passages.  What are the symptoms?   Symptoms of RSV typically include mild cold-like symptoms including runny nose, sore throat, cough, and headache. Sometimes RSV can lead to serious complications including:   Pneumonia (infection of the lungs)  More severe symptoms for people with asthma  More severe symptoms for people with chronic obstructive pulmonary disease (COPD) (a chronic disease of the lungs that makes it hard to breathe)  Congestive heart failure (when the heart can't pump blood and oxygen to the body's tissues)  Older adults who get very sick from RSV may need to be hospitalized. Some may even die. Older adults are at greater risk than young adults for serious complications from RSV because the immune system weakens as one gets older.  Who is most at risk for complications?  RSV infections can be dangerous for infants and certain adults. Those at highest risk for severe RSV infection include:  Infants under 1 year of age  Older adults, especially those 65 years and older  Adults with chronic heart or lung disease  Adults with weakened immune systems  How is it treated?  There is no specific treatment for RSV infection, though researchers are working to develop  vaccines and antivirals (medicines that fight viruses).  How can I protect others if I have RSV?  RSV season occurs each year in most regions of the [place] during fall, winter, and spring. If you are at high risk for severe RSV infection, or if you interact with an infant or older adult, you should take extra care to stay healthy:   Wash your hands often - Wash your hands often with soap and water for 20 seconds. If soap and water are not available, use an alcohol-based hand . Washing your hands will help protect you from germs.  Keep your hands off your face - Avoid touching your eyes, nose, and mouth with unwashed hands. Germs spread this way.   Avoid close contact with sick people - Avoid close contact, such as kissing, and sharing cups or eating utensils with people who have cold-like symptoms.  Cover your coughs and sneezes - Cover your mouth and nose with a tissue when coughing or sneezing. Throw the tissue in the trash afterward.  Clean and disinfect surfaces - Clean and disinfect surfaces that people frequently touch, such as doorknobs. When people infected with RSV touch surfaces and objects, they can leave behind germs. Also, when they cough or sneeze, droplets containing germs can land on surfaces and objects.   Stay home when you are sick - If possible, stay home from work, school, and public areas when you are sick. This will help protect others from catching your illness.    Centers for Disease Control & Prevention (CDC)  RSV Symptoms & Care, https://www.cdc.gov/rsv/factsheet-older-adults.pdf      Uncontrolled type 2 diabetes mellitus with hyperglycemia (HCC) Check blood urine. ? Control. Careful with diet and excercise at least 30 minutes 3-4 times a week. Check sugars at different times on different dates. Careful with low sugars. Carry something with you and check sugar if can. Can carry christel cracker, etc. Decrease carbohydrates. But also, careful with fruits and natural sugars.One serving  a day and no more than 1 handful every day. Check feet  every AM and careful with sores and ulcers on feet bilaterally. Check eyes every year with dilated eye exam.  Check sugars.  2-hour postmeal should be less than 140s.  Pre-meal should be 's.  Both equally affected A1c.  Discussed importance of glycemic control to prevent complications of diabetes  -Discussed complications of diabetes include retinopathy, neuropathy, nephropathy and cardiovascular disease  -Discussed ABCs of DM  -Discussed importance of SBGM  -Discussed importance of low CHO diet, recommend 45gm per meal or 135gm per day  -FU with optho       Vitamin D deficiency Check blood.        Idiopathic gout, unspecified chronicity, unspecified site Stable. .Check blood.        Essential hypertension Higher.  Increase Diovan to 160 in the morning and 80 mg in the evening.  RN only visit in 1 week to check blood pressures.  And then call in 2 weeks after on reading to see if her blood pressures running.Careful with diet and excercise at least 30 minutes 3-4 times a week. Check blood pressures at different times on different days. Can purchase own blood pressure monitor. If not, check at local pharmacy. Bake foods more and grill occasionally. Avoid fried foods. No salt. Use other seasonings.         Other insomnia discussed about options.  Try nortriptyline 20 5 at night.  Discussed with patient of side effects and use of these medications.  Monitor blood pressures.Stick to a sleep schedule. Keep your bedtime and wake time consistent from day to day, including on weekends.   Stay active. Regular activity helps promote a good night's sleep. Schedule exercise at least a few hours before bedtime and avoid stimulating activities before bedtime.   Check your medications. If you take medications regularly, check with your doctor to see if they may be contributing to your insomnia. Also check the labels of OTC products to see if they contain caffeine or  other stimulants, such as pseudoephedrine.   Avoid or limit naps. Naps can make it harder to fall asleep at night. If you can't get by without one, try to limit a nap to no more than 30 minutes and don't nap after 3 p.m.   Avoid or limit caffeine and alcohol and don't use nicotine. All of these can make it harder to sleep, and effects can last for several hours.   Avoid large meals and beverages before bed. A light snack is fine and may help avoid heartburn. Drink less liquid before bedtime so that you won't have to urinate as often.  At bedtime:  Try melatonin 10 mg 30 minutes before bed.  Make your bedroom comfortable for sleep. Only use your bedroom for sex or sleep. Keep it dark and quiet, at a comfortable temperature. Hide all clocks in your bedroom, including your wristwatch and cellphone, so you don't worry about what time it is.   Find ways to relax. Try to put your worries and planning aside when you get into bed. A warm bath or a massage before bedtime can help prepare you for sleep. Create a relaxing bedtime ritual, such as taking a hot bath, reading, soft music, breathing exercises, yoga or prayer.   Avoid trying too hard to sleep. The harder you try, the more awake you'll become. Read in another room until you become very drowsy, then go to bed to sleep. Don't go to bed too early, before you're sleepy.   Get out of bed when you're not sleeping. Sleep as much as you need to feel rested, and then get out of bed. Don't stay in bed if you're not sleeping.         Orders Placed This Encounter   Procedures    Lipid Panel    Comp Metabolic Panel (14)    Hemoglobin A1C    Microalb/Creat Ratio, Random Urine    Vitamin B12    Uric Acid    Prevnar 20 (PCV20) [17520]       Meds This Visit:  Requested Prescriptions     Signed Prescriptions Disp Refills    furosemide 20 MG Oral Tab 90 tablet 1     Sig: Take 1 tablet (20 mg total) by mouth daily.    nortriptyline 25 MG Oral Cap 30 capsule 2     Sig: Take 1 capsule (25  mg total) by mouth nightly.       Imaging & Referrals:  PCV20 VACCINE FOR INTRAMUSCULAR USE  US THYROID (CPT=76536)        Has set follow-up.

## 2024-02-06 NOTE — PATIENT INSTRUCTIONS
tient Position: Sitting, Cuff Size: adult)   Pulse 88   Temp 98.1 °F (36.7 °C) (Oral)   Ht 5' 4\" (1.626 m)   Wt 177 lb 9.6 oz (80.6 kg)   SpO2 100%   BMI 30.48 kg/m²   BP Readings from Last 3 Encounters:   02/06/24 155/77   12/23/23 146/44   12/23/23 136/56     Wt Readings from Last 3 Encounters:   02/06/24 177 lb 9.6 oz (80.6 kg)   12/22/23 169 lb 12.1 oz (77 kg)   10/25/23 169 lb 12.8 oz (77 kg)       Physical Exam         ASSESSMENT/PLAN:     Encounter Diagnoses   Name Primary?    B12 deficiency Check blood.    Yes    Papillary thyroid carcinoma (HCC) Check US. Check blood.        Mixed hyperlipidemia Check blood.        Vaccine counseling pneumonia 20 vaccine today.  Recommend shingles vaccine.  There is 2 doses of the vaccine  by 2 months 6 months apart.  Check on insurance coverage on shingles vaccine.  May be covered better at the pharmacy.  Separate shingles vaccine from all of the vaccines by at least 1 to 2 weeks.  Discussed about side effects of vaccine. Recommend RSV vaccine.  Would check on insurance coverage at local pharmacy.  RSV is a one-time vaccine as of now.  Potential side effects with RSV vaccine are low-grade fevers body aches etc.  Also would recommend flu shot.  Separate from RSV vaccine by 2 weeks.  Also would recommend new COVID-vaccine.  Separate from other 2 vaccines by 2 weeks.     Shingles (Herpes Zoster)     Talk to your healthcare provider about the shingles vaccine.   Shingles is also called herpes zoster. It's a painful skin rash caused by the herpes zoster virus. This is the same virus that causes chickenpox. After a person has chickenpox, the virus stays inactive in the nerve cells. Years later, the virus can become active again and travel along the nerve to the skin. Most people have shingles only once. But it's possible to have it more than once.  Who is at risk for shingles?  Anyone who has ever had chickenpox can get shingles. But your risk is greater if  you:  Are age 50 or older  Have an illness that weakens your immune system, such as HIV/AIDS  Have cancer, especially Hodgkin disease or lymphoma  Take medicines that weaken your immune system  What are the symptoms of shingles?  The first sign of shingles is often pain, burning, tingling, or itching on one part of your face or body. You may also feel as if you have the flu, with fever and chills.  A red rash with small blisters appears in a few days. The rash may look as follows:   The blisters can occur anywhere, but they’re most common on the back, chest, or belly (abdomen).  They usually appear on only one side of the body, spreading along the nerve pathway where the virus is reactivating.   The rash can also form around an eye, along one side of the face or neck, or in the mouth.  In a few people, often those with a weak immune system, shingles appear on more than one part of the body at once.  After a few days, the blisters become dry and form a crust. The crust falls off in days to weeks. The blisters generally don't leave scars. But they can in severe cases. Or if someone has a weak immune system.    How is shingles treated?  For most people, shingles heals on its own in a few days or weeks. But treatment is advised to help ease pain, speed healing, and reduce the risk of complications. Antiviral medicines are most often only prescribed if you are seen by a healthcare provider within the first 72 hours of having the rash. But antiviral medicines may be prescribed even after 72 hours if someone's immune system is weak. Or if the infection is extensive, severe, or isn't going away. To reduce symptoms:  Apply ice packs or cool compresses, or soak in a cool bath. To make an ice pack, put ice cubes in a plastic bag that seals at the top. Wrap the bag in a clean, thin towel or cloth. Never put ice or an ice pack directly on the skin.  Use calamine lotion to calm itchy skin.  Ask your provider about  over-the-counter pain relievers. If your pain is severe, your provider may prescribe stronger pain medicines.  What are possible complications of shingles?  Shingles often goes away with no lasting effects. But some people have complications during or after the infection comes out:  Postherpetic neuralgia. This is the most common complication. It's more likely as people age, especially after age 60. It' is nerve pain at the place where the rash used to be. It can range from mild to severe. It can last for only a few days, or for months or even years after you have had shingles. Antiviral medicines given during the first 72 hours of the rash can reduce the chance of postherpetic neuralgia. Other medicines can be prescribed to help ease the pain and improve quality of life.  Bacterial infection. Shingles blisters may get infected with bacteria. Depending on the severity of the infection, topical, oral or IV (intravenous) antibiotic medicine is used to treat the infection.  Eye problems. If you have shingles on the face, see your healthcare provider right away. Shingles can cause serious problems with vision, and even blindness.  In very rare cases, shingles can also lead to pneumonia, hearing problems, brain inflammation, or even death.   When to get medical care  Call your healthcare provider if you have any of these:  New symptoms or symptoms that don’t go away with treatment  A rash or blisters near your eye  More drainage, fever, or rash after treatment  Severe pain that doesn’t go away  How can shingles be prevented?  You can only get shingles if you have had chickenpox in the past. Someone who never had chickenpox can get the virus from you. But instead of have shingles, the person may get chickenpox. Until your blisters form scabs, don't have any contact with others, especially the following:  Pregnant women who have never had chickenpox or the vaccine  Babies who were born early (premature) or who had low  weight at birth  People with weak immune systems (such as people getting chemotherapy for cancer, people who have had organ transplants, or people with HIV infections), particularly if they have never had chicken pox.  The shingles vaccine  Two shingles vaccines are available to help prevent shingles or make it less painful:  Zoster vaccine live (ZVL)  Recombinant zoster vaccine (RZV). This is a newer vaccine that has been available since 2017.  You should get the shingles vaccine if you are healthy and age 50 or older, even if you've had shingles in the past. Two shots of the RZV vaccine are recommended. You should get the second RZV shot 2 to 6 months after the first. The vaccine makes it less likely that you will develop shingles. If you do develop shingles, your symptoms will likely be milder than if you hadn’t been vaccinated. RZV is also advised even if you had the older shingles vaccine in the past. That's because the RZV vaccine works better and protects you from shingles longer.  Talk with your healthcare provider about the best time to get vaccinated. Ask which vaccine is best for you based on your age and health conditions.  [employer] last reviewed this educational content on 6/1/2019  © 2563-1271 [employer]. All rights reserved. This information is not intended as a substitute for professional medical care. Always follow your healthcare professional's instructions.        Text SUPPORT1 pe 88919 to learn if you may be eligible for financial support with your medication(s).    Msg & Data Rates May Apply. Msg freq varies. Terms apply. Text HELP for help. Text STOP to end.   Zoster Vaccine, Recombinant injection  Brand Name: SHINGRIX  What is this medicine?  ZOSTER VACCINE (ZOS ter vak SEEN) is used to prevent shingles in adults 50 years old and over. This vaccine is not used to treat shingles or nerve pain from shingles.  How should I use this medicine?  This vaccine is for injection in a  muscle. It is given by a health care professional.  Talk to your pediatrician regarding the use of this medicine in children. This medicine is not approved for use in children.  What side effects may I notice from receiving this medicine?  Side effects that you should report to your doctor or health care professional as soon as possible:  allergic reactions like skin rash, itching or hives, swelling of the face, lips, or tongue  breathing problems  Side effects that usually do not require medical attention (report these to your doctor or health care professional if they continue or are bothersome):  chills  headache  fever  nausea, vomiting  redness, warmth, pain, swelling or itching at site where injected  tiredness  What may interact with this medicine?    medicines that suppress your immune system  medicines to treat cancer  steroid medicines like prednisone or cortisone  What if I miss a dose?  Keep appointments for follow-up (booster) doses as directed. It is important not to miss your dose. Call your doctor or health care professional if you are unable to keep an appointment.  Where should I keep my medicine?  This vaccine is only given in a clinic, pharmacy, doctor's office, or other health care setting and will not be stored at home.  What should I tell my health care provider before I take this medicine?  They need to know if you have any of these conditions:  blood disorders or disease  cancer like leukemia or lymphoma  immune system problems or therapy  an unusual or allergic reaction to vaccines, other medications, foods, dyes, or preservatives  pregnant or trying to get pregnant  breast-feeding  What should I watch for while using this medicine?  Visit your doctor for regular check ups.  This vaccine, like all vaccines, may not fully protect everyone.  NOTE:This sheet is a summary. It may not cover all possible information. If you have questions about this medicine, talk to your doctor, pharmacist, or  health care provider. Copyright© 2020 [employer]     Respiratory Syncytial Virus (RSV)  What is respiratory syncytial virus?   Respiratory syncytial virus, or RSV, is a common virus that affects the lungs and breathing passages.  What are the symptoms?   Symptoms of RSV typically include mild cold-like symptoms including runny nose, sore throat, cough, and headache. Sometimes RSV can lead to serious complications including:   Pneumonia (infection of the lungs)  More severe symptoms for people with asthma  More severe symptoms for people with chronic obstructive pulmonary disease (COPD) (a chronic disease of the lungs that makes it hard to breathe)  Congestive heart failure (when the heart can't pump blood and oxygen to the body's tissues)  Older adults who get very sick from RSV may need to be hospitalized. Some may even die. Older adults are at greater risk than young adults for serious complications from RSV because the immune system weakens as one gets older.  Who is most at risk for complications?  RSV infections can be dangerous for infants and certain adults. Those at highest risk for severe RSV infection include:  Infants under 1 year of age  Older adults, especially those 65 years and older  Adults with chronic heart or lung disease  Adults with weakened immune systems  How is it treated?  There is no specific treatment for RSV infection, though researchers are working to develop vaccines and antivirals (medicines that fight viruses).  How can I protect others if I have RSV?  RSV season occurs each year in most regions of the [place] during fall, winter, and spring. If you are at high risk for severe RSV infection, or if you interact with an infant or older adult, you should take extra care to stay healthy:   Wash your hands often - Wash your hands often with soap and water for 20 seconds. If soap and water are not available, use an alcohol-based hand . Washing your hands will help protect you from  germs.  Keep your hands off your face - Avoid touching your eyes, nose, and mouth with unwashed hands. Germs spread this way.   Avoid close contact with sick people - Avoid close contact, such as kissing, and sharing cups or eating utensils with people who have cold-like symptoms.  Cover your coughs and sneezes - Cover your mouth and nose with a tissue when coughing or sneezing. Throw the tissue in the trash afterward.  Clean and disinfect surfaces - Clean and disinfect surfaces that people frequently touch, such as doorknobs. When people infected with RSV touch surfaces and objects, they can leave behind germs. Also, when they cough or sneeze, droplets containing germs can land on surfaces and objects.   Stay home when you are sick - If possible, stay home from work, school, and public areas when you are sick. This will help protect others from catching your illness.    Centers for Disease Control & Prevention (CDC)  RSV Symptoms & Care, https://www.cdc.gov/rsv/factsheet-older-adults.pdf      Uncontrolled type 2 diabetes mellitus with hyperglycemia (HCC) Check blood urine. ? Control. Careful with diet and excercise at least 30 minutes 3-4 times a week. Check sugars at different times on different dates. Careful with low sugars. Carry something with you and check sugar if can. Can carry christel cracker, etc. Decrease carbohydrates. But also, careful with fruits and natural sugars.One serving a day and no more than 1 handful every day. Check feet  every AM and careful with sores and ulcers on feet bilaterally. Check eyes every year with dilated eye exam.  Check sugars.  2-hour postmeal should be less than 140s.  Pre-meal should be 's.  Both equally affected A1c.  Discussed importance of glycemic control to prevent complications of diabetes  -Discussed complications of diabetes include retinopathy, neuropathy, nephropathy and cardiovascular disease  -Discussed ABCs of DM  -Discussed importance of  SBGM  -Discussed importance of low CHO diet, recommend 45gm per meal or 135gm per day  -FU with kei       Vitamin D deficiency Check blood.        Idiopathic gout, unspecified chronicity, unspecified site Stable. .Check blood.        Essential hypertension Higher.  Increase Diovan to 160 in the morning and 80 mg in the evening.  RN only visit in 1 week to check blood pressures.  And then call in 2 weeks after on reading to see if her blood pressures running.Careful with diet and excercise at least 30 minutes 3-4 times a week. Check blood pressures at different times on different days. Can purchase own blood pressure monitor. If not, check at local pharmacy. Bake foods more and grill occasionally. Avoid fried foods. No salt. Use other seasonings.         Other insomnia discussed about options.  Try nortriptyline 20 5 at night.  Discussed with patient of side effects and use of these medications.  Monitor blood pressures.Stick to a sleep schedule. Keep your bedtime and wake time consistent from day to day, including on weekends.   Stay active. Regular activity helps promote a good night's sleep. Schedule exercise at least a few hours before bedtime and avoid stimulating activities before bedtime.   Check your medications. If you take medications regularly, check with your doctor to see if they may be contributing to your insomnia. Also check the labels of OTC products to see if they contain caffeine or other stimulants, such as pseudoephedrine.   Avoid or limit naps. Naps can make it harder to fall asleep at night. If you can't get by without one, try to limit a nap to no more than 30 minutes and don't nap after 3 p.m.   Avoid or limit caffeine and alcohol and don't use nicotine. All of these can make it harder to sleep, and effects can last for several hours.   Avoid large meals and beverages before bed. A light snack is fine and may help avoid heartburn. Drink less liquid before bedtime so that you won't have to  urinate as often.  At bedtime:  Try melatonin 10 mg 30 minutes before bed.  Make your bedroom comfortable for sleep. Only use your bedroom for sex or sleep. Keep it dark and quiet, at a comfortable temperature. Hide all clocks in your bedroom, including your wristwatch and cellphone, so you don't worry about what time it is.   Find ways to relax. Try to put your worries and planning aside when you get into bed. A warm bath or a massage before bedtime can help prepare you for sleep. Create a relaxing bedtime ritual, such as taking a hot bath, reading, soft music, breathing exercises, yoga or prayer.   Avoid trying too hard to sleep. The harder you try, the more awake you'll become. Read in another room until you become very drowsy, then go to bed to sleep. Don't go to bed too early, before you're sleepy.   Get out of bed when you're not sleeping. Sleep as much as you need to feel rested, and then get out of bed. Don't stay in bed if you're not sleeping.         Orders Placed This Encounter   Procedures    Lipid Panel    Comp Metabolic Panel (14)    Hemoglobin A1C    Microalb/Creat Ratio, Random Urine    Vitamin B12    Uric Acid    Prevnar 20 (PCV20) [98888]       Meds This Visit:  Requested Prescriptions     Signed Prescriptions Disp Refills    furosemide 20 MG Oral Tab 90 tablet 1     Sig: Take 1 tablet (20 mg total) by mouth daily.    nortriptyline 25 MG Oral Cap 30 capsule 2     Sig: Take 1 capsule (25 mg total) by mouth nightly.       Imaging & Referrals:  PCV20 VACCINE FOR INTRAMUSCULAR USE  US THYROID (CPT=76536)        Has set follow-up.

## 2024-02-07 LAB
EST. AVERAGE GLUCOSE BLD GHB EST-MCNC: 126 MG/DL (ref 68–126)
HBA1C MFR BLD: 6 % (ref ?–5.7)

## 2024-02-07 NOTE — PROGRESS NOTES
CMP Normal (electrolytes, sugar, kidney and liver functions),   Lipid (choilesterol) is good,   Hemoglobin A1c which is a 3-month average of sugars is better than prior.  Goal is 6.5 or less.  Good job!  Urine shows no protein spillage from the effect of diabetes.  B12 level looks okay.  Continue the same.  Uric acid levels look good.  Vitamin D level is better than prior but still low.  Would add an extra 400 IUs of vitamin D over-the-counter a day to current regimen and recheck vitamin D levels in 3 months.  Orders written for future labs in 3 months.

## 2024-02-13 ENCOUNTER — TELEPHONE (OUTPATIENT)
Dept: INTERNAL MEDICINE CLINIC | Facility: CLINIC | Age: 84
End: 2024-02-13

## 2024-02-13 DIAGNOSIS — G47.09 OTHER INSOMNIA: Primary | ICD-10-CM

## 2024-02-13 RX ORDER — NORTRIPTYLINE HYDROCHLORIDE 10 MG/1
10 CAPSULE ORAL NIGHTLY
Qty: 30 CAPSULE | Refills: 1 | Status: SHIPPED | OUTPATIENT
Start: 2024-02-13

## 2024-02-13 NOTE — TELEPHONE ENCOUNTER
Patient calling ( identified name and  ) checking the status of her message below    She is not sleeping well,sleeps about 2-3 hours per night

## 2024-02-13 NOTE — TELEPHONE ENCOUNTER
Patient states the nortriptyline 25 mg oral caps are not working for her insomnia. Please advise. Patient was seen in the office 2/6/24.

## 2024-02-13 NOTE — TELEPHONE ENCOUNTER
She has had sleep issues for more than a year.  We can try increasing and adding another 10 mg of nortriptyline at night to help her sleep.  We do have to be careful with the nortriptyline.  I also told her that we will probably do a sleep study if she still having issues

## 2024-02-17 NOTE — TELEPHONE ENCOUNTER
Patient contacted and notified.  States she is not taking the medication at all now because it does not help her.  She will schedule sleep study.

## 2024-02-17 NOTE — TELEPHONE ENCOUNTER
Not a good idea to do 3 tablets.  We could try an alternative but we have to give it a little bit more time sometimes medicines take a while to kick in.  I still did recommend if she is not noticing improvement to still schedule a sleep study.  So there is an order in there to do a sleep study.

## 2024-03-07 ENCOUNTER — TELEPHONE (OUTPATIENT)
Dept: INTERNAL MEDICINE CLINIC | Facility: CLINIC | Age: 84
End: 2024-03-07

## 2024-03-07 DIAGNOSIS — E87.6 HYPOKALEMIA: Primary | ICD-10-CM

## 2024-03-07 NOTE — TELEPHONE ENCOUNTER
Would check her potassium level.  Would have pharmacist go over her medications with her and update her med list in the system.

## 2024-03-07 NOTE — TELEPHONE ENCOUNTER
Spoke with pt,  verified  Pt was informed of MD recommendation, pt stated understanding.  Pt stated she wants to get her labs done at [facility] not [facility].   Lab order switched to [facility].

## 2024-03-07 NOTE — TELEPHONE ENCOUNTER
Just FYI [facility] continue [facility] orders and [facility] can do [facility] orders as long as a routine standard orders.  The CPT codes do not change.

## 2024-03-07 NOTE — TELEPHONE ENCOUNTER
Patient contacts clinic regarding refill of potassium.  [facility] pharmacy will not dispense to her.  Verified with pharmacy, she received 180 tablets on 01/02 and earliest refill is 03/17.  Called patient back, she has been taking 2 tablets twice daily rather than one tablet twice daily as instructed.  She currently feels well, denies heart palpitations or chest symptoms.  Dr. Adair, please advise if you would like to check potassium level, patient is now out of medication due to taking it incorrectly.

## 2024-04-15 RX ORDER — ALLOPURINOL 300 MG/1
300 TABLET ORAL DAILY
Qty: 90 TABLET | Refills: 3 | Status: SHIPPED | OUTPATIENT
Start: 2024-04-15

## 2024-04-15 NOTE — TELEPHONE ENCOUNTER
Please review.Protocol failed/ No protocol      Requested Prescriptions   Pending Prescriptions Disp Refills    ALLOPURINOL 300 MG Oral Tab [Pharmacy Med Name: ALLOPURINOL 300MG TABLETS] 90 tablet 1     Sig: TAKE 1 TABLET(300 MG) BY MOUTH DAILY       There is no refill protocol information for this order          Future Appointments         Provider Department Appt Notes    In 1 month Ana Adair MD [facility] medicare px ok per dr disla             Recent Outpatient Visits              2 months ago B12 deficiency    [facility] Ana Adair MD    Office Visit    5 months ago Insomnia, unspecified type    [facility] Alysia Esteves MD    Office Visit    6 months ago Cardiomyopathy, nonischemic (HCC)    [facility]    Nurse Only    9 months ago Essential hypertension    [facility] Ana Adair MD    Office Visit    11 months ago Stage 3b chronic kidney disease (HCC)    [facility] Ana Adair MD    Office Visit

## 2024-05-15 DIAGNOSIS — E87.6 HYPOKALEMIA: ICD-10-CM

## 2024-05-16 ENCOUNTER — TELEPHONE (OUTPATIENT)
Dept: INTERNAL MEDICINE CLINIC | Facility: CLINIC | Age: 84
End: 2024-05-16

## 2024-05-16 NOTE — TELEPHONE ENCOUNTER
Spoke to reception.  We have a cancellation tomorrow that we can put her on for Medicare slot.  However make sure patient is aware to come 15 minutes early before her appointment.  Since she has been chronically late.

## 2024-05-16 NOTE — TELEPHONE ENCOUNTER
Patient states she received a call this morning from our office.  Patient missed her appointment this morning with Dr. Adair.  Patient reports ongoing issues with her legs and trouble sleeping.  Patient scheduled for tomorrow with Dr. Adair due to ongoing symptoms.  Please advise if ok to change to her Medicare visit, originally scheduled for today?

## 2024-05-17 ENCOUNTER — OFFICE VISIT (OUTPATIENT)
Dept: INTERNAL MEDICINE CLINIC | Facility: CLINIC | Age: 84
End: 2024-05-17

## 2024-05-17 VITALS
HEIGHT: 64 IN | SYSTOLIC BLOOD PRESSURE: 144 MMHG | DIASTOLIC BLOOD PRESSURE: 70 MMHG | WEIGHT: 176 LBS | OXYGEN SATURATION: 97 % | HEART RATE: 109 BPM | BODY MASS INDEX: 30.05 KG/M2 | TEMPERATURE: 97 F

## 2024-05-17 DIAGNOSIS — E11.65 UNCONTROLLED TYPE 2 DIABETES MELLITUS WITH HYPERGLYCEMIA (HCC): ICD-10-CM

## 2024-05-17 DIAGNOSIS — E04.2 MULTIPLE THYROID NODULES: ICD-10-CM

## 2024-05-17 DIAGNOSIS — I50.21 ACUTE SYSTOLIC CHF (CONGESTIVE HEART FAILURE), NYHA CLASS 1 (HCC): Primary | ICD-10-CM

## 2024-05-17 DIAGNOSIS — M10.00 IDIOPATHIC GOUT, UNSPECIFIED CHRONICITY, UNSPECIFIED SITE: ICD-10-CM

## 2024-05-17 DIAGNOSIS — I42.8 NONISCHEMIC CARDIOMYOPATHY (HCC): ICD-10-CM

## 2024-05-17 DIAGNOSIS — Z45.02 IMPLANTABLE DEFIBRILLATOR REPROGRAMMING/CHECK: ICD-10-CM

## 2024-05-17 DIAGNOSIS — I10 ESSENTIAL HYPERTENSION: ICD-10-CM

## 2024-05-17 DIAGNOSIS — R74.8 ELEVATED ALKALINE PHOSPHATASE MEASUREMENT: ICD-10-CM

## 2024-05-17 DIAGNOSIS — G47.09 OTHER INSOMNIA: ICD-10-CM

## 2024-05-17 DIAGNOSIS — I42.0 DILATED CARDIOMYOPATHY (HCC): ICD-10-CM

## 2024-05-17 DIAGNOSIS — M51.37 DEGENERATION OF LUMBAR OR LUMBOSACRAL INTERVERTEBRAL DISC: ICD-10-CM

## 2024-05-17 DIAGNOSIS — K59.01 SLOW TRANSIT CONSTIPATION: ICD-10-CM

## 2024-05-17 DIAGNOSIS — N18.31 STAGE 3A CHRONIC KIDNEY DISEASE (HCC): Chronic | ICD-10-CM

## 2024-05-17 DIAGNOSIS — I27.20 PULMONARY HYPERTENSION (HCC): ICD-10-CM

## 2024-05-17 DIAGNOSIS — E78.2 MIXED HYPERLIPIDEMIA: ICD-10-CM

## 2024-05-17 DIAGNOSIS — I34.0 NONRHEUMATIC MITRAL VALVE REGURGITATION: ICD-10-CM

## 2024-05-17 DIAGNOSIS — I35.9 AORTIC VALVE DISORDER: ICD-10-CM

## 2024-05-17 DIAGNOSIS — R76.8 ANA POSITIVE: ICD-10-CM

## 2024-05-17 DIAGNOSIS — E53.8 B12 DEFICIENCY: ICD-10-CM

## 2024-05-17 DIAGNOSIS — E55.9 VITAMIN D DEFICIENCY: ICD-10-CM

## 2024-05-17 DIAGNOSIS — Z00.00 ENCOUNTER FOR ANNUAL HEALTH EXAMINATION: ICD-10-CM

## 2024-05-17 DIAGNOSIS — I44.7 LEFT BUNDLE BRANCH BLOCK (LBBB): ICD-10-CM

## 2024-05-17 DIAGNOSIS — C73 PAPILLARY THYROID CARCINOMA (HCC): ICD-10-CM

## 2024-05-17 DIAGNOSIS — Z71.85 VACCINE COUNSELING: ICD-10-CM

## 2024-05-17 RX ORDER — MONTELUKAST SODIUM 10 MG/1
10 TABLET ORAL NIGHTLY
Qty: 30 TABLET | Refills: 3 | Status: SHIPPED | OUTPATIENT
Start: 2024-05-17

## 2024-05-17 RX ORDER — VALSARTAN 160 MG/1
TABLET ORAL
Qty: 180 TABLET | Refills: 0 | Status: SHIPPED | OUTPATIENT
Start: 2024-05-17

## 2024-05-17 RX ORDER — POTASSIUM CHLORIDE 20 MEQ/1
20 TABLET, EXTENDED RELEASE ORAL 2 TIMES DAILY
Qty: 180 TABLET | Refills: 3 | Status: SHIPPED | OUTPATIENT
Start: 2024-05-17

## 2024-05-17 NOTE — PATIENT INSTRUCTIONS
Patient Active Problem List   Diagnosis    Migraine Stable.       Elevated alkaline phosphatase measurement Resolved.       Mixed hyperlipidemia Stable.       B12 deficiency Stable.       Degeneration of lumbar or lumbosacral intervertebral disc Stable.       Aortic valve disorder Stable on echo 12-23.       Essential hypertension Higher.  Increase valsartan to 160 mg to 1 tablet in the morning and half a tablet or 80 mg in the evening.  Call in 1 to 2 weeks with blood pressures.Careful with diet and excercise at least 30 minutes 3-4 times a week. Check blood pressures at different times on different days. Can purchase own blood pressure monitor. If not, check at local pharmacy. Bake foods more and grill occasionally. Avoid fried foods. No salt. Use other seasonings.        Multiple thyroid nodules check thyroid ultrasound.  Check thyroglobulin tumor markers.      Vitamin D deficiency check blood.      Vaccine counseling holding on shingles vaccine.      GARY positive not lupus.      Hypokalemia check blood.      Nonischemic cardiomyopathy (HCC) clinically stable.      Pulmonary hypertension (HCC) Stable.       Acute systolic CHF (congestive heart failure), NYHA class 1 (HCC) euvolemic.  Follow-up with cardiology.      Dilated cardiomyopathy (HCC) stable.      Nonrheumatic mitral valve regurgitation stable.      Other insomnia discussed about options.  Had tried several medications in the past.  Will try Singulair for the cough 10 mg at night.  Discussed about side effects and use.  Stop if no helpful after 1 week.  Check sleep study.      Slow transit constipation add Benefiber 1 teaspoon a day.      Gout no exacerbations. Stable.             No motrin, ibuprofen, advil, alleve, naprosyn  with these medications.  Check blood.      Implantable defibrillator reprogramming      Uncontrolled type 2 diabetes mellitus with hyperglycemia (HCC) stable.Careful with diet and excercise at least 30 minutes 3-4 times a week.  Check sugars at different times on different dates. Careful with low sugars. Carry something with you and check sugar if can. Can carry christel cracker, etc. Decrease carbohydrates. But also, careful with fruits and natural sugars.One serving a day and no more than 1 handful every day. Check feet  every AM and careful with sores and ulcers on feet bilaterally. Check eyes every year with dilated eye exam.  Check sugars.  2-hour postmeal should be less than 140s.  Pre-meal should be 's.  Both equally affected A1c.  Discussed importance of glycemic control to prevent complications of diabetes  -Discussed complications of diabetes include retinopathy, neuropathy, nephropathy and cardiovascular disease  -Discussed ABCs of DM  -Discussed importance of SBGM  -Discussed importance of low CHO diet, recommend 45gm per meal or 135gm per day  -Normal foot exam  -Follow-up with ophthalmology.       Papillary thyroid carcinoma (HCC)  Status post surgery.  Check blood and ultrasound.      Left bundle branch block (LBBB) stable.  Follow-up with cardiology.     Cough.  Try Singulair 10 mg at night discussed about side effects and use.  Call if no better after 1 week.       ASSESSMENT/PLAN:     Encounter Diagnoses   Name Primary?    Acute systolic CHF (congestive heart failure), NYHA class 1 (HCC) Yes    GARY positive     Aortic valve disorder     B12 deficiency     Stage 3a chronic kidney disease (HCC)     Degeneration of lumbar or lumbosacral intervertebral disc     Dilated cardiomyopathy (HCC)     Elevated alkaline phosphatase measurement     Essential hypertension     Idiopathic gout, unspecified chronicity, unspecified site     Papillary thyroid carcinoma (HCC)     Other insomnia     Nonrheumatic mitral valve regurgitation     Nonischemic cardiomyopathy (HCC)     Multiple thyroid nodules     Implantable defibrillator reprogramming/check     Left bundle branch block (LBBB)     Mixed hyperlipidemia     Pulmonary hypertension  (HCC)     Slow transit constipation     Vitamin D deficiency     Vaccine counseling     Uncontrolled type 2 diabetes mellitus with hyperglycemia (HCC)     Encounter for annual health examination        No orders of the defined types were placed in this encounter.      Meds This Visit:  Requested Prescriptions      No prescriptions requested or ordered in this encounter       Imaging & Referrals:  [facility] REFERRAL DIAGOSTIC SLEEP STUDY ADULT      Return to clinic in 4 months extended.  Follow-up on blood pressure.    Understanding Advance Care Planning  Advance care planning is the process of deciding one’s own future medical care. It helps assure that if you can’t speak for yourself, your wishes can still be carried out. The plan is a series of legal documents that note a person’s wishes. The documents vary by state. Advance care planning should be discussed at a regular office visit with your primary care provider before an acute illness. Advance care planning is encouraged when a person has a serious illness that is expected to get worse. It may also be done before major surgery. And it can help you and your family be prepared in case of a major illness or injury. Advance care planning helps with making decisions at these times.     Who will speak on your behalf?  A healthcare proxy is a person who acts as the voice of a patient when the patient can’t speak for himself or herself. The name of this role varies by state. It may be called a Durable Medical Power of  or Durable Power of  for Healthcare. It may be called an agent, surrogate, or advocate. Or it may be called a representative or decision maker. It's an official duty that is identified by a legal document. The document also varies by state.   Why is advance care planning important?   If a person communicates his or her healthcare wishes:   He or she will be given medical care that matches his or her values and goals.  Family members  will not be forced to make decisions in a crisis with no guidance.  Creating a plan  Making an advance care plan is often done in 3 steps:   Thinking about one’s wishes. To create an advance care plan, think about what kind of medical treatment you would want if you lose the ability to communicate. Are there any situations in which you would refuse or stop treatment? Are there therapies you would want or not want? And whom do you want to make decisions for you? There are many places to learn more about how to plan for your care. Ask your healthcare provider or  for resources.  Picking a healthcare proxy. This means choosing a trusted person to speak for you only when you can’t speak for yourself. When you can't make medical decisions, your proxy makes sure the instructions in your advance care plan are followed. A proxy doesn't make decisions based on his or her own opinions. They must put aside those opinions and values if needed, and carry out your wishes.  Filling out the legal documents. There are several kinds of legal documents for advance care planning. Each one tells healthcare providers your wishes. The documents may vary by state. They must be signed and may need to be witnessed or notarized. You can cancel or change them whenever you wish. Depending on your state, the documents may include a Healthcare Proxy form, Living Will, Durable Medical Power of , and Advance Directive.  The family’s role  The best help a family can give is to support their loved one’s wishes. Open and honest communication is vital. Family should express any concerns they have about the patient’s choices while the patient can still make decisions in the event that his or her illness prevents communicating those wishes at a later time.    Ludy last reviewed this educational content on 12/1/2019    © 2445-7669 [employer]. All rights reserved. This information is not intended as a substitute for  professional medical care. Always follow your healthcare professional's instructions.          Advance Medical Directive  An advance medical directive is a form that lets you plan ahead for the care you’d want if you could no longer express your wishes. This statement outlines the medical treatment you’d want or names the person you’d wish to make healthcare decisions for you. Be aware that laws vary from state to state, and it may be worthwhile to talk with an .     Writing down your wishes  An advance directive is important whether you’re young or old. Injury or illness can strike at any age.  Decide what is important to you and the kind of treatment you’d want, or not want to have.  Some states allow only one kind of advance directive. Some let you do both a durable power of  for healthcare and a living will. Some states put both kinds on the same form.    A durable power of  (POA) for healthcare   This form lets you name someone else that you have chosen and trust to speak and make decisions on your behalf.  This person can decide on treatment for you only when you can’t speak for yourself.  You don't need to be at the end of your life. They could speak for you if you were in a coma but were likely to recover.    A living will  This form lets you list the care you want at the end of your life.  A living will applies only if you won’t live without medical treatment. It would apply if you had advanced cancer, a massive stroke, or other serious illness from which you will not recover.  It takes effect only when you can no longer express your wishes yourself.    [employer] last reviewed this educational content on 2/1/2021 © 2000-2021 [employer]. All rights reserved. This information is not intended as a substitute for professional medical care. Always follow your healthcare professional's instructions.          Choosing an Agent  A durable power of  for healthcare is  only as good as the person you name to be your agent. Your agent is the person you have chosen to speak and make decisions on your behalf. If this person knows your treatment wishes and is willing to carry them out, you’ll probably be well represented. Be sure to tell your agent what’s important to you.     Who to choose  Here are suggestions for choosing an agent:  You can name a family member, close friend, , , or rabbi.  You should name one person as your agent. Then name one or two alternates. You need a backup person in case your first choice can’t be reached when needed.  Talk with each person you're thinking of naming as your agent or alternate. Do this before you decide who should carry out your wishes.  Your agent should be ...  A competent adult, age 18 or older  Someone you trust and can talk to about the care you want and what's important to you  Someone who supports your treatment choices    In many states, your agent can’t be ...   Your healthcare provider  An employee of your provider or of a hospital, nursing home, or hospice program where you receive care  Some states have other restrictions on who can be named as an agent for an advance directive.   Be prepared  Tip: It's a good idea to write down your wishes and give a copy to your agent and all others who are involved with your healthcare.   Ludy last reviewed this educational content on 8/1/2021    © 0556-4093 [employer]. All rights reserved. This information is not intended as a substitute for professional medical care. Always follow your healthcare professional's instructions.          Understanding DNR Orders  Do not resuscitate (DNR) orders tell hospital staff not to do potentially life-restoring measures, such as CPR, if you or your loved one's heart and lungs stop working. It allows a natural death, and prevents healthcare staff from artificially reviving a person and placing them on life support. In many  states, a DNR order also applies to staff outside the hospital such as in nursing homes and emergency medical services. A DNR order must be written by a healthcare provider. Or in some cases, certain other healthcare workers write it. This can only be done with the person’s or family’s consent. If a person has not written an advance directive, their family will decide on a DNR with the help of the healthcare team.   The person can cancel a DNR order at any time. The healthcare team can answer questions about the DNR form. Have copies of a DNR form are readily available so that your wishes can be faithfully followed.   Writing a DNR order  When might a DNR order be written? When the person’s health condition is such that, in the case of cardiac arrest, CPR and other resuscitation methods are not desired. This could be because the chance of successful resuscitation is very low. Or it could be because the care plan now focuses on comfort measures instead of life-sustaining measures. Coma and terminal illness are instances when a DNR order might be used.   Irreversible coma  In a coma, a person does not respond to sight, sound, or touch. The heart and lungs could be working, but brain function is damaged due to trauma or disease.   Terminal illness  In the last stages of heart disease, AIDS, cancer, and other illnesses, some people don’t want to prolong their suffering. If recovery isn’t likely and quality of life is poor or getting worse, a person or their family may agree to a DNR order.   DNR orders and hospice care  A hospice program can offer care during the final weeks of life. Hospice programs provide dignity, pain control and comfort care in the home or at special facilities. Hospice does not provide aggressive treatment. In fact, a DNR order will likely be discussed before a person is admitted to hospice. A  or  may be able to help you arrange for hospice support.   Documenting  end-of-life wishes  In addition to DNR orders, a person with a serious, life-limiting illness may wish to document their treatment wishes. This is called a POST form or by different names, depending on the state. It's meant to provide a portable medical order to help guide healthcare providers on the specific medical treatments a person wants during a medical emergency. It's meant to complement a DNR order, not replace it. Your healthcare provider can tell you more and help you complete the forms. The form may be called one of these:   MOLST (medical orders for life-sustaining treatment)  POLST (physician orders for life-sustaining treatment)  MOST (medical orders for scope of treatment)  POST (physician orders for scope of treatment)  TPOPP (transportable physician orders for patient preferences)  Ludy last reviewed this educational content on 7/1/2021 © 2000-2021 [employer]. All rights reserved. This information is not intended as a substitute for professional medical care. Always follow your healthcare professional's instructions.          An Agent’s Role for Durable Power of  for Health Care   It’s impossible to know which medical treatment choices you might face in the future. What if you aren't able to make these decisions for yourself? A durable power of  for healthcare lets you name an agent to speak and carry out your wishes on your behalf. This happens only if you can’t express your wishes yourself.     An agent’s duty  Your agent respects your wishes in the following ways:    Your agent’s duty is to see that your wishes are followed.  If your wishes aren’t known, your agent should try to decide what you want.  Your agent’s choices come before anyone else’s wishes for you.  A durable power of  for healthcare doesn't not give your agent control over your money . Your agent also can’t be made to pay your bills.  Find out what your agent can do  Restrictions on  what an agent can and can’t do vary by state. Check your state laws. In most states your agent can:   Choose or refuse life-sustaining and other medical treatment on your behalf  Consent to treatment, and then stop treatment if your condition doesn’t improve  Access and release your medical records  Request an autopsy and donate your organs, unless you’ve stated otherwise in your advance directive  Find out whether your state allows your agent to do the following:   Refuse or withdraw life-enhancing care  Refuse or stop tube feeding or other life-sustaining care--even if you haven’t stated on your advance directive that you don’t want these treatments  Order sterilization or   [employer] last reviewed this educational content on 2021 [employer]. All rights reserved. This information is not intended as a substitute for professional medical care. Always follow your healthcare professional's instructions.          Being a Healthcare Proxy  A healthcare proxy is someone who represents a person who can’t speak for themself. The name of this role varies by state. It may be called a Durable Medical Power of . It may be called a Durable Power of  for Healthcare. It may be called an agent, surrogate, or advocate. Or it may be called a representative or decision maker. It's an official duty that is noted by a legal document. The document also varies by state. The person must name you as his or her proxy on the document.      A healthcare proxy speaks for another person when he or she is not able to do so. The proxy helps make sure the person’s healthcare wishes are known and followed.     What it means to be a healthcare proxy   Your role as healthcare proxy starts when the person can’t make medical decisions. This assessment can only be made by a licensed doctor. You then make the healthcare decisions as needed. You do this by carrying out the person’s wishes. These  wishes are noted in his or her advance care planning documents. These declare what kind of treatment the person wishes to have or not have. You may need to put aside your own values and opinions to carry out the person’s wishes. This may include refusing or stopping life-sustaining treatments.   Documenting end-of-life wishes   As a healthcare proxy, encourage the person to discuss his or her wishes, while they are able. They can do this with their healthcare provider and then document the wishes as a medical order. The provider can help the person complete the form. The forms are known by different names depending on the state. The form may be called one of these:     MOLST (medical orders for life-sustaining treatment)  POLST (physician orders for life-sustaining treatment)  MOST (medical orders for scope of treatment)  POST (physician orders for scope of treatment)  TPOPP (transportable physician orders for patient preferences)    The form documents the person’s wishes at the end of life. It's not tied to a certain healthcare provider or facility. It's different than a living will. The form is an order written according to state regulations by a healthcare provider. To complete one, the person must express his or her wishes to an advanced healthcare provider. If the person can’t make his or her own decisions, then this is done by the person’s healthcare proxy.   Carrying out your role  Your duties depend on what the person’s advance care planning documents say. Your duties may also depend on state law. In general:   Before accepting a role as a proxy, talk with the person. Be sure you know his or her wishes. Ask questions. This will help you be his or her voice if and when it is needed.  Be sure that the person’s healthcare team knows that you are his or her proxy. Carry a copy of the document and proof of your identity.  Make sure the healthcare team has a copy of the advance care planning documents.  Talk to  the healthcare team. Ask questions as often as you need. Stay informed about the person’s condition.  Ask for any help you need to understand the medical situation. Ask about the person’s condition and prognosis. Ask about risks and benefits of tests and treatments. Find out all the facts and options.  Speak on the person’s behalf with the healthcare team when needed.  Talk with family members and keep them informed.  Know your rights. You have the right to ask for information. You can ask for consultations and second opinions. You have the right to request or refuse treatment for the person. You may be able to review his or her medical chart. You can authorize the person’s transfer to another facility. You can also request a new healthcare provider for him or her. If you are not sure what your rights are at any time, ask a .  When it’s time to make decisions  If the person’s wishes are clear in the advance care plan documents, ask for them to be carried out as noted. If they are not clear, talk with the healthcare team. Listen to the team’s recommendations. Talk with a  or counselor. It may be hard for you to make a decision at times. You may feel sad or upset about a decision. Being a healthcare proxy is not an easy role. But it's an important one. Remember that the person trusts you to carry out his or her wishes.   If you need help  Ask the healthcare team if you have trouble with a decision. The healthcare team will help you.  Encourage the person you are helping to have a conversation with their provider about their end-of-life wishes. The provider can help them fill out the form.  You may need help in resolving family conflicts. Ask the hospital or clinic , ethics consultant, or a  for help.  If you are having trouble talking with the healthcare team while the person is in the hospital, reach out to the patient relations department. Or ask to speak  to the [facility] or ethics committee.    Ludy last reviewed this educational content on 9/1/2019    © 7013-6265 [employer]. All rights reserved. This information is not intended as a substitute for professional medical care. Always follow your healthcare professional's instructions.          Life Support  If you understand how specific treatments may affect your quality of life, you can decide which ones you’d choose or refuse. You may want to talk to your healthcare provider about the possible benefits and risks of treatments. The chance of good results from these therapies varies based on each individual clinical situation and can be very hard to predict. Medical treatment, if your life is in danger, falls into 3 main categories.     Life supporting  This care keeps your heart and lungs going when they can no longer work on their own.  CPR restarts your heart and lungs if they stop working.  A respirator (or ventilator) keeps you breathing. Air is pumped into your lungs through a tube that’s put into your windpipe.    Life sustaining  This care keeps you alive longer when you have an illness that can’t be cured.  Tube feeding or TPN (total parenteral nutrition) provides food and fluids through a tube or IV (intravenous). It is given if you can’t chew or swallow on your own.  Dialysis is a kidney machine that cleans your blood when your kidneys can no longer work on their own.    Life enhancing  This care controls pain and discomfort, such as nausea or trouble breathing. This type of care is not designed to prolong your life, but to enhance comfort and quality of life. Nothing is done to keep you alive longer.  Hospice care is comfort care. It might provide food and fluids by mouth or help with bathing. Hospice care is given during the last stages of a terminal illness.  Strong pain medicine can be given to help keep you comfortable.    Do Not Resuscitate (DNR)  Would you want CPR if  your heart stops while you’re a patient in a hospital or nursing home? If not, talk to your healthcare provider about issuing a DNR (Do-Not-Resuscitate) order.   DNRs and advance directives may not apply during anesthesia, in emergency rooms, or when emergency medical teams respond to a 911 call. Ask your healthcare provider how you can make sure your wishes will be followed. Also, a DNR will not prevent you from getting other kinds of needed medical care such as treatment for pain, or bleeding.   [employer] last reviewed this educational content on 8/1/2021 © 2000-2021 [employer]. All rights reserved. This information is not intended as a substitute for professional medical care. Always follow your healthcare professional's instructions.          Stopping Life-Sustaining Treatments  Certain treatments can help sustain life when you have a serious illness. But as your illness progresses, there may come a time when these treatments are no longer a benefit. Decisions must then be made whether to continue or stop these treatments. This can be a hard task for you and your loved ones, but know that you’re not alone. Your healthcare provider and healthcare team will guide you through these treatment decisions. They will also help answer any questions you may have.     What are life-sustaining treatments?  Life-sustaining treatments help keep you alive if a vital body function fails. These treatments can include CPR and the use of machines to help with heart, lung, or kidney function. They can also include the use of tubes to deliver food, fluids, blood, and medicines to the body. Blood transfusions and antibiotics are also types of life-sustaining treatments.   What happens if life-sustaining treatments are continued?  These treatments can help extend your life. But they will not cure your illness. If you are near the end of your life, you may find it hard to handle the side effects and problems that can  occur with these treatments. In this case, the treatments may be too much of a burden on your body. They may cause more harm than good. They may also disrupt the natural dying process and prolong suffering.   What happens if life-sustaining treatments are stopped?  If these treatments are stopped, the focus of treatment will shift to comfort care. This involves measures to control pain and other symptoms you may have. These measures are not meant to cure your illness or help you live longer. Instead, they are meant to improve your quality of life during the time you have left. If you are in the end stage of your illness, you may be referred to hospice by your healthcare provider. Hospice provides end-of-life care. This includes emotional, spiritual, and social support for you and your loved ones.   How do I decide whether to stop life-sustaining treatments?  Your healthcare provider and healthcare team will talk with you about the specific treatments that are part of your care plan. If you want, you may include family and friends in these meetings. Here are some questions to think about or ask your healthcare team:   Is there is a chance that my illness will improve? Or will it continue to get worse?  What are my goals of care? Do I want to extend the time I have left? Or do I want to focus my care on comfort and managing symptoms?  How will stopping or continuing these treatments affect my health? Will they enhance my comfort and quality of life? Or will they cause more problems?  Consider your own values or serge. Also ask for advice from those who share your values.  How do I state my decisions about life-sustaining treatments?  Once you’ve made your decision to continue or stop specific treatments, you can tell your healthcare provider directly. It's best to also put your treatment wishes in writing with advance directives. These are legal forms related to healthcare decisions. Laws about advance directives vary  from state to state. Ask your healthcare provider about what forms are needed to make sure your wishes will be followed. Some common forms include:   A durable power of  for healthcare or a healthcare proxy form.  This form lets you name a person to make treatment decisions for you when you can’t. This person is often called a healthcare proxy, medical or healthcare power of , or agent.  A living will.  This form tells others the kinds of treatment you want or don’t want if you become too ill or injured to speak for yourself.  Orders for life-sustaining treatments.  These are actual healthcare provider's orders that must be followed by other medical providers. The form belongs to you, not to the healthcare provider or hospital.). These are legal forms obtained from your healthcare provider or hospital that document your wishes. The forms are known by different names depending on the state. Common names include:  MOLST (medical orders for life-sustaining treatment)  POLST (physician orders for life-sustaining treatment)  MOST (medical orders for scope of treatment)  POST (physician orders for scope of treatment)  TPOPP (transportable physician orders for patient preferences)     Keep in mind that you can change or cancel an advance directive at any time. Make it a practice to review your decisions each time there is a change in your health or goals of care. Also be sure to tell your healthcare proxy and loved ones of any changes in your decisions.   Deciding whether to stop life-sustaining treatments for a loved one   The decision to stop treatment ideally is made with the person’s consent. If the person is not capable of making decisions and has no advance directive, the decision falls to the person’s healthcare proxy or other adult. If you need to make a decision about stopping treatment for a loved one, start by talking to his or her healthcare provider. Review the goals of care and the benefits  and burdens of specific treatments on your loved one’s health. Also think about your loved one’s wishes and values. If needed, seek advice from other healthcare team members, like a  or .   [employer] last reviewed this educational content on 12/1/2019    © 7595-5942 [employer]. All rights reserved. This information is not intended as a substitute for professional medical care. Always follow your healthcare professional's instructions.   Rozina Haynes's SCREENING SCHEDULE   Tests on this list are recommended by your physician but may not be covered, or covered at this frequency, by your insurer.   Please check with your insurance carrier before scheduling to verify coverage.   PREVENTATIVE SERVICES FREQUENCY &  COVERAGE DETAILS LAST COMPLETION DATE   Diabetes Screening    Fasting Blood Sugar /  Glucose    One screening every 12 months if never tested or if previously tested but not diagnosed with pre-diabetes   One screening every 6 months if diagnosed with pre-diabetes Lab Results   Component Value Date    GLU 81 02/06/2024        Cardiovascular Disease Screening    Lipid Panel  Cholesterol  Lipoprotein (HDL)  Triglycerides Covered every 5 years for all Medicare beneficiaries without apparent signs or symptoms of cardiovascular disease Lab Results   Component Value Date    CHOLEST 149 02/06/2024    HDL 52 02/06/2024    LDL 70 02/06/2024    TRIG 162 (H) 02/06/2024         Electrocardiogram (EKG)   Covered if needed at Welcome to Medicare, and non-screening if indicated for medical reasons 12/24/2023      Ultrasound Screening for Abdominal Aortic Aneurysm (AAA) Covered once in a lifetime for one of the following risk factors    Men who are 65-75 years old and have ever smoked    Anyone with a family history -     Colorectal Cancer Screening  Covered for ages 50-85; only need ONE of the following:    Colonoscopy   Covered every 10 years    Covered every 2 years if patient is at  high risk or previous colonoscopy was abnormal 02/19/2016    No recommendations at this time    Flexible Sigmoidoscopy   Covered every 4 years -    Fecal Occult Blood Test Covered annually -   Bone Density Screening    Bone density screening    Covered every 2 years after age 65 if diagnosed with risk of osteoporosis or estrogen deficiency.    Covered yearly for long-term glucocorticoid medication use (Steroids) Last Dexa Scan:    XR DEXA BONE DENSITOMETRY (CPT=77080) 08/15/2016      No recommendations at this time   Pap and Pelvic    Pap   Covered every 2 years for women at normal risk; Annually if at high risk -  No recommendations at this time    Chlamydia Annually if high risk -  No recommendations at this time   Screening Mammogram    Mammogram     Recommend annually for all female patients aged 40 and older    One baseline mammogram covered for patients aged 35-39 -    No recommendations at this time    Immunizations    Influenza Covered once per flu season  Please get every year -  No recommendations at this time    Pneumococcal Each vaccine ([id number] & [id number]) covered once after 65 Prevnar 13: 01/19/2016    [id number]: 03/12/2018     No recommendations at this time    Hepatitis B One screening covered for patients with certain risk factors   -  No recommendations at this time    Tetanus Toxoid Not covered by Medicare Part B unless medically necessary (cut with metal); may be covered with your pharmacy prescription benefits -    Tetanus, Diptheria and Pertusis TD and TDaP Not covered by Medicare Part B -  No recommendations at this time    Zoster Not covered by Medicare Part B; may be covered with your pharmacy  prescription benefits -  Zoster Vaccines(1 of 2) Never done     Diabetes      Hemoglobin A1C Annually; if last result is elevated, may be asked to retest more frequently.    Medicare covers every 3 months Lab Results   Component Value Date     02/06/2024    A1C 6.0 (H) 02/06/2024        No recommendations at this time    Creat/alb ratio Annually Lab Results   Component Value Date    MICROALBCREA 2.6 02/06/2024       LDL Annually Lab Results   Component Value Date    LDL 70 02/06/2024       Dilated Eye Exam Annually Last Diabetic Eye Exam:  No data recorded  No data recorded       Annual Monitoring of Persistent Medications (ACE/ARB, digoxin diuretics, anticonvulsants)    Potassium Annually Lab Results   Component Value Date    K 4.2 02/06/2024         Creatinine   Annually Lab Results   Component Value Date    CREATSERUM 0.91 02/06/2024         BUN Annually Lab Results   Component Value Date    BUN 16 02/06/2024       Drug Serum Conc Annually No results found for: \"DIGOXIN\", \"DIG\", \"VALP\"

## 2024-05-17 NOTE — TELEPHONE ENCOUNTER
Please review. Protocol Failed; No Protocol    Requested Prescriptions   Pending Prescriptions Disp Refills    POTASSIUM CHLORIDE 20 MEQ Oral Tab CR [Pharmacy Med Name: POTASSIUM CL 20MEQ ER TABLETS] 180 tablet 1     Sig: TAKE 1 TABLET(20 MEQ) BY MOUTH TWICE DAILY       There is no refill protocol information for this order              Recent Outpatient Visits              Today Acute systolic CHF (congestive heart failure), NYHA class 1 ([facility])    [facility] Delicia Forrest Maggie E., MD    Office Visit    3 months ago [facility]    [facility]    6 months ago [facility], unspecified type    [facility]    Office Visit    8 months ago Cardiomyopathy, nonischemic ([facility])    [facility]    Nurse Only    10 months ago Essential hypertension    [facility]

## 2024-05-17 NOTE — PROGRESS NOTES
HPI:    Patient ID: Rozina Haynes is a 83 year old female.    Rozina Haynes is a 83 year old female who presents for a complete physical exam.   Rozina Haynes is a 83 year old female who presents for a Medicare Assessment.     Chief Complaint   Patient presents with    Annual     Medicare Visit        Patient here for follow up of Diabetes.  Has been taking medications regularly.    Checks sugars 1 times daily.  Fasting sugars average not check. No lows.    Watches diabetic diet, low salt.  Diabetic Flow sheet      Latest Ref Rng & Units 5/17/2024    12:32 PM 5/17/2024    11:29 AM 5/17/2024    11:27 AM 2/6/2024     2:38 PM   DIABETIC FLOWSHEET (Counts include 234 beds at the [facility])   BMI    30.21 kg/m2    Hgb A1c <5.7 %    6.0    Cholesterol Total <200 mg/dL    149    Triglycerides 30 - 149 mg/dL    162    HDL 40 - 59 mg/dL    52    LDL <100 mg/dL    70    Microalbumin Urine mg/dL    0.40    Microalb/Creatinine Calc <=30.0 ug/mg    2.6    HEMOGLOBIN A1c <5.7 %    6.0    Valsartan  160 mg Daily OR    160 mg Daily OR     160 mg Daily OR     160 mg Daily OR       Weight (enc vitals)    176 lb    BP (enc vitals)    144/70    PHQ2 Score   2         Patient-reported medication      Last eye exam [facility]. Not recent. Has to schedule.   Checks feet nightly, no open sores     Hypertension  Patient is here for follow up of hypertension. BP at home: Same. Different times.   Has been compliant with medications.  Exercise level: somewhat active and has not been following low salt diet.  Weight has been stable.  Cooks more. Adds salt.   Wt Readings from Last 3 Encounters:   05/17/24 176 lb (79.8 kg)   02/06/24 177 lb 9.6 oz (80.6 kg)   12/22/23 169 lb 12.1 oz (77 kg)     BP Readings from Last 3 Encounters:   05/17/24 144/70   02/06/24 155/77   12/23/23 146/44     Labs:   Lab Results   Component Value Date/Time    GLU 81 02/06/2024 02:38 PM     02/06/2024 02:38 PM    K 4.2 02/06/2024 02:38 PM     02/06/2024 02:38 PM    CO2 28.0 02/06/2024 02:38 PM     CREATSERUM 0.91 02/06/2024 02:38 PM    CA 9.2 02/06/2024 02:38 PM    AST 19 02/06/2024 02:38 PM    ALT 14 02/06/2024 02:38 PM    TSH 3.050 02/06/2024 02:38 PM    T4F 1.2 02/06/2024 02:38 PM        Lab Results   Component Value Date/Time    CHOLEST 149 02/06/2024 02:38 PM    HDL 52 02/06/2024 02:38 PM    TRIG 162 (H) 02/06/2024 02:38 PM    LDL 70 02/06/2024 02:38 PM    NONHDLC 97 02/06/2024 02:38 PM          Labs:   Lab Results   Component Value Date/Time    WBC 9.6 12/23/2023 12:31 PM    HGB 13.3 12/23/2023 12:31 PM    .0 12/23/2023 12:31 PM      Lab Results   Component Value Date/Time    GLU 81 02/06/2024 02:38 PM     02/06/2024 02:38 PM    K 4.2 02/06/2024 02:38 PM     02/06/2024 02:38 PM    CO2 28.0 02/06/2024 02:38 PM    CREATSERUM 0.91 02/06/2024 02:38 PM    CA 9.2 02/06/2024 02:38 PM    ALB 4.1 02/06/2024 02:38 PM    TP 7.3 02/06/2024 02:38 PM    ALKPHO 138 02/06/2024 02:38 PM    AST 19 02/06/2024 02:38 PM    ALT 14 02/06/2024 02:38 PM    BILT 0.7 02/06/2024 02:38 PM    TSH 3.050 02/06/2024 02:38 PM    T4F 1.2 02/06/2024 02:38 PM        Lab Results   Component Value Date/Time    CHOLEST 149 02/06/2024 02:38 PM    HDL 52 02/06/2024 02:38 PM    TRIG 162 (H) 02/06/2024 02:38 PM    LDL 70 02/06/2024 02:38 PM    NONHDLC 97 02/06/2024 02:38 PM       Lab Results   Component Value Date/Time    A1C 6.0 (H) 02/06/2024 02:38 PM      Lab Results   Component Value Date    VITD 24.7 (L) 02/06/2024         No recommendations at this time    Allergies:  Allergies   Allergen Reactions    Erythromycin HIVES     Current Outpatient Medications   Medication Sig Dispense Refill    valsartan 160 MG Oral Tab Take 1 tablet (160 mg total) by mouth daily.      potassium chloride 20 MEQ Oral Tab CR Take 1 tablet (20 mEq total) by mouth 2 (two) times daily. 180 tablet 1    triamcinolone 0.1 % External Cream Apply 1 Application topically 2 (two) times daily. 45 g 0    levothyroxine 150 MCG Oral Tab Take 1 tablet (150  mcg total) by mouth before breakfast. 90 tablet 1    Omeprazole 40 MG Oral Capsule Delayed Release Take 1 capsule (40 mg total) by mouth daily. 90 capsule 1    Vitamin B12 100 MCG Oral Tab Take 1 tablet (100 mcg total) by mouth daily.      Cholecalciferol 50 MCG (2000 UT) Oral Cap Take 2,000 Units by mouth daily.        aspirin 81 MG Oral Tab Take 1 tablet (81 mg total) by mouth daily.      metoprolol succinate  MG Oral Tablet 24 Hr Take 1 tablet (100 mg total) by mouth daily.        Past Medical History:    Anxiety state, unspecified    Degeneration of lumbar or lumbosacral intervertebral disc    Gout    IBS (irritable bowel syndrome)    Left bundle branch block (LBBB) on electrocardiogram    AbNL regaadenosine. CTA shows mild soft plaque RCA calcium score zero.     Migraine    Papillary thyroid carcinoma (HCC)    TIA (transient ischemic attack)      Past Surgical History:   Procedure Laterality Date    Cholecystectomy      Colonoscopy      Other surgical history      LASER SURGERY ON GALL BLADDER STONES    Thyroidectomy  8/31/15    Total thyroidectomy. 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     Upper gi endoscopy,exam        Family History   Problem Relation Age of Onset    Hypertension Mother     Heart Disease Mother 40        CAD S/P MI.     Cancer Brother         Lung cancer. Construction.       Social History:  Social History     Socioeconomic History    Marital status:    Tobacco Use    Smoking status: Never    Smokeless tobacco: Never   Vaping Use    Vaping status: Never Used   Substance and Sexual Activity    Alcohol use: Yes     Comment: occ    Drug use: No   Other Topics Concern     Service No    Blood Transfusions No    Caffeine Concern Yes     Comment: sodas 3-4 cans daily    Occupational Exposure No    Hobby Hazards No    Sleep Concern Yes     Comment: has anxiety , does not sleep well.    Stress Concern No    Weight Concern Yes     Comment: lost 20 lb in in 3-4 month     Special Diet No    Back Care No    Exercise Yes     Comment: cardio    Bike Helmet No    Seat Belt Yes    Self-Exams Yes       History/Other:     Patient Active Problem List   Diagnosis    Migraine Stable.       Elevated alkaline phosphatase measurement Resolved.       Mixed hyperlipidemia Stable.       B12 deficiency Stable.       Degeneration of lumbar or lumbosacral intervertebral disc Stable.       Aortic valve disorder Stable on echo 12-23.       Essential hypertension Higher.  Increase valsartan to 160 mg to 1 tablet in the morning and half a tablet or 80 mg in the evening.  Call in 1 to 2 weeks with blood pressures.Careful with diet and excercise at least 30 minutes 3-4 times a week. Check blood pressures at different times on different days. Can purchase own blood pressure monitor. If not, check at local pharmacy. Bake foods more and grill occasionally. Avoid fried foods. No salt. Use other seasonings.        Multiple thyroid nodules check thyroid ultrasound.  Check thyroglobulin tumor markers.      Vitamin D deficiency check blood.      Vaccine counseling holding on shingles vaccine.      GARY positive not lupus.      Hypokalemia check blood.      Nonischemic cardiomyopathy (HCC) clinically stable.      Pulmonary hypertension (HCC) Stable.       Acute systolic CHF (congestive heart failure), NYHA class 1 (HCC) euvolemic.  Follow-up with cardiology.      Dilated cardiomyopathy (HCC) stable.      Nonrheumatic mitral valve regurgitation stable.      Other insomnia discussed about options.  Had tried several medications in the past.  Will try Singulair for the cough 10 mg at night.  Discussed about side effects and use.  Stop if no helpful after 1 week.  Check sleep study.      Slow transit constipation add Benefiber 1 teaspoon a day.      Gout no exacerbations. Stable.             No motrin, ibuprofen, advil, alleve, naprosyn  with these medications.  Check blood.      Implantable defibrillator reprogramming       Uncontrolled type 2 diabetes mellitus with hyperglycemia (HCC) stable.Careful with diet and excercise at least 30 minutes 3-4 times a week. Check sugars at different times on different dates. Careful with low sugars. Carry something with you and check sugar if can. Can carry christel cracker, etc. Decrease carbohydrates. But also, careful with fruits and natural sugars.One serving a day and no more than 1 handful every day. Check feet  every AM and careful with sores and ulcers on feet bilaterally. Check eyes every year with dilated eye exam.  Check sugars.  2-hour postmeal should be less than 140s.  Pre-meal should be 's.  Both equally affected A1c.  Discussed importance of glycemic control to prevent complications of diabetes  -Discussed complications of diabetes include retinopathy, neuropathy, nephropathy and cardiovascular disease  -Discussed ABCs of DM  -Discussed importance of SBGM  -Discussed importance of low CHO diet, recommend 45gm per meal or 135gm per day  -Normal foot exam  -Follow-up with ophthalmology.       Papillary thyroid carcinoma (HCC)  Status post surgery.  Check blood and ultrasound.      Left bundle branch block (LBBB) stable.  Follow-up with cardiology.     Cough.  Try Singulair 10 mg at night discussed about side effects and use.  Call if no better after 1 week.    OB History   No obstetric history on file.        No LMP recorded. Patient is postmenopausal.    Hx of Pap: all Paps normal           Tobacco:  She has never smoked tobacco.    CAGE Alcohol Screen:   CAGE screening score of 0 on 5/17/2024, showing low risk of alcohol abuse.        Patient Care Team:  Ana Adair MD as PCP - General (Internal Medicine)  Nazanin Yin MD (NEUROLOGY)  Doc Jimenez MD as Consulting Physician (Cardiac Electrophysiology)  Ramírez Cisse MD as Consulting Physician (OTOLARYNGOLOGY)  Jamil Lestre MD as Consulting Physician (Interventional, Cardiology)  Chronic insomnia.  Since childhood.   Tries to go to bed at 10.  Occasionally sleeps for couple hours but once awakens cannot get back to sleep.  Not pain that disrupts sleep.  Not getting up to urinate.  Not sure if thoughts racing.   Got desperate and ended up taking 1 pint of Henessey to help sleep. No coffee. Pepsi at 3-4 Pm. Not change sleep.  Has tried multiple medicines to help with sleep.  Amitriptyline helps somewhat.  Nortriptyline does not help and stopped by herself.  Has tried melatonin over-the-counter has tried over-the-counter sleep medicines i.e. Tylenol PM etc.  Nothing has helped.  Had sleep study at 24 years old.  Told everything was normal.  Lives alone has not had any family members mention about snoring or apnea episodes.  Occasionally takes naps during the day.  Small for 1 hour but still does not feel refreshed.    Cough  This is a chronic problem. The current episode started 1 to 4 weeks ago. The problem has been unchanged. The problem occurs constantly. The cough is Productive of sputum. Associated symptoms include postnasal drip. Pertinent negatives include no chest pain, chills, ear congestion, ear pain, eye redness, fever, headaches, heartburn, hemoptysis, myalgias, nasal congestion, rash, rhinorrhea, sore throat, shortness of breath, sweats or wheezing. The symptoms are aggravated by lying down. She has tried nothing for the symptoms. There is no history of bronchiectasis or pneumonia.       Review of Systems   Constitutional:  Positive for fatigue. Negative for activity change, appetite change, chills, diaphoresis, fever and unexpected weight change.   HENT:  Positive for postnasal drip. Negative for congestion, dental problem, drooling, ear discharge, ear pain, facial swelling, hearing loss, mouth sores, nosebleeds, rhinorrhea, sinus pressure, sinus pain, sneezing, sore throat, tinnitus, trouble swallowing and voice change.    Eyes:  Negative for photophobia, pain, discharge, redness, itching and visual disturbance.    Respiratory:  Positive for cough. Negative for apnea, hemoptysis, choking, chest tightness, shortness of breath, wheezing and stridor.    Cardiovascular:  Negative for chest pain, palpitations and leg swelling.   Gastrointestinal:  Negative for abdominal distention, abdominal pain, anal bleeding, blood in stool, constipation, diarrhea, heartburn, nausea, rectal pain and vomiting.   Endocrine: Negative for cold intolerance, heat intolerance, polydipsia, polyphagia and polyuria.   Genitourinary:  Negative for decreased urine volume, difficulty urinating, dysuria, flank pain, frequency, hematuria, menstrual problem, pelvic pain, urgency, vaginal bleeding, vaginal discharge and vaginal pain.   Musculoskeletal:  Negative for myalgias.   Skin:  Negative for rash.   Neurological:  Negative for dizziness, tremors, seizures, syncope, facial asymmetry, speech difficulty, weakness, light-headedness, numbness and headaches.   Psychiatric/Behavioral:  Positive for agitation and sleep disturbance. Negative for behavioral problems, confusion, decreased concentration, dysphoric mood, hallucinations, self-injury and suicidal ideas. The patient is not nervous/anxious and is not hyperactive.    All other systems reviewed and are negative.          Functional Ability/Status:   Rozina Haynes has some abnormal functions as listed below:  She has Driving difficulties based on screening of functional status.         Fall Risk Assessment:   She has been screened for Falls and is High Risk. Fall Prevention information provided to patient in After Visit Summary.    Do you feel unsteady when standing or walking?: Yes  Do you worry about falling?: Yes  Have you fallen in the past year?: No  Were you injured?: No       Medicare Hearing Assessment:   Hearing Screening    Time taken: 5/17/2024 11:32 AM  Entry User: Caroline Pereira CMA  Screening Method: Finger Rub  Finger Rub Result: Pass         Depression Screening (PHQ-2/PHQ-9): PHQ-2 SCORE: 2  ,  done 5/17/2024   Little interest or pleasure in doing things: 2           Cognitive Assessment:   Abnormal  What day of the week is this?: Correct  What month is it?: Correct  What year is it?: Correct  Recall \"Ball\": Correct  Recall \"Flag\": Incorrect  Recall \"Tree\": Incorrect      Supplementary Documentation:     In the past six months, have you lost more than 10 pounds without trying?: 2 - No  Has your appetite been poor?: No  Type of Diet: Balanced  How does the patient maintain a good energy level?: Daily Walks  How would you describe your daily physical activity?: None  How would you describe your current health state?: Fair  How do you maintain positive mental well-being?: Puzzles  On a scale of 0 to 10, with 0 being no pain and 10 being severe pain, what is your pain level?: 5 - (Moderate)  In the past six months, have you experienced urine leakage?: 0-No  At any time do you feel concerned for the safety/well-being of yourself and/or your children, in your home or elsewhere?: No  Have you had any immunizations at another office such as Influenza, Hepatitis B, Tetanus, or Pneumococcal?: No          PHYSICAL EXAM:   /70 (BP Location: Right arm, Patient Position: Sitting, Cuff Size: large)   Pulse 109   Temp 97.2 °F (36.2 °C) (Temporal)   Ht 5' 4\" (1.626 m)   Wt 176 lb (79.8 kg)   SpO2 97%   BMI 30.21 kg/m²   BP Readings from Last 3 Encounters:   05/17/24 144/70   02/06/24 155/77   12/23/23 146/44     Wt Readings from Last 3 Encounters:   05/17/24 176 lb (79.8 kg)   02/06/24 177 lb 9.6 oz (80.6 kg)   12/22/23 169 lb 12.1 oz (77 kg)      Body mass index is 30.21 kg/m².   Physical Exam  Vitals and nursing note reviewed.   Constitutional:       General: She is not in acute distress.     Appearance: Normal appearance. She is well-developed and well-groomed. She is not ill-appearing, toxic-appearing or diaphoretic.      Interventions: She is not intubated.  HENT:      Head: Normocephalic and atraumatic.       Right Ear: Tympanic membrane, ear canal and external ear normal. Decreased hearing noted. No laceration, drainage, swelling or tenderness. No middle ear effusion. There is no impacted cerumen. No foreign body. No mastoid tenderness. No PE tube. No hemotympanum. Tympanic membrane is not injected, scarred, perforated, erythematous, retracted or bulging. Tympanic membrane has normal mobility.      Left Ear: Tympanic membrane, ear canal and external ear normal. Decreased hearing noted. No laceration, drainage, swelling or tenderness.  No middle ear effusion. There is no impacted cerumen. No foreign body. No mastoid tenderness. No PE tube. No hemotympanum. Tympanic membrane is not injected, scarred, perforated, erythematous, retracted or bulging. Tympanic membrane has normal mobility.      Nose:      Right Sinus: No maxillary sinus tenderness or frontal sinus tenderness.      Left Sinus: No maxillary sinus tenderness or frontal sinus tenderness.      Mouth/Throat:      Lips: Pink. No lesions.      Mouth: Mucous membranes are moist. No injury, lacerations, oral lesions or angioedema.      Dentition: No dental tenderness, gingival swelling, dental abscesses or gum lesions.      Tongue: No lesions. Tongue does not deviate from midline.      Palate: No mass and lesions.      Pharynx: Oropharynx is clear. Uvula midline. No pharyngeal swelling, oropharyngeal exudate, posterior oropharyngeal erythema or uvula swelling.      Tonsils: No tonsillar exudate or tonsillar abscesses.   Eyes:      General: Lids are normal. No scleral icterus.        Right eye: No foreign body, discharge or hordeolum.         Left eye: No foreign body, discharge or hordeolum.      Extraocular Movements: Extraocular movements intact.      Right eye: Normal extraocular motion and no nystagmus.      Left eye: Normal extraocular motion and no nystagmus.      Conjunctiva/sclera: Conjunctivae normal.      Right eye: Right conjunctiva is not injected. No  chemosis, exudate or hemorrhage.     Left eye: Left conjunctiva is not injected. No chemosis, exudate or hemorrhage.     Pupils: Pupils are equal, round, and reactive to light.   Neck:      Thyroid: No thyroid mass, thyromegaly or thyroid tenderness.      Vascular: Normal carotid pulses. No carotid bruit, hepatojugular reflux or JVD.      Trachea: Trachea and phonation normal. No tracheal tenderness, tracheostomy, abnormal tracheal secretions or tracheal deviation.   Cardiovascular:      Rate and Rhythm: Normal rate and regular rhythm.      Pulses: Normal pulses.           Carotid pulses are 2+ on the right side and 2+ on the left side.       Radial pulses are 2+ on the right side and 2+ on the left side.        Dorsalis pedis pulses are 2+ on the right side and 2+ on the left side.        Posterior tibial pulses are 2+ on the right side and 2+ on the left side.      Heart sounds: Normal heart sounds, S1 normal and S2 normal.   Pulmonary:      Effort: Pulmonary effort is normal. No tachypnea, bradypnea, accessory muscle usage, prolonged expiration, respiratory distress or retractions. She is not intubated.      Breath sounds: Normal breath sounds and air entry. No stridor, decreased air movement or transmitted upper airway sounds. No decreased breath sounds, wheezing, rhonchi or rales.   Chest:      Chest wall: No tenderness.   Breasts:     Breasts are symmetrical.      Right: No swelling, bleeding, inverted nipple, mass, nipple discharge, skin change or tenderness.      Left: No swelling, bleeding, inverted nipple, mass, nipple discharge, skin change or tenderness.   Abdominal:      General: Bowel sounds are normal. There is no distension.      Palpations: Abdomen is soft. Abdomen is not rigid. There is no fluid wave, hepatomegaly, splenomegaly or mass.      Tenderness: There is no abdominal tenderness. There is no right CVA tenderness, left CVA tenderness, guarding or rebound.   Genitourinary:     Exam position:  Supine.   Musculoskeletal:      Cervical back: Neck supple. No tenderness.      Right lower leg: No edema.      Left lower leg: No edema.   Lymphadenopathy:      Head:      Right side of head: No submental, submandibular, preauricular, posterior auricular or occipital adenopathy.      Left side of head: No submental, submandibular, preauricular, posterior auricular or occipital adenopathy.      Cervical: No cervical adenopathy.      Right cervical: No superficial, deep or posterior cervical adenopathy.     Left cervical: No superficial, deep or posterior cervical adenopathy.      Upper Body:      Right upper body: No supraclavicular, axillary or pectoral adenopathy.      Left upper body: No supraclavicular, axillary or pectoral adenopathy.   Skin:     General: Skin is warm and dry.      Coloration: Skin is not pale.      Findings: No erythema or rash.   Neurological:      Mental Status: She is alert and oriented to person, place, and time.   Psychiatric:         Mood and Affect: Mood normal.         Speech: Speech normal.         Behavior: Behavior normal. Behavior is cooperative.     Bilateral barefoot skin diabetic exam is normal, visualized feet and the appearance is normal.  Bilateral monofilament sensation of both feet is normal.  Pulsation pedal pulse exam of both lower legs/feet is normal as well.       ASSESSMENT/PLAN:     Encounter Diagnoses   Name Primary?    Acute systolic CHF (congestive heart failure), NYHA class 1 (HCC) Yes    GARY positive     Aortic valve disorder     B12 deficiency     Stage 3a chronic kidney disease (HCC)     Degeneration of lumbar or lumbosacral intervertebral disc     Dilated cardiomyopathy (HCC)     Elevated alkaline phosphatase measurement     Essential hypertension     Idiopathic gout, unspecified chronicity, unspecified site     Papillary thyroid carcinoma (HCC)     Other insomnia     Nonrheumatic mitral valve regurgitation     Nonischemic cardiomyopathy (HCC)     Multiple  thyroid nodules     Implantable defibrillator reprogramming/check     Left bundle branch block (LBBB)     Mixed hyperlipidemia     Pulmonary hypertension (HCC)     Slow transit constipation     Vitamin D deficiency     Vaccine counseling     Uncontrolled type 2 diabetes mellitus with hyperglycemia (HCC)     Encounter for annual health examination        No orders of the defined types were placed in this encounter.      Meds This Visit:  Requested Prescriptions      No prescriptions requested or ordered in this encounter       Imaging & Referrals:  [facility] ALT REFERRAL DIAGOSTIC SLEEP STUDY ADULT      Return to clinic in 4 months extended.  Follow-up on blood pressure.    1. Acute systolic CHF (congestive heart failure), NYHA class 1 (HCC) (Primary)  Overview:  Patient was in the hospital with new onset heart failure. And a nonischemic cardiomyopathy ejection fraction 25 to 30% at least moderate mitral regurgitation. She also had moderate to severe pulmonary hypertension. She is no longer on diuretics. She is wearing a LifeVest.  Plan: ICD in place.  HFrEF  biv icd 6/2020  HTN  HL  Mild pHTN  CARDIAC TESTING:(tracings and images viewed by myself)   EKG bpm LBBB, qrs 171 msec  ECHO 2/4/20 EF 20-25%, no WMA, Grade 3 DD. Mild AI, severe MR, LA mildl dilated, PAP 40 mmHg  RHC 2/2020 Mean RA 13, PA 62/35, wedge 24, LVEDP 33.  CATH 2/2020 Coronaries normal  ECHO 5/2020 = EF 25%, mr mod, lae mild  biv icd f/u 1/2021 est nubia 2028, bivp99, no ams, no vt  ASSESSMENT AND PLAN:   1. HFrEF  ACC/AHA Stage C; NYHA III  GDMT/ Heart Failure Medications: metoprolol succinate, entresto, asa   Devices: LIFEVEST-has not been wearing as she has not been able to get battery to work  Nuclear PET 1.Stress EKG is non-diagnostic due to ventricular paced rhythm2.No chest pain3.No arrhythmias1.This is an abnormal perfusion study. 2.Medium reversible perfusion abnormality of mild intensity in the apical anterior and anterolateral segment. 3.The  left ventricular cavity is noted to be mildly enlarged on the stress studies. The stress left ventricular ejection fraction was calculated to be 33% and left ventricular global function is moderately reduced. The rest left ventricular cavity is noted to be mildly enlarged. The rest left ventricular ejection fraction was calculated to be 32% and rest left ventricular global function is moderately reduced. 4.This scan is suggestive of low to moderate risk for future cardiovascular events. 5.The study quality is average. 9/5/2023 9:30:00 AM Gildardo Barros MD   Trans Thoracic Echocardiogram 1.The study quality is average. 2.The left ventricle is normal in size. Global left ventricular systolic function is normal. . The left ventricular ejection fraction is 56%. Left ventricular diastolic function is indeterminate.3.The right ventricle is normal in size. Right ventricular systolic function is normal.4.Mild calcification of the aortic valve is noted with adequate cuspal excursion. Mild aortic regurgitation.5.Mild mitral regurgitation.6.Mild tricuspid regurgitation. 12/6/2023 2:30:00 PM Doc Jimenez MD       2. GARY positive  3. Aortic valve disorder  4. B12 deficiency  5. Stage 3a chronic kidney disease (HCC)  6. Degeneration of lumbar or lumbosacral intervertebral disc  7. Dilated cardiomyopathy (HCC)  Overview:  Patient was in the hospital with new onset heart failure. And a nonischemic cardiomyopathy ejection fraction 25 to 30% at least moderate mitral regurgitation. She also had moderate to severe pulmonary hypertension. She has been seeing Corinne in the heart failure center over the last couple months. She is no longer on diuretics. She is wearing a LifeVest.  HFrEF  biv icd 6/2020  HTN  HL  Mild pHTN  CARDIAC TESTING:(tracings and images viewed by myself)   EKG bpm LBBB, qrs 171 msec  Nuclear PET 1.Stress EKG is non-diagnostic due to ventricular paced rhythm2.No chest pain3.No arrhythmias1.This is an abnormal perfusion  study. 2.Medium reversible perfusion abnormality of mild intensity in the apical anterior and anterolateral segment. 3.The left ventricular cavity is noted to be mildly enlarged on the stress studies. The stress left ventricular ejection fraction was calculated to be 33% and left ventricular global function is moderately reduced. The rest left ventricular cavity is noted to be mildly enlarged. The rest left ventricular ejection fraction was calculated to be 32% and rest left ventricular global function is moderately reduced. 4.This scan is suggestive of low to moderate risk for future cardiovascular events. 5.The study quality is average. 9/5/2023 9:30:00 AM Gildardo Barros MD   Trans Thoracic Echocardiogram 1.The study quality is average. 2.The left ventricle is normal in size. Global left ventricular systolic function is normal. . The left ventricular ejection fraction is 56%. Left ventricular diastolic function is indeterminate.3.The right ventricle is normal in size. Right ventricular systolic function is normal.4.Mild calcification of the aortic valve is noted with adequate cuspal excursion. Mild aortic regurgitation.5.Mild mitral regurgitation.6.Mild tricuspid regurgitation. 12/6/2023 2:30:00 PM Doc Jimenez MD     ECHO 2/4/20 EF 20-25%, no WMA, Grade 3 DD. Mild AI, severe MR, LA mildl dilated, PAP 40 mmHg  RHC 2/2020 Mean RA 13, PA 62/35, wedge 24, LVEDP 33.  CATH 2/2020 Coronaries normal  ECHO 5/2020 = EF 25%, mr mod, lae mild  biv icd f/u 1/2021 est nubia 2028, bivp99, no ams, no vt  ASSESSMENT AND PLAN:   1. HFrEF  ACC/AHA Stage C; NYHA III  GDMT/ Heart Failure Medications: metoprolol succinate, entresto, asa   Devices: LIFEVEST-has not been wearing as she has not been able to get battery to work      8. Elevated alkaline phosphatase measurement  9. Essential hypertension  10. Idiopathic gout, unspecified chronicity, unspecified site  11. Papillary thyroid carcinoma (HCC)  Overview:  Microcarcinoma, follicular  variant.  Status post total parathyroidectomy in 2015 Dr. Santos ENT.  All surgical margins negative for malignancy.  Lymph nodes negative.  Small fragment of parathyroid tissue on the right side.  Multinodular goiter.  Hashimoto's thyroiditis.  12. Other insomnia  -     Diagnostic Sleep Study  13. Nonrheumatic mitral valve regurgitation  14. Nonischemic cardiomyopathy (HCC)  Overview:  2-5-20 cath.:   SUMMARY:    1.       Moderate to severe pulmonary hypertension.    2.       Severe elevation of left ventricular filling pressures.    3.       Severe global left ventricular hypokinesis.  LVEF 25%.   4.       Normal coronary arteries.  Echo: moderate MR.   Hospitalized 2/4-6 for shortness of breath, wheezing and tachypnea. O2 sat 80%. Echo showed EF 20-25%, no WMA, Grade 3 DD. Diuresed with IV lasix. RHC 2/5/20 Mean RA 13, PA 62/35, wedge 24, LVEDP 33. Left ventricle, hypertrophied, moderate to severe global hypokinesis, estimated ejection fraction 25 to 30%. At least moderate MR. Coronaries normal. Treated with IV Lasix. Transitioned to Entresto and discharged on Lifevest.   Readmitted 2/10-13 for weakness and frequent falls  EKG bpm LBBB, qrs 171 msec  ECHO 2/4/20 EF 20-25%, no WMA, Grade 3 DD. Mild AI, severe MR, LA mildl dilated, PAP 40 mmHg  RHC 2/2020 Mean RA 13, PA 62/35, wedge 24, LVEDP 33.  She is wearing the lifevest. Despite optimal medicines for >90 days her EF is still 25% and NYHA III with LBBB, qrs 171 msec.  6-15-20: Biv ICD implant     9-15-21 ECHO: 1. Left ventricle: The cavity size was normal. Systolic function      was mildly reduced. The estimated ejection fraction was 45%, by      visual assessment. Doppler parameters are consistent with      abnormal left ventricular relaxation (grade 1 diastolic      dysfunction).   2. Regional wall motion abnormality: Hypokinesis of the basal      anteroseptal, basal inferoseptal, and basal anterolateral      myocardium.   3. Aortic valve: Mild regurgitation.    4. Mitral valve: No regurgitation.   Compared to the previous study these findings represent   improvement. Ejection fraction has improved from 25% on 2020 study.   Transthoracic echo done September 5, 2023 showed normal LV wall thickness.  Regional wall motion shows hypokinesis of basal anterior septal segment and mid anterior septal segment.  Global LVEF is mildly decreased at 40%.  LV diastolic function is indeterminate.  Mild MR mild TR mild MT.  Calcification of the aortic valve is mild with adequate cuspal exertion.  Mild AR.  RV is normal in size and RV function is normal.  Nuclear PET 1.Stress EKG is non-diagnostic due to ventricular paced rhythm2.No chest pain3.No arrhythmias1.This is an abnormal perfusion study. 2.Medium reversible perfusion abnormality of mild intensity in the apical anterior and anterolateral segment. 3.The left ventricular cavity is noted to be mildly enlarged on the stress studies. The stress left ventricular ejection fraction was calculated to be 33% and left ventricular global function is moderately reduced. The rest left ventricular cavity is noted to be mildly enlarged. The rest left ventricular ejection fraction was calculated to be 32% and rest left ventricular global function is moderately reduced. 4.This scan is suggestive of low to moderate risk for future cardiovascular events. 5.The study quality is average. 9/5/2023 9:30:00 AM Gildardo Barros MD   Trans Thoracic Echocardiogram 1.The study quality is average. 2.The left ventricle is normal in size. Global left ventricular systolic function is normal. . The left ventricular ejection fraction is 56%. Left ventricular diastolic function is indeterminate.3.The right ventricle is normal in size. Right ventricular systolic function is normal.4.Mild calcification of the aortic valve is noted with adequate cuspal excursion. Mild aortic regurgitation.5.Mild mitral regurgitation.6.Mild tricuspid regurgitation. 12/6/2023 2:30:00 PM  Doc Jimenez MD     15. Multiple thyroid nodules  Overview:  Overview:   Dr Chavarria  16. Implantable defibrillator reprogramming/check  Overview:  HFrEF  biv icd 6/2020  HTN  HL  Mild pHTN  CARDIAC TESTING:(tracings and images viewed by myself)   EKG bpm LBBB, qrs 171 msec  ECHO 2/4/20 EF 20-25%, no WMA, Grade 3 DD. Mild AI, severe MR, LA mildl dilated, PAP 40 mmHg  RHC 2/2020 Mean RA 13, PA 62/35, wedge 24, LVEDP 33.  CATH 2/2020 Coronaries normal  ECHO 5/2020 = EF 25%, mr mod, lae mild  biv icd f/u 1/2021 est nubia 2028, bivp99, no ams, no vt  ASSESSMENT AND PLAN:   1. HFrEF  ACC/AHA Stage C; NYHA III  GDMT/ Heart Failure Medications: metoprolol succinate, entresto, asa   Devices: LIFEVEST-has not been wearing as she has not been able to get battery to work    17. Left bundle branch block (LBBB)  18. Mixed hyperlipidemia  19. Pulmonary hypertension (HCC)  20. Slow transit constipation  21. Vitamin D deficiency  22. Vaccine counseling  23. Uncontrolled type 2 diabetes mellitus with hyperglycemia (HCC)  24. Encounter for annual health examination      The patient indicates understanding of these issues and agrees to the plan.  Further testing ordered.  Imaging studies ordered.  Lab work ordered.  Reinforced healthy diet, lifestyle, and exercise.      No follow-ups on file.    Advanced Directives:   She does NOT have a Living Will. [Do you have a living will?: No]  She does NOT have a Power of  for Health Care. [Do you have a healthcare power of ?: No]  Discussed Advance Care Planning with patient (and family/surrogate if present). Standard forms made available to patient in After Visit Summary.  16+ minutes was spent with all Advanced Care Planning elements today (up to 30 minutes for CPT 21306)    MD Rozina Seymour's SCREENING SCHEDULE   Tests on this list are recommended by your physician but may not be covered, or covered at this frequency, by your insurer.   Please check with your  insurance carrier before scheduling to verify coverage.   PREVENTATIVE SERVICES FREQUENCY &  COVERAGE DETAILS LAST COMPLETION DATE   Diabetes Screening    Fasting Blood Sugar /  Glucose    One screening every 12 months if never tested or if previously tested but not diagnosed with pre-diabetes   One screening every 6 months if diagnosed with pre-diabetes Lab Results   Component Value Date    GLU 81 02/06/2024        Cardiovascular Disease Screening    Lipid Panel  Cholesterol  Lipoprotein (HDL)  Triglycerides Covered every 5 years for all Medicare beneficiaries without apparent signs or symptoms of cardiovascular disease Lab Results   Component Value Date    CHOLEST 149 02/06/2024    HDL 52 02/06/2024    LDL 70 02/06/2024    TRIG 162 (H) 02/06/2024         Electrocardiogram (EKG)   Covered if needed at Welcome to Medicare, and non-screening if indicated for medical reasons 12/24/2023      Ultrasound Screening for Abdominal Aortic Aneurysm (AAA) Covered once in a lifetime for one of the following risk factors    Men who are 65-75 years old and have ever smoked    Anyone with a family history -     Colorectal Cancer Screening  Covered for ages 50-85; only need ONE of the following:    Colonoscopy   Covered every 10 years    Covered every 2 years if patient is at high risk or previous colonoscopy was abnormal 02/19/2016    No recommendations at this time    Flexible Sigmoidoscopy   Covered every 4 years -    Fecal Occult Blood Test Covered annually -   Bone Density Screening    Bone density screening    Covered every 2 years after age 65 if diagnosed with risk of osteoporosis or estrogen deficiency.    Covered yearly for long-term glucocorticoid medication use (Steroids) Last Dexa Scan:    XR DEXA BONE DENSITOMETRY (CPT=77080) 08/15/2016      No recommendations at this time   Pap and Pelvic    Pap   Covered every 2 years for women at normal risk; Annually if at high risk -  No recommendations at this time    Chlamydia  Annually if high risk -  No recommendations at this time   Screening Mammogram    Mammogram     Recommend annually for all female patients aged 40 and older    One baseline mammogram covered for patients aged 35-39 -    No recommendations at this time    Immunizations    Influenza Covered once per flu season  Please get every year -  No recommendations at this time    Pneumococcal Each vaccine ([id number] & [id number]) covered once after 65 Prevnar 13: 01/19/2016    Jxdrleprr66: 03/12/2018     No recommendations at this time    Hepatitis B One screening covered for patients with certain risk factors   -  No recommendations at this time    Tetanus Toxoid Not covered by Medicare Part B unless medically necessary (cut with metal); may be covered with your pharmacy prescription benefits -    Tetanus, Diptheria and Pertusis TD and TDaP Not covered by Medicare Part B -  No recommendations at this time    Zoster Not covered by Medicare Part B; may be covered with your pharmacy  prescription benefits -  No recommendations at this time     Diabetes      Hemoglobin A1C Annually; if last result is elevated, may be asked to retest more frequently.    Medicare covers every 3 months Lab Results   Component Value Date     02/06/2024    A1C 6.0 (H) 02/06/2024       No recommendations at this time    Creat/alb ratio Annually Lab Results   Component Value Date    MICROALBCREA 2.6 02/06/2024       LDL Annually Lab Results   Component Value Date    LDL 70 02/06/2024       Dilated Eye Exam Annually Last Diabetic Eye Exam:  No data recorded  No data recorded       Annual Monitoring of Persistent Medications (ACE/ARB, digoxin diuretics, anticonvulsants)    Potassium Annually Lab Results   Component Value Date    K 4.2 02/06/2024         Creatinine   Annually Lab Results   Component Value Date    CREATSERUM 0.91 02/06/2024         BUN Annually Lab Results   Component Value Date    BUN 16 02/06/2024       Drug Serum Conc Annually No  results found for: \"DIGOXIN\", \"DIG\", \"VALP\"                     Understanding Advance Care Planning  Advance care planning is the process of deciding one’s own future medical care. It helps assure that if you can’t speak for yourself, your wishes can still be carried out. The plan is a series of legal documents that note a person’s wishes. The documents vary by state. Advance care planning should be discussed at a regular office visit with your primary care provider before an acute illness. Advance care planning is encouraged when a person has a serious illness that is expected to get worse. It may also be done before major surgery. And it can help you and your family be prepared in case of a major illness or injury. Advance care planning helps with making decisions at these times.     Who will speak on your behalf?  A healthcare proxy is a person who acts as the voice of a patient when the patient can’t speak for himself or herself. The name of this role varies by state. It may be called a Durable Medical Power of  or Durable Power of  for Healthcare. It may be called an agent, surrogate, or advocate. Or it may be called a representative or decision maker. It's an official duty that is identified by a legal document. The document also varies by state.   Why is advance care planning important?   If a person communicates his or her healthcare wishes:   He or she will be given medical care that matches his or her values and goals.  Family members will not be forced to make decisions in a crisis with no guidance.  Creating a plan  Making an advance care plan is often done in 3 steps:   Thinking about one’s wishes. To create an advance care plan, think about what kind of medical treatment you would want if you lose the ability to communicate. Are there any situations in which you would refuse or stop treatment? Are there therapies you would want or not want? And whom do you want to make decisions for  you? There are many places to learn more about how to plan for your care. Ask your healthcare provider or  for resources.  Picking a healthcare proxy. This means choosing a trusted person to speak for you only when you can’t speak for yourself. When you can't make medical decisions, your proxy makes sure the instructions in your advance care plan are followed. A proxy doesn't make decisions based on his or her own opinions. They must put aside those opinions and values if needed, and carry out your wishes.  Filling out the legal documents. There are several kinds of legal documents for advance care planning. Each one tells healthcare providers your wishes. The documents may vary by state. They must be signed and may need to be witnessed or notarized. You can cancel or change them whenever you wish. Depending on your state, the documents may include a Healthcare Proxy form, Living Will, Durable Medical Power of , and Advance Directive.  The family’s role  The best help a family can give is to support their loved one’s wishes. Open and honest communication is vital. Family should express any concerns they have about the patient’s choices while the patient can still make decisions in the event that his or her illness prevents communicating those wishes at a later time.    [employer] last reviewed this educational content on 12/1/2019    © 0437-1968 [employer]. All rights reserved. This information is not intended as a substitute for professional medical care. Always follow your healthcare professional's instructions.          Advance Medical Directive  An advance medical directive is a form that lets you plan ahead for the care you’d want if you could no longer express your wishes. This statement outlines the medical treatment you’d want or names the person you’d wish to make healthcare decisions for you. Be aware that laws vary from state to state, and it may be worthwhile to talk  with an .     Writing down your wishes  An advance directive is important whether you’re young or old. Injury or illness can strike at any age.  Decide what is important to you and the kind of treatment you’d want, or not want to have.  Some states allow only one kind of advance directive. Some let you do both a durable power of  for healthcare and a living will. Some states put both kinds on the same form.    A durable power of  (POA) for healthcare   This form lets you name someone else that you have chosen and trust to speak and make decisions on your behalf.  This person can decide on treatment for you only when you can’t speak for yourself.  You don't need to be at the end of your life. They could speak for you if you were in a coma but were likely to recover.    A living will  This form lets you list the care you want at the end of your life.  A living will applies only if you won’t live without medical treatment. It would apply if you had advanced cancer, a massive stroke, or other serious illness from which you will not recover.  It takes effect only when you can no longer express your wishes yourself.    [employer] last reviewed this educational content on 2/1/2021 © 2000-2021 [employer]. All rights reserved. This information is not intended as a substitute for professional medical care. Always follow your healthcare professional's instructions.          Choosing an Agent  A durable power of  for healthcare is only as good as the person you name to be your agent. Your agent is the person you have chosen to speak and make decisions on your behalf. If this person knows your treatment wishes and is willing to carry them out, you’ll probably be well represented. Be sure to tell your agent what’s important to you.     Who to choose  Here are suggestions for choosing an agent:  You can name a family member, close friend, , , or rabbi.  You should name  one person as your agent. Then name one or two alternates. You need a backup person in case your first choice can’t be reached when needed.  Talk with each person you're thinking of naming as your agent or alternate. Do this before you decide who should carry out your wishes.  Your agent should be ...  A competent adult, age 18 or older  Someone you trust and can talk to about the care you want and what's important to you  Someone who supports your treatment choices    In many states, your agent can’t be ...   Your healthcare provider  An employee of your provider or of a hospital, nursing home, or hospice program where you receive care  Some states have other restrictions on who can be named as an agent for an advance directive.   Be prepared  Tip: It's a good idea to write down your wishes and give a copy to your agent and all others who are involved with your healthcare.   [employer] last reviewed this educational content on 8/1/2021    © 0778-4943 [employer]. All rights reserved. This information is not intended as a substitute for professional medical care. Always follow your healthcare professional's instructions.          Understanding DNR Orders  Do not resuscitate (DNR) orders tell hospital staff not to do potentially life-restoring measures, such as CPR, if you or your loved one's heart and lungs stop working. It allows a natural death, and prevents healthcare staff from artificially reviving a person and placing them on life support. In many states, a DNR order also applies to staff outside the hospital such as in nursing homes and emergency medical services. A DNR order must be written by a healthcare provider. Or in some cases, certain other healthcare workers write it. This can only be done with the person’s or family’s consent. If a person has not written an advance directive, their family will decide on a DNR with the help of the healthcare team.   The person can cancel a DNR order at  any time. The healthcare team can answer questions about the DNR form. Have copies of a DNR form are readily available so that your wishes can be faithfully followed.   Writing a DNR order  When might a DNR order be written? When the person’s health condition is such that, in the case of cardiac arrest, CPR and other resuscitation methods are not desired. This could be because the chance of successful resuscitation is very low. Or it could be because the care plan now focuses on comfort measures instead of life-sustaining measures. Coma and terminal illness are instances when a DNR order might be used.   Irreversible coma  In a coma, a person does not respond to sight, sound, or touch. The heart and lungs could be working, but brain function is damaged due to trauma or disease.   Terminal illness  In the last stages of heart disease, AIDS, cancer, and other illnesses, some people don’t want to prolong their suffering. If recovery isn’t likely and quality of life is poor or getting worse, a person or their family may agree to a DNR order.   DNR orders and hospice care  A hospice program can offer care during the final weeks of life. Hospice programs provide dignity, pain control and comfort care in the home or at special facilities. Hospice does not provide aggressive treatment. In fact, a DNR order will likely be discussed before a person is admitted to hospice. A  or  may be able to help you arrange for hospice support.   Documenting end-of-life wishes  In addition to DNR orders, a person with a serious, life-limiting illness may wish to document their treatment wishes. This is called a POST form or by different names, depending on the state. It's meant to provide a portable medical order to help guide healthcare providers on the specific medical treatments a person wants during a medical emergency. It's meant to complement a DNR order, not replace it. Your healthcare provider can tell you  more and help you complete the forms. The form may be called one of these:   MOLST (medical orders for life-sustaining treatment)  POLST (physician orders for life-sustaining treatment)  MOST (medical orders for scope of treatment)  POST (physician orders for scope of treatment)  TPOPP (transportable physician orders for patient preferences)  [employer] last reviewed this educational content on 7/1/2021 © 2000-2021 [employer]. All rights reserved. This information is not intended as a substitute for professional medical care. Always follow your healthcare professional's instructions.          An Agent’s Role for Durable Power of  for Health Care   It’s impossible to know which medical treatment choices you might face in the future. What if you aren't able to make these decisions for yourself? A durable power of  for healthcare lets you name an agent to speak and carry out your wishes on your behalf. This happens only if you can’t express your wishes yourself.     An agent’s duty  Your agent respects your wishes in the following ways:    Your agent’s duty is to see that your wishes are followed.  If your wishes aren’t known, your agent should try to decide what you want.  Your agent’s choices come before anyone else’s wishes for you.  A durable power of  for healthcare doesn't not give your agent control over your money . Your agent also can’t be made to pay your bills.  Find out what your agent can do  Restrictions on what an agent can and can’t do vary by state. Check your state laws. In most states your agent can:   Choose or refuse life-sustaining and other medical treatment on your behalf  Consent to treatment, and then stop treatment if your condition doesn’t improve  Access and release your medical records  Request an autopsy and donate your organs, unless you’ve stated otherwise in your advance directive  Find out whether your state allows your agent to do the  following:   Refuse or withdraw life-enhancing care  Refuse or stop tube feeding or other life-sustaining care--even if you haven’t stated on your advance directive that you don’t want these treatments  Order sterilization or   Ludy last reviewed this educational content on 2021 [employer]. All rights reserved. This information is not intended as a substitute for professional medical care. Always follow your healthcare professional's instructions.          Being a Healthcare Proxy  A healthcare proxy is someone who represents a person who can’t speak for themself. The name of this role varies by state. It may be called a Durable Medical Power of . It may be called a Durable Power of  for Healthcare. It may be called an agent, surrogate, or advocate. Or it may be called a representative or decision maker. It's an official duty that is noted by a legal document. The document also varies by state. The person must name you as his or her proxy on the document.      A healthcare proxy speaks for another person when he or she is not able to do so. The proxy helps make sure the person’s healthcare wishes are known and followed.     What it means to be a healthcare proxy   Your role as healthcare proxy starts when the person can’t make medical decisions. This assessment can only be made by a licensed doctor. You then make the healthcare decisions as needed. You do this by carrying out the person’s wishes. These wishes are noted in his or her advance care planning documents. These declare what kind of treatment the person wishes to have or not have. You may need to put aside your own values and opinions to carry out the person’s wishes. This may include refusing or stopping life-sustaining treatments.   Documenting end-of-life wishes   As a healthcare proxy, encourage the person to discuss his or her wishes, while they are able. They can do this with their  healthcare provider and then document the wishes as a medical order. The provider can help the person complete the form. The forms are known by different names depending on the state. The form may be called one of these:     MOLST (medical orders for life-sustaining treatment)  POLST (physician orders for life-sustaining treatment)  MOST (medical orders for scope of treatment)  POST (physician orders for scope of treatment)  TPOPP (transportable physician orders for patient preferences)    The form documents the person’s wishes at the end of life. It's not tied to a certain healthcare provider or facility. It's different than a living will. The form is an order written according to state regulations by a healthcare provider. To complete one, the person must express his or her wishes to an advanced healthcare provider. If the person can’t make his or her own decisions, then this is done by the person’s healthcare proxy.   Carrying out your role  Your duties depend on what the person’s advance care planning documents say. Your duties may also depend on state law. In general:   Before accepting a role as a proxy, talk with the person. Be sure you know his or her wishes. Ask questions. This will help you be his or her voice if and when it is needed.  Be sure that the person’s healthcare team knows that you are his or her proxy. Carry a copy of the document and proof of your identity.  Make sure the healthcare team has a copy of the advance care planning documents.  Talk to the healthcare team. Ask questions as often as you need. Stay informed about the person’s condition.  Ask for any help you need to understand the medical situation. Ask about the person’s condition and prognosis. Ask about risks and benefits of tests and treatments. Find out all the facts and options.  Speak on the person’s behalf with the healthcare team when needed.  Talk with family members and keep them informed.  Know your rights. You have the  right to ask for information. You can ask for consultations and second opinions. You have the right to request or refuse treatment for the person. You may be able to review his or her medical chart. You can authorize the person’s transfer to another facility. You can also request a new healthcare provider for him or her. If you are not sure what your rights are at any time, ask a .  When it’s time to make decisions  If the person’s wishes are clear in the advance care plan documents, ask for them to be carried out as noted. If they are not clear, talk with the healthcare team. Listen to the team’s recommendations. Talk with a  or counselor. It may be hard for you to make a decision at times. You may feel sad or upset about a decision. Being a healthcare proxy is not an easy role. But it's an important one. Remember that the person trusts you to carry out his or her wishes.   If you need help  Ask the healthcare team if you have trouble with a decision. The healthcare team will help you.  Encourage the person you are helping to have a conversation with their provider about their end-of-life wishes. The provider can help them fill out the form.  You may need help in resolving family conflicts. Ask the hospital or clinic , ethics consultant, or a  for help.  If you are having trouble talking with the healthcare team while the person is in the hospital, reach out to the patient relations department. Or ask to speak to the hospital ombudsman or ethics committee.    [employer] last reviewed this educational content on 9/1/2019 © 2000-2021 [employer]. All rights reserved. This information is not intended as a substitute for professional medical care. Always follow your healthcare professional's instructions.          Life Support  If you understand how specific treatments may affect your quality of life, you can decide which ones you’d choose or  refuse. You may want to talk to your healthcare provider about the possible benefits and risks of treatments. The chance of good results from these therapies varies based on each individual clinical situation and can be very hard to predict. Medical treatment, if your life is in danger, falls into 3 main categories.     Life supporting  This care keeps your heart and lungs going when they can no longer work on their own.  CPR restarts your heart and lungs if they stop working.  A respirator (or ventilator) keeps you breathing. Air is pumped into your lungs through a tube that’s put into your windpipe.    Life sustaining  This care keeps you alive longer when you have an illness that can’t be cured.  Tube feeding or TPN (total parenteral nutrition) provides food and fluids through a tube or IV (intravenous). It is given if you can’t chew or swallow on your own.  Dialysis is a kidney machine that cleans your blood when your kidneys can no longer work on their own.    Life enhancing  This care controls pain and discomfort, such as nausea or trouble breathing. This type of care is not designed to prolong your life, but to enhance comfort and quality of life. Nothing is done to keep you alive longer.  Hospice care is comfort care. It might provide food and fluids by mouth or help with bathing. Hospice care is given during the last stages of a terminal illness.  Strong pain medicine can be given to help keep you comfortable.    Do Not Resuscitate (DNR)  Would you want CPR if your heart stops while you’re a patient in a hospital or nursing home? If not, talk to your healthcare provider about issuing a DNR (Do-Not-Resuscitate) order.   DNRs and advance directives may not apply during anesthesia, in emergency rooms, or when emergency medical teams respond to a 911 call. Ask your healthcare provider how you can make sure your wishes will be followed. Also, a DNR will not prevent you from getting other kinds of needed medical  care such as treatment for pain, or bleeding.   Ludy last reviewed this educational content on 8/1/2021 © 2000-2021 [employer]. All rights reserved. This information is not intended as a substitute for professional medical care. Always follow your healthcare professional's instructions.          Stopping Life-Sustaining Treatments  Certain treatments can help sustain life when you have a serious illness. But as your illness progresses, there may come a time when these treatments are no longer a benefit. Decisions must then be made whether to continue or stop these treatments. This can be a hard task for you and your loved ones, but know that you’re not alone. Your healthcare provider and healthcare team will guide you through these treatment decisions. They will also help answer any questions you may have.     What are life-sustaining treatments?  Life-sustaining treatments help keep you alive if a vital body function fails. These treatments can include CPR and the use of machines to help with heart, lung, or kidney function. They can also include the use of tubes to deliver food, fluids, blood, and medicines to the body. Blood transfusions and antibiotics are also types of life-sustaining treatments.   What happens if life-sustaining treatments are continued?  These treatments can help extend your life. But they will not cure your illness. If you are near the end of your life, you may find it hard to handle the side effects and problems that can occur with these treatments. In this case, the treatments may be too much of a burden on your body. They may cause more harm than good. They may also disrupt the natural dying process and prolong suffering.   What happens if life-sustaining treatments are stopped?  If these treatments are stopped, the focus of treatment will shift to comfort care. This involves measures to control pain and other symptoms you may have. These measures are not meant to  cure your illness or help you live longer. Instead, they are meant to improve your quality of life during the time you have left. If you are in the end stage of your illness, you may be referred to hospice by your healthcare provider. Hospice provides end-of-life care. This includes emotional, spiritual, and social support for you and your loved ones.   How do I decide whether to stop life-sustaining treatments?  Your healthcare provider and healthcare team will talk with you about the specific treatments that are part of your care plan. If you want, you may include family and friends in these meetings. Here are some questions to think about or ask your healthcare team:   Is there is a chance that my illness will improve? Or will it continue to get worse?  What are my goals of care? Do I want to extend the time I have left? Or do I want to focus my care on comfort and managing symptoms?  How will stopping or continuing these treatments affect my health? Will they enhance my comfort and quality of life? Or will they cause more problems?  Consider your own values or serge. Also ask for advice from those who share your values.  How do I state my decisions about life-sustaining treatments?  Once you’ve made your decision to continue or stop specific treatments, you can tell your healthcare provider directly. It's best to also put your treatment wishes in writing with advance directives. These are legal forms related to healthcare decisions. Laws about advance directives vary from state to state. Ask your healthcare provider about what forms are needed to make sure your wishes will be followed. Some common forms include:   A durable power of  for healthcare or a healthcare proxy form.  This form lets you name a person to make treatment decisions for you when you can’t. This person is often called a healthcare proxy, medical or healthcare power of , or agent.  A living will.  This form tells others the  kinds of treatment you want or don’t want if you become too ill or injured to speak for yourself.  Orders for life-sustaining treatments.  These are actual healthcare provider's orders that must be followed by other medical providers. The form belongs to you, not to the healthcare provider or hospital.). These are legal forms obtained from your healthcare provider or hospital that document your wishes. The forms are known by different names depending on the state. Common names include:  MOLST (medical orders for life-sustaining treatment)  POLST (physician orders for life-sustaining treatment)  MOST (medical orders for scope of treatment)  POST (physician orders for scope of treatment)  TPOPP (transportable physician orders for patient preferences)     Keep in mind that you can change or cancel an advance directive at any time. Make it a practice to review your decisions each time there is a change in your health or goals of care. Also be sure to tell your healthcare proxy and loved ones of any changes in your decisions.   Deciding whether to stop life-sustaining treatments for a loved one   The decision to stop treatment ideally is made with the person’s consent. If the person is not capable of making decisions and has no advance directive, the decision falls to the person’s healthcare proxy or other adult. If you need to make a decision about stopping treatment for a loved one, start by talking to his or her healthcare provider. Review the goals of care and the benefits and burdens of specific treatments on your loved one’s health. Also think about your loved one’s wishes and values. If needed, seek advice from other healthcare team members, like a  or .   "[employer] last reviewed this educational content on 12/1/2019    © 4300-4973 [employer]. All rights reserved. This information is not intended as a substitute for professional medical care. Always follow your  healthcare professional's instructions.

## 2024-05-23 ENCOUNTER — TELEPHONE (OUTPATIENT)
Dept: INTERNAL MEDICINE CLINIC | Facility: CLINIC | Age: 84
End: 2024-05-23

## 2024-05-23 DIAGNOSIS — E87.6 HYPOKALEMIA: ICD-10-CM

## 2024-05-23 NOTE — TELEPHONE ENCOUNTER
Patient calling, she was asked to call and let us know what medications she is taking. Patient had visit on May 17th and this is in follow up to that.     Patient has 2 script for potassium - wanting to know what directions she is to follow-   She has been taking Potassium 20 MEQ 2 tabs BID, new RX says 20MEQ one tab BID - please advise.     Valsartan - 160mg 1 tab in the morning and 1/2 at night  Allopurinol 300 1 tab daily   Potassium 20 MEQ 2 every AM and 2 evening - ?? One BID or two BID   Levothyroxine 137 mcg 1 daily   Metoprolol ER 100mg 1 daily  Furosemide 20mg 1 daily  Atorvastatin 40mg 1 daily at bedtime  Montelukast 10 mg daily - has not started this yet but has script at pharmacy for it.     Vit D3 1000 units daily   B complex generic 1 tab daily

## 2024-05-24 NOTE — TELEPHONE ENCOUNTER
Problem is that is what the last office visit note said but what is she actually doing she was confused about her medications that I should call back and give me a list exactly what she was doing.  She has a regimen but she did not remember it offhand.

## 2024-05-24 NOTE — TELEPHONE ENCOUNTER
Not sure where she was taking when she did the blood draw on February 6, 2024.  That is the same dose she should be taking..  Please update med list.

## 2024-05-24 NOTE — TELEPHONE ENCOUNTER
First attempt was made today [see below note]; [employer] message sent requesting a call back [1st attempt]

## 2024-05-24 NOTE — TELEPHONE ENCOUNTER
Unable to leave voicemail due to mailbox being full. Will try at another time. First attempt.      Per office visit on 2/06 and 5/17 patient is to Take 1 tablet (20 mEq total) by mouth 2 (two) times daily of potassium chloride 20 MEQ Oral Tab CR.

## 2024-05-28 RX ORDER — POTASSIUM CHLORIDE 20 MEQ/1
20 TABLET, EXTENDED RELEASE ORAL 2 TIMES DAILY
Qty: 180 TABLET | Refills: 3 | OUTPATIENT
Start: 2024-05-28

## 2024-05-28 RX ORDER — POTASSIUM CHLORIDE 20 MEQ/1
40 TABLET, EXTENDED RELEASE ORAL 2 TIMES DAILY
Qty: 360 TABLET | Refills: 3 | Status: SHIPPED | OUTPATIENT
Start: 2024-05-28

## 2024-05-28 NOTE — TELEPHONE ENCOUNTER
Patient contacted.  States she has been taking potassium chloride 20 meq, 2 int the am and 2 in the pm.  New prescription pended for signature.  Denies questions regarding her other medications.

## 2024-06-07 ENCOUNTER — NURSE TRIAGE (OUTPATIENT)
Dept: INTERNAL MEDICINE CLINIC | Facility: CLINIC | Age: 84
End: 2024-06-07

## 2024-06-07 NOTE — TELEPHONE ENCOUNTER
Advised patient of Dr. Adair's note. Patient verbalized understanding and declined any other provider. Advised patient that if does not want to see any other provider today then can go to urgent care in [place]-address provided. Patient agreed and stated that she'll see.

## 2024-06-07 NOTE — TELEPHONE ENCOUNTER
Action Requested: Summary for Provider     []  Critical Lab, Recommendations Needed  [x] Need Additional Advice  []   FYI    []   Need Orders  [] Need Medications Sent to Pharmacy  []  Other     SUMMARY: Dr. Adair, patient declined to see another provider. Requests to be seen today or send prescription. Patient says she does not use any medications over the counter. Please advise, thank you.     Reason for call: Cough - worsening for 3 days  Onset: 3 days    Spoke to patient, full name and date of birth verified. Patient reports coughing kept her up all night, when last saw Dr. Adair on 5/17/24 she had an intermittent cough, now it is constant. Patient reports she does not take over the counter medications.     Offered appointments today with Dr. Esteves, patient declined.   Recommended Immediate Care, patient declined.   Patient would like to see Dr. Adair only, or have doctor send a prescription for the cough and headache, rated 6/10.    Care Advice    Patient/Caregiver understands and will follow care advice?: No, wishes to speak to PCP           Cough-A-Kylie Sargent RN Fri Jun 07, 2024 08:49 AM      Disposition and First Aid       SEE IN OFFICE TODAY OR TOMORROW:   * You need to be examined. Let me give you an appointment.  * IF NO AVAILABLE OFFICE APPOINTMENTS: You need to be seen in an Urgent Care Center.   Patient declined appointment with fellow provider and declined to go to the Urgent Care.                            Reason for Disposition   Patient wants to be seen    Answer Assessment - Initial Assessment Questions  1. ONSET: \"When did the cough begin?\"       3 days  2. SEVERITY: \"How bad is the cough today?\"       Kept patient up all night  3. SPUTUM: \"Describe the color of your sputum\" (none, dry cough; clear, white, yellow, green)      none  4. HEMOPTYSIS: \"Are you coughing up any blood?\" If so ask: \"How much?\" (flecks, streaks, tablespoons, etc.)      no  5. DIFFICULTY BREATHING: \"Are you  having difficulty breathing?\" If Yes, ask: \"How bad is it?\" (e.g., mild, moderate, severe)     - MILD: No SOB at rest, mild SOB with walking, speaks normally in sentences, can lie down, no retractions, pulse < 100.     - MODERATE: SOB at rest, SOB with minimal exertion and prefers to sit, cannot lie down flat, speaks in phrases, mild retractions, audible wheezing, pulse 100-120.     - SEVERE: Very SOB at rest, speaks in single words, struggling to breathe, sitting hunched forward, retractions, pulse > 120       Coughing frequently during call  6. FEVER: \"Do you have a fever?\" If Yes, ask: \"What is your temperature, how was it measured, and when did it start?\"      No fever  7. CARDIAC HISTORY: \"Do you have any history of heart disease?\" (e.g., heart attack, congestive heart failure)       Yes pacemaker and defibrilator  8. LUNG HISTORY: \"Do you have any history of lung disease?\"  (e.g., pulmonary embolus, asthma, emphysema)      Pulmonary hypertension  9. PE RISK FACTORS: \"Do you have a history of blood clots?\" (or: recent major surgery, recent prolonged travel, bedridden)      No  10. OTHER SYMPTOMS: \"Do you have any other symptoms?\" (e.g., runny nose, wheezing, chest pain)        Bad headache, pain is 6/10  11. PREGNANCY: \"Is there any chance you are pregnant?\" \"When was your last menstrual period?\"        N/A  12. TRAVEL: \"Have you traveled out of the country in the last month?\" (e.g., travel history, exposures)        No    Protocols used: Cough-A-[place]

## 2024-07-19 ENCOUNTER — TELEPHONE (OUTPATIENT)
Dept: INTERNAL MEDICINE CLINIC | Facility: CLINIC | Age: 84
End: 2024-07-19

## 2024-07-19 NOTE — TELEPHONE ENCOUNTER
Unable to reach patient for medication adherence consult. LVM for patient to call me back at [phone].

## 2024-07-22 NOTE — TELEPHONE ENCOUNTER
Patient returned my call for medication adherence consult. Patient overdue for refill on atorvastatin per insurance report.    Patient reports that she discontinued atorvastatin.     Provided education on importance of adherence. Addressed patient's questions and/or concerns with medication.

## 2024-07-29 DIAGNOSIS — E04.2 MULTIPLE THYROID NODULES: ICD-10-CM

## 2024-07-29 DIAGNOSIS — C73 PAPILLARY THYROID CARCINOMA (HCC): ICD-10-CM

## 2024-07-30 ENCOUNTER — TELEPHONE (OUTPATIENT)
Dept: INTERNAL MEDICINE CLINIC | Facility: CLINIC | Age: 84
End: 2024-07-30

## 2024-07-30 RX ORDER — PANTOPRAZOLE SODIUM 40 MG/1
40 TABLET, DELAYED RELEASE ORAL
Qty: 30 TABLET | Refills: 1 | Status: SHIPPED | OUTPATIENT
Start: 2024-07-30

## 2024-07-30 RX ORDER — METOPROLOL TARTRATE 100 MG/1
100 TABLET ORAL DAILY
COMMUNITY
Start: 2024-07-30

## 2024-07-30 RX ORDER — FUROSEMIDE 20 MG/1
20 TABLET ORAL DAILY
COMMUNITY
Start: 2024-06-04

## 2024-07-30 RX ORDER — ALLOPURINOL 300 MG/1
300 TABLET ORAL DAILY
COMMUNITY
Start: 2024-07-29

## 2024-07-30 NOTE — TELEPHONE ENCOUNTER
Has she been taking the omeprazole if not then if she wants the pantoprazole she can do.  But if she is taking omeprazole she will take pantoprazole in place?

## 2024-07-31 RX ORDER — ATORVASTATIN CALCIUM 40 MG/1
40 TABLET, FILM COATED ORAL NIGHTLY
Qty: 90 TABLET | Refills: 1 | Status: SHIPPED | OUTPATIENT
Start: 2024-07-31

## 2024-07-31 RX ORDER — OMEPRAZOLE 40 MG/1
40 CAPSULE, DELAYED RELEASE ORAL DAILY
Qty: 90 CAPSULE | Refills: 3 | OUTPATIENT
Start: 2024-07-31

## 2024-07-31 RX ORDER — FOLIC ACID 1 MG/1
1 TABLET ORAL WEEKLY
Qty: 12 CAPSULE | Refills: 1 | Status: SHIPPED | OUTPATIENT
Start: 2024-07-31

## 2024-07-31 RX ORDER — TIZANIDINE 4 MG/1
4 TABLET ORAL NIGHTLY
Qty: 30 TABLET | Refills: 1 | Status: SHIPPED | OUTPATIENT
Start: 2024-07-31

## 2024-07-31 NOTE — TELEPHONE ENCOUNTER
Left message to patient to call back.   Also [employer] message with MD recommendation sent to patient.        No future appointments.

## 2024-07-31 NOTE — TELEPHONE ENCOUNTER
Please review.  Protocol failed / Has no protocol.    Vit D 50,000u  last written 4/2023 for total 5-month supply. Patient has not had refills of the high dose filled in past 6 months.    The original prescription was discontinued on 9/15/2023 by Mildred Collins, RN. Renewing this prescription may not be appropriate.       Atorvastatin 40mg:     The original prescription was discontinued on 5/17/2024 by Ana Adair MD for the following reason: Patient discontinued. Renewing this prescription may not be appropriate.     Requested Prescriptions   Pending Prescriptions Disp Refills    TIZANIDINE 4 MG Oral Tab [Pharmacy Med Name: TIZANIDINE 4MG TABLETS] 30 tablet 1     Sig: TAKE 1 TABLET(4 MG) BY MOUTH NIGHTLY       There is no refill protocol information for this order       ATORVASTATIN 40 MG Oral Tab [Pharmacy Med Name: ATORVASTATIN 40MG TABLETS] 90 tablet      Sig: TAKE 1 TABLET(40 MG) BY MOUTH EVERY NIGHT       Cholesterol Medication Protocol Passed - 7/28/2024  3:07 PM        Passed - ALT < 80     Lab Results   Component Value Date    ALT 14 02/06/2024             Passed - ALT resulted within past year        Passed - Lipid panel within past 12 months     Lab Results   Component Value Date    CHOLEST 149 02/06/2024    TRIG 162 (H) 02/06/2024    HDL 52 02/06/2024    LDL 70 02/06/2024    VLDL 25 02/06/2024    TCHDLRATIO 3.0 09/12/2022    NONHDLC 97 02/06/2024             Passed - In person appointment or virtual visit in the past 12 mos or appointment in next 3 mos     Recent Outpatient Visits              2 months ago Acute systolic CHF (congestive heart failure), NYHA class 1 ([facility])    [facility] [place] Haq [place]    Office Visit    5 months ago [facility]    Office Visit    9 months ago [facility]omnia, unspecified type    [facility]    Office Visit    10 months ago Cardiomyopathy, nonischemic ([facility])    [facility]    Nurse Only    1 year ago Essential hypertension    [facility] [place] Delicia Forrest Maggie E., MD    Office Visit                           VITAMIN D3 1.25 MG (70821 UT) Oral Cap [Pharmacy Med Name: VITAMIN D3 50,000 IU (ADOLFO) CAP] 12 capsule 1     Sig: TAKE 1 CAPSULE BY MOUTH EVERY WEEK       There is no refill protocol information for this order          Recent Outpatient Visits              2 months ago Acute systolic CHF (congestive heart failure), NYHA class 1 ([facility]    Office Visit    5 months ago [facility]12 deficiency    [facility]    [facility]    9 months ago Insomnia, unspecified type    [facility] Alysia Esteves MD    Office Visit    10 months ago Cardiomyopathy, nonischemic ([facility])    [facility]    Nurse Only    1 year ago Essential hypertension    [facility]    [facility] Visit         Patient requested pantoprazole instead of omeprazole 7/30/24.

## 2024-08-01 RX ORDER — LEVOTHYROXINE SODIUM 0.15 MG/1
150 TABLET ORAL
Qty: 90 TABLET | Refills: 3 | Status: SHIPPED | OUTPATIENT
Start: 2024-08-01

## 2024-08-01 RX ORDER — LEVOTHYROXINE SODIUM 137 UG/1
137 TABLET ORAL
Qty: 90 TABLET | Refills: 1 | OUTPATIENT
Start: 2024-08-01

## 2024-08-01 NOTE — TELEPHONE ENCOUNTER
Refill passed per [facility] protocol.  Requested Prescriptions   Pending Prescriptions Disp Refills    LEVOTHYROXINE 150 MCG Oral Tab [Pharmacy Med Name: LEVOTHYROXINE 0.150MG (150MCG) TAB] 90 tablet 1     Sig: TAKE 1 TABLET(150 MCG) BY MOUTH BEFORE BREAKFAST       Thyroid Medication Protocol Passed - 7/29/2024  9:40 AM        Passed - TSH in past 12 months        Passed - Last TSH value is normal     Lab Results   Component Value Date    TSH 3.050 02/06/2024    THYROIDFUNC 0.22 (L) 03/21/2016    TSHT4 9.39 (H) 09/12/2022                 Passed - In person appointment or virtual visit in the past 12 mos or appointment in next 3 mos     Recent Outpatient Visits              2 months ago Acute systolic CHF (congestive heart failure), NYHA class 1 ([facility]    [facility] Visit    5 months ago B12 deficiency    [facility] Ana Adair MD    Office Visit    9 months ago Insomnia, unspecified type    [facility] Alysia Esteves MD    Office Visit    10 months ago Cardiomyopathy, nonischemic ([facility])    [facility]    Nurse Only    1 year ago Essential hypertension    [facility]    [facility]                       Refused Prescriptions Disp Refills    LEVOTHYROXINE 137 MCG Oral Tab [Pharmacy Med Name: LEVOTHYROXINE 0.137MG (137MCG) TAB] 90 tablet 1     Sig: TAKE 1 TABLET BY MOUTH BEFORE BREAKFAST       Thyroid Medication Protocol Passed - 7/29/2024  9:40 AM        Passed - TSH in past 12 months        Passed - Last TSH value is normal     Lab Results   Component Value Date    TSH 3.050 02/06/2024    THYROIDFUNC 0.22 (L) 03/21/2016    TSHT4 9.39 (H) 09/12/2022                 Passed - In person appointment or virtual visit in the past 12 mos or appointment in  next 3 mos     Recent Outpatient Visits              2 months ago Acute systolic CHF (congestive heart failure), NYHA class 1 ([facility])    [facility] [place] Delicia Forrest Maggie E., MD    Office Visit    5 months ago B12 deficiency    [facility] Ana Poe MD    Office Visit    9 months ago Insomnia, unspecified type    [facility]    [facility]    10 months ago Cardiomyopathy, nonischemic ([facility])    [facility] [place]    Nurse Only    1 year ago Essential hypertension    [facility] [place] Delicia Forrest Maggie E., MD    Office Visit                         Recent Outpatient Visits              2 months ago Acute systolic CHF (congestive heart failure), NYHA class 1 ([facility])    [facility] [place] Delicia Forrest Maggie E., MD    Office Visit    5 months ago B12 deficiency    [facility] Ana Poe MD    Office Visit    9 months ago Insomnia, unspecified type    [facility]    Office Visit    10 months ago Cardiomyopathy, nonischemic ([facility])    [facility] [place]    Nurse Only    1 year ago Essential hypertension    [facility] [place] Delicia Forrest Maggie E., MD    Office Visit

## 2024-08-06 RX ORDER — OMEPRAZOLE 40 MG/1
40 CAPSULE, DELAYED RELEASE ORAL DAILY
Qty: 90 CAPSULE | Refills: 1 | Status: SHIPPED | OUTPATIENT
Start: 2024-08-06

## 2024-08-08 ENCOUNTER — TELEPHONE (OUTPATIENT)
Dept: INTERNAL MEDICINE CLINIC | Facility: CLINIC | Age: 84
End: 2024-08-08

## 2024-08-08 NOTE — TELEPHONE ENCOUNTER
Patient asking to speak with a nurse. Patient states she has two new medications but does not know the reason for taking it

## 2024-08-09 NOTE — TELEPHONE ENCOUNTER
Left message to call office back with hours on patient's cell.  Spoke to daughter Soni (on GENNA) she will reach out to patient to have her listen to message and call us back.     2nd attempt.

## 2024-08-21 RX ORDER — FUROSEMIDE 20 MG
20 TABLET ORAL DAILY
Qty: 90 TABLET | Refills: 0 | Status: SHIPPED | OUTPATIENT
Start: 2024-08-21

## 2024-08-21 RX ORDER — MONTELUKAST SODIUM 10 MG/1
10 TABLET ORAL NIGHTLY
Qty: 30 TABLET | Refills: 3 | Status: SHIPPED | OUTPATIENT
Start: 2024-08-21

## 2024-08-21 NOTE — TELEPHONE ENCOUNTER
Dr. Adair please review below.  Patient contacted, states she did stop furosemide at one time, but states she was put back on it, unsure when, but states she has been taking the medication daily.  Nurse unable to find any follow up documentation regarding furosemide after her 05/17 visit.

## 2024-08-21 NOTE — TELEPHONE ENCOUNTER
Dear nursing staff,    Please call patient and verify if she is asking for a refill on her furosemide.    Patient discontinued this medication on 5/17/2024, but then added it as patient reported on 6/4/2024.    Per office visit with Dr. Adair 5/17/2024:  She is no longer on diuretics     Requested Prescriptions     Pending Prescriptions Disp Refills    FUROSEMIDE 20 MG Oral Tab [Pharmacy Med Name: FUROSEMIDE 20MG TABLETS] 90 tablet 0     Sig: TAKE 1 TABLET(20 MG) BY MOUTH DAILY    MONTELUKAST 10 MG Oral Tab [Pharmacy Med Name: MONTELUKAST 10MG TABLETS] 30 tablet 3     Sig: TAKE 1 TABLET(10 MG) BY MOUTH EVERY NIGHT

## 2024-09-17 ENCOUNTER — TELEPHONE (OUTPATIENT)
Dept: INTERNAL MEDICINE CLINIC | Facility: CLINIC | Age: 84
End: 2024-09-17

## 2024-09-22 NOTE — TELEPHONE ENCOUNTER
Sent patient [employer] message to clarify if she is taking the omeprazole or pantoprazole?    Refill passed per [facility] protocol.     Requested Prescriptions   Pending Prescriptions Disp Refills    PANTOPRAZOLE 40 MG Oral Tab EC [Pharmacy Med Name: PANTOPRAZOLE 40MG TABLETS] 30 tablet 1     Sig: TAKE 1 TABLET BY MOUTH EVERY MORNING BEFORE BREAKFAST       Gastrointestional Medication Protocol Passed - 9/17/2024 12:22 PM        Passed - In person appointment or virtual visit in the past 12 mos or appointment in next 3 mos     Recent Outpatient Visits              4 months ago Acute systolic CHF (congestive heart failure), NYHA class 1 ([facility]    Office Visit    7 months ago B12 deficiency    [facility] Ana Adair MD    Office Visit    11 months ago Insomnia, unspecified type    [facility] Alysia Esteves MD    Office Visit    1 year ago Cardiomyopathy, nonischemic ([facility])    [facility]    Nurse Only    1 year ago Essential hypertension    [facility]    [facility]          Future Appointments         Provider Department Appt Notes    In 1 month Ana Adair MD [facility] follow up policy informed to pt  pt informed to arrive 15 minutes prior to her appointment.

## 2024-09-23 RX ORDER — PANTOPRAZOLE SODIUM 40 MG/1
40 TABLET, DELAYED RELEASE ORAL
Qty: 90 TABLET | Refills: 3 | Status: SHIPPED | OUTPATIENT
Start: 2024-09-23

## 2024-09-23 NOTE — TELEPHONE ENCOUNTER
Called patient she is currently take only the pantoprozole,    See TE 7/30/24    Please advise which medication patient should continue to take    Stated she did not  the omeprazole, stated did not know that was ordered

## 2024-10-28 ENCOUNTER — OFFICE VISIT (OUTPATIENT)
Dept: INTERNAL MEDICINE CLINIC | Facility: CLINIC | Age: 84
End: 2024-10-28
Payer: MEDICARE

## 2024-10-28 VITALS
HEIGHT: 64.5 IN | SYSTOLIC BLOOD PRESSURE: 107 MMHG | BODY MASS INDEX: 28.64 KG/M2 | HEART RATE: 73 BPM | DIASTOLIC BLOOD PRESSURE: 60 MMHG | OXYGEN SATURATION: 100 % | WEIGHT: 169.81 LBS | TEMPERATURE: 98 F

## 2024-10-28 DIAGNOSIS — E87.6 HYPOKALEMIA: ICD-10-CM

## 2024-10-28 DIAGNOSIS — E11.65 UNCONTROLLED TYPE 2 DIABETES MELLITUS WITH HYPERGLYCEMIA (HCC): ICD-10-CM

## 2024-10-28 DIAGNOSIS — C73 PAPILLARY THYROID CARCINOMA (HCC): ICD-10-CM

## 2024-10-28 DIAGNOSIS — Z71.85 VACCINE COUNSELING: ICD-10-CM

## 2024-10-28 DIAGNOSIS — I10 ESSENTIAL HYPERTENSION: ICD-10-CM

## 2024-10-28 DIAGNOSIS — E53.8 B12 DEFICIENCY: ICD-10-CM

## 2024-10-28 DIAGNOSIS — M10.00 IDIOPATHIC GOUT, UNSPECIFIED CHRONICITY, UNSPECIFIED SITE: ICD-10-CM

## 2024-10-28 DIAGNOSIS — I50.21 ACUTE SYSTOLIC CHF (CONGESTIVE HEART FAILURE), NYHA CLASS 1 (HCC): Primary | ICD-10-CM

## 2024-10-28 DIAGNOSIS — R74.8 ELEVATED ALKALINE PHOSPHATASE MEASUREMENT: ICD-10-CM

## 2024-10-28 DIAGNOSIS — M54.50 ACUTE MIDLINE LOW BACK PAIN WITHOUT SCIATICA: ICD-10-CM

## 2024-10-28 DIAGNOSIS — E55.9 VITAMIN D DEFICIENCY: ICD-10-CM

## 2024-10-28 LAB
APPEARANCE: CLEAR
BILIRUBIN: NEGATIVE
CHOLEST SERPL-MCNC: 178 MG/DL (ref ?–200)
FASTING PATIENT LIPID ANSWER: YES
GLUCOSE (URINE DIPSTICK): NEGATIVE MG/DL
HDLC SERPL-MCNC: 39 MG/DL (ref 40–59)
KETONES (URINE DIPSTICK): NEGATIVE MG/DL
LDLC SERPL CALC-MCNC: 98 MG/DL (ref ?–100)
MULTISTIX LOT#: ABNORMAL NUMERIC
NITRITE, URINE: NEGATIVE
NONHDLC SERPL-MCNC: 139 MG/DL (ref ?–130)
OCCULT BLOOD: NEGATIVE
PH, URINE: 5 (ref 4.5–8)
PROTEIN (URINE DIPSTICK): NEGATIVE MG/DL
SPECIFIC GRAVITY: 1.02 (ref 1–1.03)
TRIGL SERPL-MCNC: 241 MG/DL (ref 30–149)
URINE-COLOR: YELLOW
UROBILINOGEN,SEMI-QN: 1 MG/DL (ref 0–1.9)
VIT D+METAB SERPL-MCNC: 52.3 NG/ML (ref 30–100)
VLDLC SERPL CALC-MCNC: 40 MG/DL (ref 0–30)

## 2024-10-28 PROCEDURE — 36415 COLL VENOUS BLD VENIPUNCTURE: CPT | Performed by: INTERNAL MEDICINE

## 2024-10-28 RX ORDER — KETOROLAC TROMETHAMINE 30 MG/ML
30 INJECTION, SOLUTION INTRAMUSCULAR; INTRAVENOUS ONCE
Status: COMPLETED | OUTPATIENT
Start: 2024-10-28 | End: 2024-10-28

## 2024-10-28 RX ORDER — KETOROLAC TROMETHAMINE 10 MG/1
10 TABLET, FILM COATED ORAL EVERY 8 HOURS
Qty: 15 TABLET | Refills: 0 | Status: SHIPPED | OUTPATIENT
Start: 2024-10-28

## 2024-10-28 RX ORDER — CYCLOBENZAPRINE HCL 5 MG
5 TABLET ORAL NIGHTLY
Qty: 5 TABLET | Refills: 0 | Status: SHIPPED | OUTPATIENT
Start: 2024-10-28 | End: 2024-11-02

## 2024-10-28 RX ADMIN — KETOROLAC TROMETHAMINE 30 MG: 30 INJECTION, SOLUTION INTRAMUSCULAR; INTRAVENOUS at 12:32:00

## 2024-10-28 NOTE — PROGRESS NOTES
HPI:    Patient ID: Rozina Haynes is a 83 year old female.    Chief Complaint   Patient presents with    Follow - Up     Patient states she is here for a follow up.         Patient here for follow up of Diabetes.  Has been taking medications regularly.    Checks sugars 0 times daily.  Fasting sugars average not check.  Denies symptoms of hypo nor hyperglycemia.  Watches diabetic diet, low salt.  Diabetic Flow sheet      10/28/2024    12:11 PM 10/28/2024    11:19 AM 5/18/2024    10:32 AM 5/17/2024    11:29 AM   DIABETIC FLOWSHEET ([facility])   BMI  28.7 kg/m2     Valsartan 1 po qam and 1/2 tab qpm. (160 MG TABS) 1 po qam and 1/2 tab qpm. (160 MG TABS)  1 po qam and 1/2 tab qpm. (160 MG TABS)  1 po qam and 1/2 tab qpm. (160 MG TABS)    Weight (enc vitals)  169 lb 12.8 oz     BP (enc vitals)  107/60     Last Foot Exam   5/17/2024    PHQ2 Score    2     HISTORY OF DIABETES COMPLICATIONS: :  History of Retinopathy: No.   History of Neuropathy: No.   History of Nephropathy: Yes.      ASSOCIATED COMPLICATIONS:   HTN: Yes.   Hyperlipidemia: Yes.   Coronary Artery Disease:  CAD,  HF, PAD  Cerebrovascular Disease: Yes.      MEALS:  2  meals a day, 11 AM first meal. 5 PM is dinner. Occ. Snacks. Not feel hungry.   Cooks at home    Hypertension  Patient is here for follow up of hypertension. BP at home: occ. checks. 130/70's.  mId day.  Has been compliant with medications.  Exercise level: exercises 3 times a  week (walks qam X 10 minutes) and has not been following low salt diet.  Weight has been down. Eats more at home. More microwave meals.   Wt Readings from Last 3 Encounters:   10/28/24 169 lb 12.8 oz (77 kg)   05/17/24 176 lb (79.8 kg)   02/06/24 177 lb 9.6 oz (80.6 kg)     BP Readings from Last 3 Encounters:   10/28/24 107/60   05/17/24 144/70   02/06/24 155/77     Labs:   Lab Results   Component Value Date/Time    GLU 81 02/06/2024 02:38 PM     02/06/2024 02:38 PM    K 4.2 02/06/2024 02:38 PM     02/06/2024 02:38 PM     CO2 28.0 02/06/2024 02:38 PM    CREATSERUM 0.91 02/06/2024 02:38 PM    CA 9.2 02/06/2024 02:38 PM    AST 19 02/06/2024 02:38 PM    ALT 14 02/06/2024 02:38 PM    TSH 3.050 02/06/2024 02:38 PM    T4F 1.2 02/06/2024 02:38 PM        Lab Results   Component Value Date/Time    CHOLEST 149 02/06/2024 02:38 PM    HDL 52 02/06/2024 02:38 PM    TRIG 162 (H) 02/06/2024 02:38 PM    LDL 70 02/06/2024 02:38 PM    NONHDLC 97 02/06/2024 02:38 PM            Wt Readings from Last 3 Encounters:   10/28/24 169 lb 12.8 oz (77 kg)   05/17/24 176 lb (79.8 kg)   02/06/24 177 lb 9.6 oz (80.6 kg)     BP Readings from Last 3 Encounters:   10/28/24 107/60   05/17/24 144/70   02/06/24 155/77     Labs:   Lab Results   Component Value Date/Time    GLU 81 02/06/2024 02:38 PM     02/06/2024 02:38 PM    K 4.2 02/06/2024 02:38 PM     02/06/2024 02:38 PM    CO2 28.0 02/06/2024 02:38 PM    CREATSERUM 0.91 02/06/2024 02:38 PM    CA 9.2 02/06/2024 02:38 PM    AST 19 02/06/2024 02:38 PM    ALT 14 02/06/2024 02:38 PM    TSH 3.050 02/06/2024 02:38 PM    T4F 1.2 02/06/2024 02:38 PM        Lab Results   Component Value Date/Time    CHOLEST 149 02/06/2024 02:38 PM    HDL 52 02/06/2024 02:38 PM    TRIG 162 (H) 02/06/2024 02:38 PM    LDL 70 02/06/2024 02:38 PM    NONHDLC 97 02/06/2024 02:38 PM          Chronic LBP but this is different.     Low Back Pain  This is a new problem. The current episode started 1 to 4 weeks ago. The problem occurs constantly. The problem is unchanged. The pain is present in the lumbar spine. The quality of the pain is described as aching. The pain does not radiate. The pain is at a severity of 5/10. The pain is moderate. The symptoms are aggravated by bending, position, standing and sitting. Pertinent negatives include no abdominal pain, bladder incontinence, bowel incontinence, chest pain, dysuria, fever, headaches, leg pain, numbness, paresis, paresthesias, pelvic pain, perianal numbness, tingling or weakness. She has  tried nothing for the symptoms.         Review of Systems   Constitutional:  Positive for activity change. Negative for appetite change, chills, diaphoresis, fatigue, fever and unexpected weight change.   Respiratory:  Negative for apnea, cough, choking, chest tightness, shortness of breath, wheezing and stridor.    Cardiovascular:  Negative for chest pain, palpitations and leg swelling.   Gastrointestinal:  Negative for abdominal distention, abdominal pain and bowel incontinence.   Endocrine: Negative for cold intolerance, heat intolerance, polydipsia, polyphagia and polyuria.   Genitourinary:  Negative for bladder incontinence, dysuria and pelvic pain.   Neurological:  Negative for dizziness, tingling, tremors, seizures, syncope, facial asymmetry, speech difficulty, weakness, light-headedness, numbness, headaches and paresthesias.   Psychiatric/Behavioral:  Positive for sleep disturbance.    All other systems reviewed and are negative.        Current Outpatient Medications   Medication Sig Dispense Refill    Ketorolac Tromethamine 10 MG Oral Tab Take 1 tablet (10 mg total) by mouth every 8 (eight) hours. 15 tablet 0    cyclobenzaprine 5 MG Oral Tab Take 1 tablet (5 mg total) by mouth nightly for 5 days. 5 tablet 0    pantoprazole 40 MG Oral Tab EC TAKE 1 TABLET BY MOUTH EVERY MORNING BEFORE BREAKFAST 90 tablet 3    furosemide 20 MG Oral Tab Take 1 tablet (20 mg total) by mouth daily. 90 tablet 0    montelukast 10 MG Oral Tab Take 1 tablet (10 mg total) by mouth nightly. 30 tablet 3    levothyroxine 150 MCG Oral Tab Take 1 tablet (150 mcg total) by mouth before breakfast. 90 tablet 3    tiZANidine 4 MG Oral Tab Take 1 tablet (4 mg total) by mouth nightly. 30 tablet 1    atorvastatin 40 MG Oral Tab Take 1 tablet (40 mg total) by mouth nightly. 90 tablet 1    Cholecalciferol (VITAMIN D3) 1.25 MG (60283 UT) Oral Cap Take 1 capsule by mouth once a week. 12 capsule 1    allopurinol 300 MG Oral Tab Take 1 tablet (300 mg  total) by mouth daily.      metoprolol tartrate 100 MG Oral Tab Take 1 tablet (100 mg total) by mouth daily.      potassium chloride 20 MEQ Oral Tab CR Take 2 tablets (40 mEq total) by mouth 2 (two) times daily. 360 tablet 3    valsartan 160 MG Oral Tab 1 po qam and 1/2 tab qpm. 180 tablet 0    metoprolol succinate  MG Oral Tablet 24 Hr Take 1 tablet (100 mg total) by mouth daily.      triamcinolone 0.1 % External Cream Apply 1 Application topically 2 (two) times daily. 45 g 0    Vitamin B12 100 MCG Oral Tab Take 1 tablet (100 mcg total) by mouth daily.      Cholecalciferol 50 MCG (2000 UT) Oral Cap Take 2,000 Units by mouth daily.        aspirin 81 MG Oral Tab Take 1 tablet (81 mg total) by mouth daily.       Allergies:Allergies[1]    HISTORY:  Past Medical History:    Anxiety state, unspecified    Degeneration of lumbar or lumbosacral intervertebral disc    Gout    IBS (irritable bowel syndrome)    Left bundle branch block (LBBB) on electrocardiogram    AbNL regaadenosine. CTA shows mild soft plaque RCA calcium score zero.     Migraine    Papillary thyroid carcinoma (HCC)    TIA (transient ischemic attack)      Past Surgical History:   Procedure Laterality Date    Cholecystectomy      Colonoscopy      Other surgical history      LASER SURGERY ON GALL BLADDER STONES    Thyroidectomy  8/31/15    Total thyroidectomy. 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     Upper gi endoscopy,exam        Family History   Problem Relation Age of Onset    Hypertension Mother     Heart Disease Mother 40        CAD S/P MI.     Cancer Brother         Lung cancer. Construction.       Social History:   Social History     Socioeconomic History    Marital status:    Tobacco Use    Smoking status: Never    Smokeless tobacco: Never   Vaping Use    Vaping status: Never Used   Substance and Sexual Activity    Alcohol use: Yes     Comment: occ    Drug use: No   Other Topics Concern     Service No    Blood  Transfusions No    Caffeine Concern Yes     Comment: sodas 3-4 cans daily    Occupational Exposure No    Hobby Hazards No    Sleep Concern Yes     Comment: has anxiety , does not sleep well.    Stress Concern No    Weight Concern Yes     Comment: lost 20 lb in in 3-4 month    Special Diet No    Back Care No    Exercise Yes     Comment: cardio    Bike Helmet No    Seat Belt Yes    Self-Exams Yes        PHYSICAL EXAM:   /60 (BP Location: Right arm, Patient Position: Sitting, Cuff Size: adult)   Pulse 73   Temp 98.3 °F (36.8 °C) (Oral)   Ht 5' 4.5\" (1.638 m)   Wt 169 lb 12.8 oz (77 kg)   SpO2 100%   BMI 28.70 kg/m²   BP Readings from Last 3 Encounters:   10/28/24 107/60   05/17/24 144/70   02/06/24 155/77     Wt Readings from Last 3 Encounters:   10/28/24 169 lb 12.8 oz (77 kg)   05/17/24 176 lb (79.8 kg)   02/06/24 177 lb 9.6 oz (80.6 kg)       Physical Exam  Vitals and nursing note reviewed.   Constitutional:       General: She is not in acute distress.     Appearance: Normal appearance. She is well-developed. She is not ill-appearing, toxic-appearing or diaphoretic.      Interventions: She is not intubated.  Neck:      Thyroid: No thyroid mass or thyromegaly.      Trachea: Trachea and phonation normal.   Cardiovascular:      Rate and Rhythm: Normal rate and regular rhythm.      Pulses: Normal pulses. No decreased pulses.           Carotid pulses are 2+ on the right side and 2+ on the left side.       Radial pulses are 2+ on the right side and 2+ on the left side.        Dorsalis pedis pulses are 2+ on the right side and 2+ on the left side.        Posterior tibial pulses are 2+ on the right side and 2+ on the left side.      Heart sounds: Normal heart sounds, S1 normal and S2 normal.   Pulmonary:      Effort: Pulmonary effort is normal. No tachypnea, bradypnea, accessory muscle usage, prolonged expiration, respiratory distress or retractions. She is not intubated.      Breath sounds: Normal breath sounds  and air entry. No stridor, decreased air movement or transmitted upper airway sounds. No decreased breath sounds, wheezing, rhonchi or rales.   Chest:      Chest wall: No tenderness.   Abdominal:      General: There is no distension.      Palpations: Abdomen is soft.      Tenderness: There is no abdominal tenderness.   Musculoskeletal:      Lumbar back: Deformity, spasms (L paraspianl muscle spasm.) and tenderness present. No swelling, edema, signs of trauma, lacerations or bony tenderness. Decreased range of motion. Negative right straight leg raise test and negative left straight leg raise test. Scoliosis present.      Right hip: No deformity, lacerations, tenderness, bony tenderness or crepitus. Normal range of motion. Normal strength.      Left hip: No deformity, lacerations, tenderness, bony tenderness or crepitus. Normal range of motion. Normal strength.      Right lower leg: No edema.      Left lower leg: No edema.   Skin:     General: Skin is warm and dry.   Neurological:      Mental Status: She is alert and oriented to person, place, and time.   Psychiatric:         Behavior: Behavior normal. Behavior is cooperative.         Thought Content: Thought content normal.              ASSESSMENT/PLAN:     Encounter Diagnoses   Name Primary?    Acute systolic CHF (congestive heart failure), NYHA class 1 (HCC) clinically euvolemic.   Yes    B12 deficiency stable.       Essential hypertension stable.Careful with diet and excercise at least 30 minutes 3-4 times a week. Check blood pressures at different times on different days. Can purchase own blood pressure monitor. If not, check at local pharmacy. Bake foods more and grill occasionally. Avoid fried foods. No salt. Use other seasonings.         Idiopathic gout, unspecified chronicity, unspecified site no episodes.       Elevated alkaline phosphatase measurement check blood.       Vaccine counseling flu shot today.  Would recommend COVID booster in 2 weeks at local  pharmacy.  Check on insurance about Shingrix vaccine.Recommend shingles vaccine.  There is 2 doses of the vaccine  by 2 months 6 months apart.  Check on insurance coverage on shingles vaccine.  May be covered better at the pharmacy.  Separate shingles vaccine from all of the vaccines by at least 1 to 2 weeks.  Discussed about side effects of vaccine.        Vitamin D deficiency check blood.       Uncontrolled type 2 diabetes mellitus with hyperglycemia (HCC)? Control.  Check blood.Careful with diet and excercise at least 30 minutes 3-4 times a week. Check sugars at different times on different dates. Careful with low sugars. Carry something with you and check sugar if can. Can carry christel cracker, etc. Decrease carbohydrates. But also, careful with fruits and natural sugars.One serving a day and no more than 1 handful every day. Check feet  every AM and careful with sores and ulcers on feet bilaterally. Check eyes every year with dilated eye exam.  Check sugars.  2-hour postmeal should be less than 140s.  Pre-meal should be 's.  Both equally affected A1c.  Discussed importance of glycemic control to prevent complications of diabetes  -Discussed complications of diabetes include retinopathy, neuropathy, nephropathy and cardiovascular disease  -Discussed ABCs of DM  -Discussed importance of SBGM  -Discussed importance of low CHO diet, recommend 45gm per meal or 135gm per day  -Follow-up with optho  -Normotensive        Papillary thyroid carcinoma (HCC) Check blood and US.        Hypokalemia Check blood.         Acute midline low back pain without sciatica discussed with patient of options.  Will try Toradol shot today and then to begin Toradol by mouth to begin tomorrow October 29, 2024 for breakfast lunch and dinner with food for 5 days with Flexeril 5 mg at night for 5 nights.  Discussed about side effects and use of both medications.No motrin, ibuprofen, advil, alleve, naprosyn  with these  medications.  Check x-rays.      Weight loss.  May be from diminished appetite.    Orders Placed This Encounter   Procedures    Lipid Panel    Hemoglobin A1C    Thyroglobulin Antibody and Tumor Marker    URINALYSIS, AUTO, W/O SCOPE    INFLUENZA VAC HIGH DOSE PRSV FREE       Meds This Visit:  Requested Prescriptions     Signed Prescriptions Disp Refills    Ketorolac Tromethamine 10 MG Oral Tab 15 tablet 0     Sig: Take 1 tablet (10 mg total) by mouth every 8 (eight) hours.    cyclobenzaprine 5 MG Oral Tab 5 tablet 0     Sig: Take 1 tablet (5 mg total) by mouth nightly for 5 days.       Imaging & Referrals:  INFLUENZA VAC HIGH DOSE PRSV FREE  OPHTHALMOLOGY - EXTERNAL  XR LUMBAR SPINE (MIN 2 VIEWS) (CPT=72100)      Return to clinic after May 17, 2025 for physical.         [1]   Allergies  Allergen Reactions    Erythromycin HIVES

## 2024-10-28 NOTE — PATIENT INSTRUCTIONS
ASSESSMENT/PLAN:     Encounter Diagnoses   Name Primary?    Acute systolic CHF (congestive heart failure), NYHA class 1 (HCC) clinically euvolemic.   Yes    B12 deficiency stable.       Essential hypertension stable.Careful with diet and excercise at least 30 minutes 3-4 times a week. Check blood pressures at different times on different days. Can purchase own blood pressure monitor. If not, check at local pharmacy. Bake foods more and grill occasionally. Avoid fried foods. No salt. Use other seasonings.         Idiopathic gout, unspecified chronicity, unspecified site no episodes.       Elevated alkaline phosphatase measurement check blood.       Vaccine counseling flu shot today.  Would recommend COVID booster in 2 weeks at local pharmacy.  Check on insurance about Shingrix vaccine.Recommend shingles vaccine.  There is 2 doses of the vaccine  by 2 months 6 months apart.  Check on insurance coverage on shingles vaccine.  May be covered better at the pharmacy.  Separate shingles vaccine from all of the vaccines by at least 1 to 2 weeks.  Discussed about side effects of vaccine.        Vitamin D deficiency check blood.       Uncontrolled type 2 diabetes mellitus with hyperglycemia (HCC)? Control.  Check blood.Careful with diet and excercise at least 30 minutes 3-4 times a week. Check sugars at different times on different dates. Careful with low sugars. Carry something with you and check sugar if can. Can carry christel cracker, etc. Decrease carbohydrates. But also, careful with fruits and natural sugars.One serving a day and no more than 1 handful every day. Check feet  every AM and careful with sores and ulcers on feet bilaterally. Check eyes every year with dilated eye exam.  Check sugars.  2-hour postmeal should be less than 140s.  Pre-meal should be 's.  Both equally affected A1c.  Discussed importance of glycemic control to prevent complications of diabetes  -Discussed complications of diabetes  include retinopathy, neuropathy, nephropathy and cardiovascular disease  -Discussed ABCs of DM  -Discussed importance of SBGM  -Discussed importance of low CHO diet, recommend 45gm per meal or 135gm per day  -Follow-up with optho  -Normotensive        Papillary thyroid carcinoma (HCC) Check blood and US.        Hypokalemia Check blood.  I have a sitting in neck as it is harder for her to move       Acute midline low back pain without sciatica discussed with patient of options.  Will try Toradol shot today and then to begin Toradol by mouth to begin tomorrow October 29, 2024 for breakfast lunch and dinner with food for 5 days with Flexeril 5 mg at night for 5 nights.  Discussed about side effects and use of both medications.No motrin, ibuprofen, advil, alleve, naprosyn  with these medications.  Check x-rays.       General Neck and Back Pain    Both neck and back pain are usually caused by injury to the muscles or ligaments of the spine. Sometimes the disks that separate each bone of the spine may cause pain by pressing on a nearby nerve. Back and neck pain may appear after a sudden twisting or bending force (such as in a car accident), or sometimes after a simple awkward movement. In either case, muscle spasm is often present and adds to the pain.  Acute neck and back pain usually gets better in 1 to 2 weeks. Pain related to disk disease, arthritis in the spinal joints or spinal stenosis (narrowing of the spinal canal) can become chronic and last for months or years.  Back and neck pain are common problems. Most people feel better in 1 or 2 weeks, and most of the rest in 1 to 2 months. Most people can remain active.  People have and describe pain differently.  Pain can be sharp, stabbing, shooting, aching, cramping, or burning  Movement, standing, bending, lifting, sitting, or walking may worsen the pain  Pain can be localized to one spot or area, or it can be more generalized  Pain can spread or radiate upwards,  downwards, to the front, or go down your arms  Muscle spasm may occur.  Most of the time mechanical problems with the muscles or spine cause the pain. it is usually caused by an injury, whether known or not, to the muscles or ligaments. While illnesses can cause back pain, it is usually not caused by a serious illness. Pain is usually related to physical activity, whether sports, exercise, work, or normal activity. Sometimes it can occur without an identifiable cause. This can happen simply by stretching or moving wrong, without noting pain at the time. Other causes include:  Overexertion, lifting, pushing, pulling incorrectly or too aggressively.  Sudden twisting, bending or stretching from an accident (car or fall), or accidental movement.  Poor posture  Poor conditioning, lack of regular exercise  Spinal disc disease or arthritis  Stress  Pregnancy, or illness like appendicitis, bladder or kidney infection, pelvic infections   Home care  For neck pain: Use a comfortable pillow that supports the head and keeps the spine in a neutral position. The position of the head should not be tilted forward or backward.  When in bed, try to find a position of comfort. A firm mattress is best. Try lying flat on your back with pillows under your knees. You can also try lying on your side with your knees bent up towards your chest and a pillow between your knees.  At first, do not try to stretch out the sore spots. If there is a strain, it is not like the good soreness you get after exercising without an injury. In this case, stretching may make it worse.  Don't sit for long periods, as in long car rides or other travel. This puts more stress on the lower back than standing or walking.  During the first 24 to 72 hours after an injury, apply an ice pack to the painful area for 20 minutes and then remove it for 20 minutes over a period of 60 to 90 minutes or several times a day.   You can alternate ice and heat therapies. Talk  with your healthcare provider about the best treatment for your back or neck pain. As a safety precaution, do not use a heating pad at bedtime. Sleeping with a heating pad can lead to skin burns or tissue damage.  Therapeutic massage can help relax the back and neck muscles without stretching them.  Be aware of safe lifting methods and do not lift anything over 15 pounds until all the pain is gone.  Medicines  Talk to your healthcare provider before using medicine, especially if you have other medical problems or are taking other medicines.  You may use over-the-counter medicine to control pain, unless another pain medicine was prescribed. If you have chronic conditions like diabetes, liver or kidney disease, stomach ulcers,  gastrointestinal bleeding, or are taking blood thinner medicines.  Be careful if you are given pain medicines, narcotics, or medicine for muscle spasm. They can cause drowsiness, and can affect your coordination, reflexes, and judgment. Do not drive or operate heavy machinery.  Follow-up care  Follow up with your healthcare provider, or as advised. Physical therapy or further tests may be needed.  If X-rays were taken, you will be notified of any new findings that may affect your care.  Call 911  Call 911 if any of the following occur:  Trouble breathing  Confusion  Very drowsy or trouble awakening  Fainting or loss of consciousness  Rapid or very slow heart rate  Loss of bowel or bladder control  When to seek medical advice  Call your healthcare provider right away if any of these occur:  Pain becomes worse or spreads into your arms or legs  Weakness, numbness or pain in one or both arms or legs  Numbness in the groin area  Difficulty walking  Fever of 100.4ºF (38ºC) or higher, or as directed by your healthcare provider  Date Last Reviewed: 7/1/2016 © 2000-2019 [employer]. [address] [place] [address], [place] [address]. All rights reserved. This information is not intended as a  substitute for professional medical care. Always follow your healthcare professional's instructions.        Back Care Tips    Caring for your back  These are things you can do to prevent a recurrence of acute back pain and to reduce symptoms from chronic back pain:  Stay at a healthy weight. If you are overweight, losing weight will help most types of back pain.  Exercise is an important part of recovery from most types of back pain. The muscles behind and in front of the spine support the back. This means strengthening both the back muscles and the abdominal muscles will provide better support for your spine.   Swimming and brisk walking are good overall exercises to improve your fitness level.  Practice safe lifting methods (see below).  Practice good posture when sitting, standing, and walking. Don't sit for a long time. This puts more stress on the lower back than standing or walking.  Wear quality shoes with good arch support. Foot and ankle alignment can affect back symptoms. Don't wear high heels.  Therapeutic massage can help relax the back muscles without stretching them.  During the first 24 to 72 hours after an acute injury or flare-up of chronic back pain, put an ice pack on the painful area for 20 minutes and then remove it for 20 minutes. Do thisover a period of 60 to 90 minutes, or several times a day. As a safety precaution, don't use a heating pad at bedtime. Sleeping on a heating pad can lead to skin burns or tissue damage.  You can alternate using ice and heat.  Medicines  Talk with your healthcare provider before using medicines, especially if you have other health problems or are taking other medicines.  You may use over-the-counter medicines, such as acetaminophen, ibuprofen, or naprosyn to control pain, unless your healthcare provider prescribed other pain medicine. Talk with your healthcare provider before taking any medicines if you have a chronic condition such ass diabetes, liver or kidney  disease, stomach ulcers, or digestive bleeding, or are taking blood thinners.  Be careful if you are given prescription pain medicines, opioids, or medicine for muscle spasm. They can cause drowsiness, and affect your coordination, reflexes, and judgment. Don't drive or operate heavy machinery while taking these types of medicines. Take prescription pain medicine only as prescribed by your healthcare provider.  Lumbar stretch  This simple stretch will help relax muscle spasm and keep your back more limber. If exercise makes your back pain worse, don’t do it.  Lie on your back with your knees bent and both feet on the ground.  Slowly raise your left knee to your chest as you flatten your lower back against the floor. Hold for 5 seconds.  Relax and repeat the exercise with your right knee.  Do 10 of these exercises for each leg.  Safe lifting method  Don’t bend over at the waist to lift an object off the floor.  Instead, bend your knees and hips in a squat.   Keep your back and head upright  Hold the object close to your body, directly in front of you.  Straighten your legs to lift the object.   Lower the object to the floor in the reverse fashion.  If you must slide something across the floor, push it.    Posture tips  Sitting  Sit in chairs with straight backs or low-back support. Keep your knees lower than your hips, with your feet flat on the floor.  When driving, sit up straight. Adjust the seat forward so you are not leaning toward the steering wheel.  A small pillow or rolled towel behind your lower back may help if you are driving long distances.   Standing  When standing for long periods, shift most of your weight to one leg at a time. Switch legs every few minutes.   Sleeping  The best way to sleep is on your side with your knees bent. Put a low pillow under your head to support your neck in a neutral spine position. Don't use thick pillows that bend your neck to one side. Put a pillow between your legs to  further relax your lower back. If you sleep on your back, put pillows under your knees to support your legs in a slightly flexed position. Use a firm mattress. If your mattress sags, replace it, or use a 1/2-inch plywood board under the mattress to add support.  Follow-up care  Follow up with your healthcare provider, or as advised.  If X-rays, a CT scan or an MRI scan were taken, they may be reviewed by a radiologist. You will be told of any new findings that may affect your care.  Call 911  Call 911 if any of the following occur:  Trouble breathing  Confusion  Very drowsy  Fainting or loss of consciousness  Rapid or very slow heart rate  Loss of  bowel or bladder control  When to seek medical advice  Call your healthcare provider right away if any of the following occur:  Pain becomes worse or spreads to your arms or legs  Weakness or numbness in one or both arms or legs  Numbness in the groin area  Date Last Reviewed: 6/1/2016  © 7087-8211 [employer]. [address] [place] [address]. All rights reserved. This information is not intended as a substitute for professional medical care. Always follow your healthcare professional's instructions.        Exercises to Strengthen Your Lower Back  Strong lower back and abdominal muscles work together to support your spine. The exercises below will help strengthen the lower back. It is important that you begin exercising slowly and increase levels gradually.  Always begin any exercise program with stretching. If you feel pain while doing any of these exercises, stop and talk to your doctor about a more specific exercise program that better suits your condition.   Low back stretch  The point of stretching is to make you more flexible and increase your range of motion. Stretch only as much as you are able. Stretch slowly. Do not push your stretch to the limit. If at any point you feel pain while stretching, this is your (temporary) limit.  Lie on your  back with your knees bent and both feet on the ground.  Slowly raise your left knee to your chest as you flatten your lower back against the floor. Hold for 5 seconds.  Relax and repeat the exercise with your right knee.  Do 10 of these exercises for each leg.  Repeat hugging both knees to your chest at the same time.  Building lower back strength  Start your exercise routine with 10 to 30 minutes a day, 1 to 3 times a day.  Initial exercises  Lying on your back:  Ankle pumps: Move your foot up and down, towards your head, and then away. Repeat 10 times with each foot.  Heel slides: Slowly bend your knee, drawing the heel of your foot towards you. Then slide your heel/foot from you, straightening your knee. Do not lift your foot off the floor (this is not a leg lift).  Abdominal contraction: Bend your knees and put your hands on your stomach. Tighten your stomach muscles. Hold for 5 seconds, then relax. Repeat 10 times.  Straight leg raise: Bend one leg at the knee and keep the other leg straight. Tighten your stomach muscles. Slowly lift your straight leg 6 to 12 inches off the floor and hold for up to 5 seconds. Repeat 10 times on each side.  Standing:  Wall squats: Stand with your back against the wall. Move your feet about 12 inches away from the wall. Tighten your stomach muscles, and slowly bend your knees until they are at about a 45 degree angle. Do not go down too far. Hold about 5 seconds. Then slowly return to your starting position. Repeat 10 times.  Heel raises: Stand facing the wall. Slowly raise the heels of your feet up and down, while keeping your toes on the floor. If you have trouble balancing, you can touch the wall with your hands. Repeat 10 times.  More advanced exercises  When you feel comfortable enough, try these exercises.  Kneeling lumbar extension: Begin on your hands and knees. At the same time, raise and straighten your right arm and left leg until they are parallel to the ground. Hold  for 2 seconds and come back slowly to a starting position. Repeat with left arm and right leg, alternating 10 times.  Prone lumbar extension: Lie face down, arms extended overhead, palms on the floor. At the same time, raise your right arm and left leg as high as comfortably possible. Hold for 10 seconds and slowly return to start. Repeat with left arm and right leg, alternating 10 times. Gradually build up to 20 times. (Advanced: Repeat this exercise raising both arms and both legs a few inches off the floor at the same time. Hold for 5 seconds and release.)  Pelvic tilt: Lie on the floor on your back with your knees bent at 90 degrees. Your feet should be flat on the floor. Inhale, exhale, then slowly contract your abdominal muscles bringing your navel toward your spine. Let your pelvis rock back until your lower back is flat on the floor. Hold for 10 seconds while breathing smoothly.  Abdominal crunch: Perform a pelvic tilt (above) flattening your lower back against the floor. Holding the tension in your abdominal muscles, take another breath and raise your shoulder blades off the ground (this is not a full sit-up). Keep your head in line with your body (don’t bend your neck forward). Hold for 2 seconds, then slowly lower.  Date Last Reviewed: 6/1/2016 © 2000-2019 [employer]. [address] [place] [address], [place] [address]. All rights reserved. This information is not intended as a substitute for professional medical care. Always follow your healthcare professional's instructions.        Back Pain (Acute or Chronic)    Back pain is one of the most common problems. The good news is that most people feel better in 1 to 2 weeks, and most of the rest in 1 to 2 months. Most people can remain active.  People who have pain describe it differently--not everyone is the same.  The pain can be sharp, stabbing, shooting, aching, cramping or burning.  Movement, standing, bending, lifting, sitting, or walking may  worsen pain.  It can be localized to one spot or area, or it can be more generalized.  It can spread or radiate upwards, to the front, or go down your arms or legs (sciatica).  It can cause muscle spasm.  Most of the time, mechanical problems with the muscles or spine cause the pain. Mechanical problems are usually caused by an injury to the muscles or ligaments. While illness can cause back pain, it is usually not caused by a serious illness. Mechanical problems include:   Physical activity such as sports, exercise, work, or normal activity  Overexertion, lifting, pushing, pulling incorrectly or too aggressively  Sudden twisting, bending, or stretching from an accident, or accidental movement  Poor posture  Stretching or moving wrong, without noticing pain at the time  Poor coordination, lack of regular exercise (check with your doctor about this)  Spinal disc disease or arthritis  Stress  Pain can also be related to pregnancy, or illness like appendicitis, bladder or kidney infections, pelvic infections, and many other things.  Acute back pain usually gets better in 1 to 2 weeks. Back pain related to disk disease, arthritis in the spinal joints or spinal stenosis (narrowing of the spinal canal) can become chronic and last for months or years.  Unless you had a physical injury (for example, a car accident or fall) X-rays are usually not needed for the initial evaluation of back pain. If pain continues and does not respond to medical treatment, X-rays and other tests may be needed.  Home care  Try these home care recommendations:  When in bed, try to find a position of comfort. A firm mattress is best. Try lying flat on your back with pillows under your knees. You can also try lying on your side with your knees bent up towards your chest and a pillow between your knees.  At first, do not try to stretch out the sore spots. If there is a strain, it is not like the good soreness you get after exercising without an  injury. In this case, stretching may make it worse.  Don't sit for long periods, as in a long car ride or during other travel. This puts more stress on the lower back than standing or walking.  During the first 24 to 72 hours after an acute injury or flare up of chronic back pain, apply an ice pack to the painful area for 20 minutes and then remove it for 20 minutes. Do this over a period of 60 to 90 minutes or several times a day. This will reduce swelling and pain. Wrap the ice pack in a thin towel or plastic to protect your skin.  You can start with ice, then switch to heat. Heat (hot shower, hot bath, or heating pad) reduces pain and works well for muscle spasms. Heat can be applied to the painful area for 20 minutes then remove it for 20 minutes. Do this over a period of 60 to 90 minutes or several times a day. Do not sleep on a heating pad. It can lead to skin burns or tissue damage.  You can alternate ice and heat therapy. Talk with your doctor about the best treatment for your back pain.  Therapeutic massage can help relax the back muscles without stretching them.  Be aware of safe lifting methods and do not lift anything without stretching first.  Medicines  Talk to your doctor before using medicine, especially if you have other medical problems or are taking other medicines.  You may use over-the-counter medicine as directed on the bottle to control pain, unless another pain medicine was prescribed. If you have chronic conditions like diabetes, liver or kidney disease, stomach ulcers, or gastrointestinal bleeding, or are taking blood thinners, talk to your doctor before taking any medicine.  Be careful if you are given a prescription medicines, narcotics, or medicine for muscle spasms. They can cause drowsiness, affect your coordination, reflexes, and judgement. Do not drive or operate heavy machinery.  Follow-up care  Follow up with your healthcare provider, or as advised.   A radiologist will review any  X-rays that were taken. Your provide will notify you of any new findings that may affect your care.  Call 911  Call 911 if any of the following occur:  Trouble breathing  Confusion  Very drowsy or trouble awakening  Fainting or loss of consciousness  Rapid or very slow heart rate  Loss of bowel or bladder control  When to seek medical advice  Call your healthcare provider right away if any of these occur:   Pain becomes worse or spreads to your legs  Weakness or numbness in one or both legs  Numbness in the groin or genital area  Date Last Reviewed: 7/1/2016 © 2000-2019 [employer]. [address] [place] [id number]. All rights reserved. This information is not intended as a substitute for professional medical care. Always follow your healthcare professional's instructions.        Self-Care for Low Back Pain    Most people have low back pain now and then. In many cases, it isn’t serious and self-care can help. Sometimes low back pain can be a sign of a bigger problem. Call your healthcare provider if your pain returns often or gets worse over time. For the long-term care of your back, get regular exercise, lose any excess weight and learn good posture.  Take a short rest  Lying down during the day may be helpful for short periods of time if severe pain increases with sitting or standing. Long-term bed rest could be damaging.  Reduce pain and swelling  Cold reduces swelling. Both cold and heat can reduce pain. Protect your skin by placing a towel between your body and the ice or heat source.  For the first few days, apply an ice pack for 15 to 20 minutes, several times a day. To make a cold pack, put ice cubes in a plastic bag that seals at the top.  After the first few days, try heat for 15 minutes at a time to ease pain. Never sleep on a heating pad.  Over-the-counter medicine can help control pain and swelling. Try aspirin or a non-steroidal anti-inflammatory medicine (NSAID) such as  ibuprofen.  Exercise  Exercise can help your back heal. It also helps your back get stronger and more flexible, preventing any reinjury. Ask your healthcare provider about specific exercises for your back.  Use good posture to avoid reinjury  When moving, bend at the hips and knees. Don’t bend at the waist or twist around.  When lifting, keep the object close to your body. Lift heavy items using your legs, not your back. Don’t try to lift more than you can handle.  When sitting, keep your lower back supported. Use a rolled-up towel as needed.    Seek medical care right away if:  You can't stand or walk.  You have a temperature over 100.4°F (38.0°C)  You have frequent, painful, or bloody urination.  You have severe abdominal pain.  You have a sharp, stabbing pain.  Your pain is constant.  You have pain, tingling, or numbness in your leg.  You feel pain in a new area of your back.  You notice that the pain isn’t decreasing after more than a week.  Date Last Reviewed: 3/1/2018  © 1940-9700 [employer]. [address] [place] [address]. All rights reserved. This information is not intended as a substitute for professional medical care. Always follow your healthcare professional's instructions.        Relieving Back Pain  Back pain is a common problem. You can strain back muscles by lifting too much weight or just by moving the wrong way. Back strain can be uncomfortable, even painful. And it can take weeks or months to improve. To help yourself feel better and prevent future back strains, try these tips.  Important: Don't give aspirin to children or teens without first discussing it with your child's healthcare provider.  Ice    Ice reduces muscle pain and swelling. It helps most during the first 24 to 48 hours after an injury.  Wrap an ice pack or a bag of frozen peas in a thin towel. Never put ice directly on your skin.  Place the ice where your back hurts the most.  Don’t ice for more than 20  minutes at a time.  You can use ice several times a day.  Medicines  Over-the-counter pain relievers include acetaminophen and anti-inflammatory medicines, which includes aspirin, naproxen, or ibuprofen. They can help ease discomfort. Some also reduce swelling.  Tell your healthcare provider about any medicines you are already taking.  Take medicines only as directed.  Manipulation and massage  Having manipulation by an osteopathic doctor or chiropractor may be helpful. Getting a massage also may help.   Heat  After the first 48 hours, heat can relax sore muscles and improve blood flow.  Try a warm bath or shower. Or use a heating pad set on low. To prevent a burn, keep a cloth between you and the heating pad.  Don’t use a heating pad for more than 15 minutes at a time. Never sleep on a heating pad.  Date Last Reviewed: 6/1/2018  © 8761-7718 [employer]. [address] [place], [place] [id number]. All rights reserved. This information is not intended as a substitute for professional medical care. Always follow your healthcare professional's instructions.        Back Safety for Healthcare Workers     Bend at your knees and hips when bending down.   Whether you’re moving a patient, lifting a box of supplies, or pushing a cart or wheelchair, your back is always working. Use the tips below to help you reduce your risk of back injury.  Reaching  Reaching for records, files, or supplies, especially in high places, can strain your back:  Reach only as high as your shoulders.  Use a stool or stepladder if you need to get closer to the load.  Test the weight of the load by pushing up on a corner before lifting. If it’s too heavy, get help.  Bending and lifting  When you’re bending down to reach or lift, move your whole body to protect your back:  Bend your knees and hips, not your back. Don't bend or lift if you are off balance or if your back is twisted.  Kneel down on 1 knee, if necessary.  Get as close to the  object as you can, so you won’t have to reach with your arms.  Keep the load close to your body. “Hug” it unless there is a chance it may contain sharps.  Tighten your stomach muscles to support your back when you lift.  Lift with your legs, not your back.  Maintain a wide base of support. Keep feet shoulder-width apart, or one foot slightly in front of the other.  Pushing and pulling  Pulling larger objects can be as hard on your back as lifting. Whenever possible, push instead:  Push with both arms, keeping your elbows bent.  Stay close to the load, without leaning forward.  Tighten your stomach muscles as you push.  Date Last Reviewed: 3/1/2018  © 7838-3723 [employer]. [address] [place] [address], [place] [address]. All rights reserved. This information is not intended as a substitute for professional medical care. Always follow your healthcare professional's instructions.        Caring for Your Back Throughout the Day    Take care of your back throughout the day. You will likely have fewer back problems if you do. Try to warm up before you move. Shift positions often. Also do your best to form healthy habits.  Warm up for the day  Do a few slow, catlike stretches before starting your day. This simple warmup can soften your disks, stretch your back muscles, and help prevent injuries.  Shift positions often  At work and at home, change positions often. This helps keep your body from getting stiff. Stand up or lean back while you sit. If you can, get up and move every 1/2 hour.  Form healthy habits  Here are some suggestions:   Keep a healthy weight. When you weigh too much, your back is under excess strain. But losing just a few extra pounds can help a lot.  Try not to overeat. Learn about serving sizes. The size of a serving depends on the food and the food group. Many foods list serving sizes on the labels.  Handle minor aches with cold and heat. Apply cold the first 24 to 48 hours. Use heat after  that. Always place a thin cloth between your skin and the source of cold or heat.  Take medicines as directed. This helps keep pain under control. Always read labels, and call your healthcare provider or pharmacist if you have any questions.  Walk each day  A daily walk keeps your back and thigh muscles stretched and strong. This gives your back better support. Be sure to walk with your spine’s three curves aligned, by keeping your head, hips, and toes connected by a vertical line.  Date Last Reviewed: 5/1/2018 © 2000-2019 [employer]. [address] [place] [id number]. All rights reserved. This information is not intended as a substitute for professional medical care. Always follow your healthcare professional's instructions.        How Your Back Works  A healthy back lets you bend and stretch without pain. The spine has 3 natural curves. These keep your body balanced. Strong, flexible muscles support your spine. Soft, cushioning disks separate the hard bones of your spine. The disks let your spine bend and move.    The parts of the spine  The vertebrae are the 24 bones that make up the spine.  The spinous process is the part of each vertebra you can feel through your skin.  Each of these bones has a central hole that runs top to bottom. Together these holes form a tunnel called the spinal canal.  The lamina of each vertebra forms the back of the spinal canal.  Running through the canal are nerves. They are attached in a bundle called the spinal cord for most of the canal.  A foramen is a small opening where a spinal nerve root leaves the spinal canal.  Disks serve as cushions between vertebrae. A disk’s soft center absorbs shock during movement.     Two vertebrae and a disk     The supporting muscles  Strong, flexible muscles help maintain your 3 natural curves. They hold your spine in correct alignment. This helps support your upper body. Strong core muscles help take the strain off your  back. These include the stomach, buttock, and thigh muscles.  Date Last Reviewed: 1/1/2018  © 1362-8892 [employer]. [address] [place] [address]. All rights reserved. This information is not intended as a substitute for professional medical care. Always follow your healthcare professional's instructions.        Relieving Tension in Your Back  Being relaxed helps keep your mind healthy and your back ready to move. Take short breaks often. Walk around. Stretch. Switch tasks. Also give the following a try.  Make time to relax. Start by setting aside 5 minutes daily.  Deep breathing    Deep breathing is a simple way to reduce stress. You can do it almost any time you need to relax.  Inhale slowly through your nose. Let your lungs and stomach expand.  Hold your breath for 2 to 3 seconds.  Exhale slowly through your mouth until your lungs feel empty. Repeat 3 to 4 times.  Relieve tension  Muscle tension can create tender spots called trigger points. The tips below may help relieve muscle tension.  Press the trigger point if you can reach it. If not, lie on a soft tennis ball, or ask a friend to press the spot. Use steady pressure for 10 to 15 seconds. Breathe deeply. Repeat a few times.  Massage trigger points with ice for 2 to 5 minutes. Press lightly at first. Slowly increase firmness.  Date Last Reviewed: 5/1/2018  © 5256-4996 [employer]. [address] [place] [address]. All rights reserved. This information is not intended as a substitute for professional medical care. Always follow your healthcare professional's instructions.        Back Exercises: Lower Back Rotation    To start, lie on your back with your knees bent and feet flat on the floor. Don’t press your neck or lower back to the floor. Breathe deeply. You should feel comfortable and relaxed in this position.  Drop both knees to one side. Turn your head to the other side. Keep your shoulders flat on the  floor.  Do not push through pain.  Hold for 20 seconds.  Slowly switch sides.  Repeat 2 to 5 times.  Date Last Reviewed: 3/1/2018  © 8965-2290 [employer]. [address] [place] [address]. All rights reserved. This information is not intended as a substitute for professional medical care. Always follow your healthcare professional's instructions.        Back Exercises: Lower Back Stretch    To start, sit in a chair with your feet flat on the floor. Shift your weight slightly forward. Relax, and keep your ears, shoulders, and hips aligned while you do the following:  Sit with your feet well apart.  Bend forward and touch the floor with the backs of your hands. Relax and let your body drop.  Hold for 20 seconds. Return to starting position.  Repeat 2 times.   Date Last Reviewed: 11/1/2017 © 2000-2019 [employer]. [address] [place] [address]. All rights reserved. This information is not intended as a substitute for professional medical care. Always follow your healthcare professional's instructions.        Back Exercises: Seated Rotation    To start, sit in a chair with your feet flat on the floor. Shift your weight slightly forward to avoid rounding your back. Relax, and keep your ears, shoulders, and hips aligned:  Fold your arms and elbows just below shoulder height.  Turn from the waist with hips forward. Turn your head last. Do not push through the pain.  Hold for a count of 10 to 30. Return to starting position.  Repeat 3 to 5 times on one side. Then switch sides.  Date Last Reviewed: 3/1/2018  © 6796-1867 [employer]. [address] [place] [address]. All rights reserved. This information is not intended as a substitute for professional medical care. Always follow your healthcare professional's instructions.           Weight loss.  May be from diminished appetite.    Orders Placed This Encounter   Procedures    Lipid Panel    Hemoglobin  A1C    Thyroglobulin Antibody and Tumor Marker    URINALYSIS, AUTO, W/O SCOPE    INFLUENZA VAC HIGH DOSE PRSV FREE       Meds This Visit:  Requested Prescriptions     Signed Prescriptions Disp Refills    Ketorolac Tromethamine 10 MG Oral Tab 15 tablet 0     Sig: Take 1 tablet (10 mg total) by mouth every 8 (eight) hours.    cyclobenzaprine 5 MG Oral Tab 5 tablet 0     Sig: Take 1 tablet (5 mg total) by mouth nightly for 5 days.       Imaging & Referrals:  INFLUENZA VAC HIGH DOSE PRSV FREE  OPHTHALMOLOGY - EXTERNAL  XR LUMBAR SPINE (MIN 2 VIEWS) (CPT=72100)      Return to clinic after May 17, 2025 for physical.

## 2024-10-29 LAB
EST. AVERAGE GLUCOSE BLD GHB EST-MCNC: 148 MG/DL (ref 68–126)
HBA1C MFR BLD: 6.8 % (ref ?–5.7)

## 2024-10-31 LAB
THYROGLOB AB: <1 IU/ML
THYROGLOB IMA: 0.1 NG/ML

## 2024-11-06 ENCOUNTER — LAB ENCOUNTER (OUTPATIENT)
Dept: LAB | Facility: HOSPITAL | Age: 84
End: 2024-11-06
Attending: INTERNAL MEDICINE
Payer: MEDICARE

## 2024-11-06 ENCOUNTER — HOSPITAL ENCOUNTER (OUTPATIENT)
Dept: GENERAL RADIOLOGY | Facility: HOSPITAL | Age: 84
Discharge: HOME | End: 2024-11-06
Attending: INTERNAL MEDICINE
Payer: MEDICARE

## 2024-11-06 DIAGNOSIS — E55.9 VITAMIN D DEFICIENCY: ICD-10-CM

## 2024-11-06 DIAGNOSIS — M54.50 ACUTE MIDLINE LOW BACK PAIN WITHOUT SCIATICA: ICD-10-CM

## 2024-11-06 LAB — VIT D+METAB SERPL-MCNC: 72.7 NG/ML (ref 30–100)

## 2024-11-06 PROCEDURE — 80061 LIPID PANEL: CPT | Performed by: INTERNAL MEDICINE

## 2024-11-06 PROCEDURE — 36415 COLL VENOUS BLD VENIPUNCTURE: CPT | Performed by: INTERNAL MEDICINE

## 2024-11-06 PROCEDURE — 83036 HEMOGLOBIN GLYCOSYLATED A1C: CPT | Performed by: INTERNAL MEDICINE

## 2024-11-06 PROCEDURE — 82306 VITAMIN D 25 HYDROXY: CPT

## 2024-11-06 PROCEDURE — 72100 X-RAY EXAM L-S SPINE 2/3 VWS: CPT | Performed by: INTERNAL MEDICINE

## 2024-11-06 PROCEDURE — 80053 COMPREHEN METABOLIC PANEL: CPT | Performed by: INTERNAL MEDICINE

## 2024-11-15 ENCOUNTER — TELEPHONE (OUTPATIENT)
Dept: INTERNAL MEDICINE CLINIC | Facility: CLINIC | Age: 84
End: 2024-11-15

## 2024-12-28 ENCOUNTER — OFFICE VISIT (OUTPATIENT)
Dept: SLEEP CENTER | Age: 84
End: 2024-12-28
Attending: INTERNAL MEDICINE
Payer: MEDICARE

## 2024-12-28 DIAGNOSIS — R29.818 SUSPECTED SLEEP APNEA: Primary | ICD-10-CM

## 2024-12-28 PROCEDURE — 95810 POLYSOM 6/> YRS 4/> PARAM: CPT

## 2025-01-02 ENCOUNTER — ORDER TRANSCRIPTION (OUTPATIENT)
Dept: SLEEP CENTER | Age: 85
End: 2025-01-02

## 2025-01-02 DIAGNOSIS — G47.09 OTHER INSOMNIA: Primary | ICD-10-CM

## 2025-01-05 PROBLEM — G47.33 OBSTRUCTIVE SLEEP APNEA SYNDROME: Status: ACTIVE | Noted: 2025-01-05

## 2025-02-13 RX ORDER — FUROSEMIDE 20 MG/1
20 TABLET ORAL DAILY
Qty: 90 TABLET | Refills: 3 | Status: SHIPPED | OUTPATIENT
Start: 2025-02-13

## 2025-02-13 RX ORDER — CYCLOBENZAPRINE HCL 5 MG
5 TABLET ORAL NIGHTLY
Qty: 90 TABLET | Refills: 1 | Status: SHIPPED | OUTPATIENT
Start: 2025-02-13

## 2025-02-13 RX ORDER — GLIMEPIRIDE 1 MG/1
1 TABLET ORAL
Qty: 180 TABLET | Refills: 1 | Status: SHIPPED | OUTPATIENT
Start: 2025-02-13

## 2025-02-13 NOTE — TELEPHONE ENCOUNTER
Please review.  Protocol failed / Has no protocol.     Asking for refill to get to appointment date:  Future Appointments   Date Time Provider Department Center   5/12/2025 10:30 AM Ana Adair MD [id number]  [place] [facility]       Requested Prescriptions   Pending Prescriptions Disp Refills    GLIMEPIRIDE 1 MG Oral Tab [Pharmacy Med Name: GLIMEPIRIDE 1MG TABLETS] 180 tablet 1     Sig: TAKE 1 TABLET(1 MG) BY MOUTH TWICE DAILY BEFORE MEALS       Diabetes Medication Protocol Failed - 2/13/2025  3:33 PM        Failed - EGFRCR or GFRAA > 50     GFR Evaluation  EGFRCR: 46 , resulted on 11/6/2024          Passed - Last A1C < 7.5 and within past 6 months     Lab Results   Component Value Date    A1C 6.8 (H) 11/06/2024             Passed - In person appointment or virtual visit in the past 6 mos or appointment in next 3 mos     Recent Outpatient Visits              1 month ago Suspected sleep apnea    [facility]    Office Visit    3 months ago Acute systolic CHF (congestive heart failure), NYHA class 1 ([facility]    9 months ago Acute systolic CHF (congestive heart failure), NYHA class 1 ([facility])    [place] [place] [place]    Office Visit    1 year ago B12 deficiency    [facility] Ana Adair MD    Office Visit    1 year ago Insomnia, unspecified type    [facility] Alysia Esteves MD    Office Visit          Future Appointments         Provider Department Appt Notes    In 1 week [facility]     In 2 months Ana Adair MD [facility]                     Passed - Microalbumin procedure in past 12 months or taking ACE/ARB        Passed - GFR in the past 12 months        Passed - Medication is active  on med list          CYCLOBENZAPRINE 5 MG Oral Tab [Pharmacy Med Name: CYCLOBENZAPRINE 5MG TABLETS] 90 tablet      Sig: TAKE 1 TABLET(5 MG) BY MOUTH EVERY NIGHT       There is no refill protocol information for this order       FUROSEMIDE 20 MG Oral Tab [Pharmacy Med Name: FUROSEMIDE 20MG TABLETS] 90 tablet 1     Sig: TAKE 1 TABLET(20 MG) BY MOUTH DAILY       Hypertension Medications Protocol Failed - 2/13/2025  3:33 PM        Failed - EGFRCR or GFRAA > 50     GFR Evaluation  EGFRCR: 46 , resulted on 11/6/2024          Passed - CMP or BMP in past 12 months        Passed - Last BP reading less than 140/90     BP Readings from Last 1 Encounters:   10/28/24 107/60               Passed - In person appointment or virtual visit in the past 12 mos or appointment in next 3 mos     Recent Outpatient Visits              1 month ago Suspected sleep apnea    [facility]    Office Visit    3 months ago Acute systolic CHF (congestive heart failure), NYHA class 1 ([facility])    [facility] Ana Adair MD    Office Visit    9 months ago Acute systolic CHF (congestive heart failure), NYHA class 1 ([facility])    [facility] Ana Adair MD    Office Visit    1 year ago B12 deficiency    [facility] Ana Adair MD    Office Visit    1 year ago Insomnia, unspecified type    [facility] Alysia Esteves MD    Office Visit          Future Appointments         Provider Department Appt Notes    In 1 week [facility] [place]     In 2 months Ana Adair MD [facility]                     Passed - Medication is active on med list

## 2025-02-27 ENCOUNTER — OFFICE VISIT (OUTPATIENT)
Dept: SLEEP CENTER | Age: 85
End: 2025-02-27
Attending: INTERNAL MEDICINE
Payer: MEDICARE

## 2025-02-27 DIAGNOSIS — G47.33 OBSTRUCTIVE SLEEP APNEA SYNDROME: Primary | ICD-10-CM

## 2025-02-27 PROCEDURE — 95811 POLYSOM 6/>YRS CPAP 4/> PARM: CPT

## 2025-03-03 ENCOUNTER — TELEPHONE (OUTPATIENT)
Dept: INTERNAL MEDICINE CLINIC | Facility: CLINIC | Age: 85
End: 2025-03-03

## 2025-03-03 NOTE — TELEPHONE ENCOUNTER
----- Message from Ana Adair sent at 3/3/2025  2:29 PM CST -----  MPRESSIONS: This study demonstrates successful CPAP titration.  Diagnosis: Obstructive Sleep Apnea G47.33  RECOMMENDATIONS:  1. The patient should be prescribed CPAP at 16 cm H2O, with humidity at 3 and EPR on.  2. The patient was fitted with a [employer] SAMEERA mask, size LARGE.  3. The patient should avoid alcohol and sedative medications, as these may worsen severity of symptoms.  4. The patient should avoid drowsy driving.  5. Patient may follow up with the referring provider.  I would like to see your 4 to 6 weeks after she gets all the things set up in the home.  So I can see is actually working for her.

## 2025-03-04 NOTE — TELEPHONE ENCOUNTER
Order placed via parachute    CPAP Package    CPAP Machine, Generic -     PAP Mask, Fit to Comfort, 1 per 3 months. -     PAP Headgear, 1 per 6 months -     PAP Humidifier, Heated -     PAP Mask Interface Cushion, Fit to Comfort.    Disposable PAP Filter, 2 per 1 month -     Non-Disposable PAP Filter, 1 per 6 months -     PAP Machine Tubing, Heated, 1 per 3 months -     Humidifier Water Chamber, 1 per 6 months -     Quantity  1  Prescription Details  Fixed Pressure - 16 cm  Oxygen Usage - None  Supplier  [facility]

## 2025-03-10 NOTE — TELEPHONE ENCOUNTER
Ailyn from [facility] called stating they need the face-to- face to be signed. She stated they need a copy of the baseline sleep study. She stated she sent a response in [place] and never received a response.     Please fax this info to the following fax#:    Fax#: [phone]

## 2025-03-20 RX ORDER — FENOFIBRATE 134 MG/1
134 CAPSULE ORAL
Qty: 90 CAPSULE | Refills: 3 | Status: SHIPPED | OUTPATIENT
Start: 2025-03-20

## 2025-03-20 RX ORDER — ATORVASTATIN CALCIUM 40 MG/1
40 TABLET, FILM COATED ORAL NIGHTLY
Qty: 90 TABLET | Refills: 3 | Status: SHIPPED | OUTPATIENT
Start: 2025-03-20

## 2025-05-07 PROBLEM — Z85.850 HISTORY OF THYROID CANCER: Status: ACTIVE | Noted: 2025-05-07

## 2025-05-07 PROBLEM — C73 PAPILLARY THYROID CARCINOMA (HCC): Status: RESOLVED | Noted: 2022-03-05 | Resolved: 2025-05-07

## 2025-05-09 ENCOUNTER — MED REC SCAN ONLY (OUTPATIENT)
Dept: INTERNAL MEDICINE CLINIC | Facility: CLINIC | Age: 85
End: 2025-05-09

## 2025-05-12 ENCOUNTER — OFFICE VISIT (OUTPATIENT)
Dept: INTERNAL MEDICINE CLINIC | Facility: CLINIC | Age: 85
End: 2025-05-12

## 2025-05-12 VITALS
WEIGHT: 173.19 LBS | HEART RATE: 108 BPM | DIASTOLIC BLOOD PRESSURE: 79 MMHG | SYSTOLIC BLOOD PRESSURE: 136 MMHG | OXYGEN SATURATION: 100 % | TEMPERATURE: 97 F | HEIGHT: 64 IN | BODY MASS INDEX: 29.57 KG/M2

## 2025-05-12 DIAGNOSIS — M51.370 DEGENERATION OF INTERVERTEBRAL DISC OF LUMBOSACRAL REGION WITH DISCOGENIC BACK PAIN: ICD-10-CM

## 2025-05-12 DIAGNOSIS — Z71.85 VACCINE COUNSELING: ICD-10-CM

## 2025-05-12 DIAGNOSIS — I35.9 AORTIC VALVE DISORDER: ICD-10-CM

## 2025-05-12 DIAGNOSIS — I44.7 LEFT BUNDLE BRANCH BLOCK (LBBB): ICD-10-CM

## 2025-05-12 DIAGNOSIS — I27.20 PULMONARY HYPERTENSION (HCC): ICD-10-CM

## 2025-05-12 DIAGNOSIS — Z95.0 S/P BIVENTRICULAR CARDIAC PACEMAKER PROCEDURE: ICD-10-CM

## 2025-05-12 DIAGNOSIS — Z45.02 IMPLANTABLE DEFIBRILLATOR REPROGRAMMING/CHECK: ICD-10-CM

## 2025-05-12 DIAGNOSIS — E11.65 UNCONTROLLED TYPE 2 DIABETES MELLITUS WITH HYPERGLYCEMIA (HCC): ICD-10-CM

## 2025-05-12 DIAGNOSIS — Z85.850 HISTORY OF THYROID CANCER: ICD-10-CM

## 2025-05-12 DIAGNOSIS — E55.9 VITAMIN D DEFICIENCY: ICD-10-CM

## 2025-05-12 DIAGNOSIS — K59.01 SLOW TRANSIT CONSTIPATION: ICD-10-CM

## 2025-05-12 DIAGNOSIS — G47.00 INSOMNIA, UNSPECIFIED TYPE: ICD-10-CM

## 2025-05-12 DIAGNOSIS — C73 PAPILLARY THYROID CARCINOMA (HCC): ICD-10-CM

## 2025-05-12 DIAGNOSIS — E87.6 HYPOKALEMIA: ICD-10-CM

## 2025-05-12 DIAGNOSIS — E53.8 B12 DEFICIENCY: ICD-10-CM

## 2025-05-12 DIAGNOSIS — N18.31 STAGE 3A CHRONIC KIDNEY DISEASE (HCC): Chronic | ICD-10-CM

## 2025-05-12 DIAGNOSIS — I42.0 DILATED CARDIOMYOPATHY (HCC): ICD-10-CM

## 2025-05-12 DIAGNOSIS — E04.2 MULTIPLE THYROID NODULES: ICD-10-CM

## 2025-05-12 DIAGNOSIS — E78.2 MIXED HYPERLIPIDEMIA: ICD-10-CM

## 2025-05-12 DIAGNOSIS — Z00.00 ENCOUNTER FOR ANNUAL HEALTH EXAMINATION: ICD-10-CM

## 2025-05-12 DIAGNOSIS — R74.8 ELEVATED ALKALINE PHOSPHATASE MEASUREMENT: ICD-10-CM

## 2025-05-12 DIAGNOSIS — I34.0 NONRHEUMATIC MITRAL VALVE REGURGITATION: ICD-10-CM

## 2025-05-12 DIAGNOSIS — G43.009 MIGRAINE WITHOUT AURA AND WITHOUT STATUS MIGRAINOSUS, NOT INTRACTABLE: ICD-10-CM

## 2025-05-12 DIAGNOSIS — G47.09 OTHER INSOMNIA: ICD-10-CM

## 2025-05-12 DIAGNOSIS — R76.8 ANA POSITIVE: Primary | ICD-10-CM

## 2025-05-12 DIAGNOSIS — M10.00 IDIOPATHIC GOUT, UNSPECIFIED CHRONICITY, UNSPECIFIED SITE: ICD-10-CM

## 2025-05-12 DIAGNOSIS — I42.8 NONISCHEMIC CARDIOMYOPATHY (HCC): ICD-10-CM

## 2025-05-12 DIAGNOSIS — I10 ESSENTIAL HYPERTENSION: ICD-10-CM

## 2025-05-12 DIAGNOSIS — G47.33 OBSTRUCTIVE SLEEP APNEA SYNDROME: ICD-10-CM

## 2025-05-12 LAB
APPEARANCE: CLEAR
BASOPHILS # BLD AUTO: 0.05 X10(3) UL (ref 0–0.2)
BASOPHILS NFR BLD AUTO: 0.9 %
BILIRUBIN: NEGATIVE
CREAT UR-SCNC: 176.3 MG/DL
DEPRECATED RDW RBC AUTO: 53.3 FL (ref 35.1–46.3)
EOSINOPHIL # BLD AUTO: 0.19 X10(3) UL (ref 0–0.7)
EOSINOPHIL NFR BLD AUTO: 3.2 %
ERYTHROCYTE [DISTWIDTH] IN BLOOD BY AUTOMATED COUNT: 15.2 % (ref 11–15)
GLUCOSE (URINE DIPSTICK): NEGATIVE MG/DL
HCT VFR BLD AUTO: 41.3 % (ref 35–48)
HGB BLD-MCNC: 12.6 G/DL (ref 12–16)
IMM GRANULOCYTES # BLD AUTO: 0.01 X10(3) UL (ref 0–1)
IMM GRANULOCYTES NFR BLD: 0.2 %
KETONES (URINE DIPSTICK): NEGATIVE MG/DL
LEUKOCYTES: NEGATIVE
LYMPHOCYTES # BLD AUTO: 1.41 X10(3) UL (ref 1–4)
LYMPHOCYTES NFR BLD AUTO: 24.1 %
MCH RBC QN AUTO: 29.1 PG (ref 26–34)
MCHC RBC AUTO-ENTMCNC: 30.5 G/DL (ref 31–37)
MCV RBC AUTO: 95.4 FL (ref 80–100)
MICROALBUMIN UR-MCNC: <0.3 MG/DL
MONOCYTES # BLD AUTO: 0.43 X10(3) UL (ref 0.1–1)
MONOCYTES NFR BLD AUTO: 7.4 %
MULTISTIX LOT#: NORMAL NUMERIC
NEUTROPHILS # BLD AUTO: 3.76 X10 (3) UL (ref 1.5–7.7)
NEUTROPHILS # BLD AUTO: 3.76 X10(3) UL (ref 1.5–7.7)
NEUTROPHILS NFR BLD AUTO: 64.2 %
NITRITE, URINE: NEGATIVE
OCCULT BLOOD: NEGATIVE
PH, URINE: 5.5 (ref 4.5–8)
PLATELET # BLD AUTO: 202 10(3)UL (ref 150–450)
PROTEIN (URINE DIPSTICK): NEGATIVE MG/DL
RBC # BLD AUTO: 4.33 X10(6)UL (ref 3.8–5.3)
SPECIFIC GRAVITY: 1.03 (ref 1–1.03)
T4 FREE SERPL-MCNC: 1.2 NG/DL (ref 0.8–1.7)
TSI SER-ACNC: 3.09 UIU/ML (ref 0.55–4.78)
URATE SERPL-MCNC: 4.8 MG/DL (ref 3.1–7.8)
URINE-COLOR: YELLOW
UROBILINOGEN,SEMI-QN: 1 MG/DL (ref 0–1.9)
VIT B12 SERPL-MCNC: 321 PG/ML (ref 211–911)
VIT D+METAB SERPL-MCNC: 56.4 NG/ML (ref 30–100)
WBC # BLD AUTO: 5.9 X10(3) UL (ref 4–11)

## 2025-05-12 RX ORDER — PREGABALIN 75 MG/1
75 CAPSULE ORAL 2 TIMES DAILY
Qty: 30 CAPSULE | Refills: 1 | Status: SHIPPED | OUTPATIENT
Start: 2025-05-12

## 2025-05-12 RX ORDER — OMEPRAZOLE 40 MG/1
40 CAPSULE, DELAYED RELEASE ORAL DAILY
COMMUNITY
Start: 2025-04-29

## 2025-05-12 NOTE — PATIENT INSTRUCTIONS
ASSESSMENT/PLAN:     Encounter Diagnoses   Name Primary?    GARY positive Not SLE.    Yes    Aortic valve disorder Stable FU cards.        B12 deficiency Check blood.        Stage 3a chronic kidney disease (HCC) No motrin, ibuprofen, advil, alleve, naprosyn  with these medications.  Hydrate at least 48 oz. water a day.        Degeneration of intervertebral disc of lumbosacral region with discogenic back pain Stable.        Dilated cardiomyopathy (HCC) Euvolemic.        Elevated alkaline phosphatase measurement Check blood.        Essential hypertension Higher. Careful with diet and excercise at least 30 minutes 3-4 times a week. Check blood pressures at different times on different days. Can purchase own blood pressure monitor. If not, check at local pharmacy. Bake foods more and grill occasionally. Avoid fried foods. No salt. Use other seasonings.         Idiopathic gout, unspecified chronicity, unspecified site Check blood.        History of thyroid cancer Check blood. Check US.        Hypokalemia Check blood.        Implantable defibrillator reprogramming/check     Left bundle branch block (LBBB) Stable.        Migraine without aura and without status migrainosus, not intractable Improved.        Mixed hyperlipidemia Check blood.        Multiple thyroid nodules Check US and blood.        Nonischemic cardiomyopathy (HCC) Stable.        Nonrheumatic mitral valve regurgitation FU cards.        Obstructive sleep apnea syndrome Never got supplies. Stick to a sleep schedule. Keep your bedtime and wake time consistent from day to day, including on weekends.   Stay active. Regular activity helps promote a good night's sleep. Schedule exercise at least a few hours before bedtime and avoid stimulating activities before bedtime.   Check your medications. If you take medications regularly, check with your doctor to see if they may be contributing to your insomnia. Also check the labels of OTC products to see if they contain  caffeine or other stimulants, such as pseudoephedrine.   Avoid or limit naps. Naps can make it harder to fall asleep at night. If you can't get by without one, try to limit a nap to no more than 30 minutes and don't nap after 3 p.m.   Avoid or limit caffeine and alcohol and don't use nicotine. All of these can make it harder to sleep, and effects can last for several hours.   Avoid large meals and beverages before bed. A light snack is fine and may help avoid heartburn. Drink less liquid before bedtime so that you won't have to urinate as often.  At bedtime:  Try melatonin 10 mg 30 minutes before bed.  Make your bedroom comfortable for sleep. Only use your bedroom for sex or sleep. Keep it dark and quiet, at a comfortable temperature. Hide all clocks in your bedroom, including your wristwatch and cellphone, so you don't worry about what time it is.   Find ways to relax. Try to put your worries and planning aside when you get into bed. A warm bath or a massage before bedtime can help prepare you for sleep. Create a relaxing bedtime ritual, such as taking a hot bath, reading, soft music, breathing exercises, yoga or prayer.   Avoid trying too hard to sleep. The harder you try, the more awake you'll become. Read in another room until you become very drowsy, then go to bed to sleep. Don't go to bed too early, before you're sleepy.   Get out of bed when you're not sleeping. Sleep as much as you need to feel rested, and then get out of bed. Don't stay in bed if you're not sleeping.      Other insomnia DW pt. of options. Try lyrica 75 mg at night. Discussed with patient of side effects and use of these medications.         Pulmonary hypertension (HCC) FU cards. FU respiratory supplier.        S/P biventricular cardiac pacemaker procedure       Slow transit constipation Stable. Add Benefiber 1 tablespoon a day and MiraLAX if no bowel movement after 2 -3  days.        Uncontrolled type 2 diabetes mellitus with hyperglycemia  (HCC) ? Control. Check urine and blood. Careful with diet and excercise at least 30 minutes 3-4 times a week. Check sugars at different times on different dates. Careful with low sugars. Carry something with you and check sugar if can. Can carry christel cracker, etc. Decrease carbohydrates. But also, careful with fruits and natural sugars.One serving a day and no more than 1 handful every day. Check feet  every AM and careful with sores and ulcers on feet bilaterally. Check eyes every year with dilated eye exam.  Check sugars.  2-hour postmeal should be less than 140s.  Pre-meal should be 's.  Both equally affected A1c.  Discussed importance of glycemic control to prevent complications of diabetes  -Discussed complications of diabetes include retinopathy, neuropathy, nephropathy and cardiovascular disease  -Discussed ABCs of DM  -Discussed importance of SBGM  -Discussed importance of low CHO diet, recommend 45gm per meal or 135gm per day  -abNormal foot exam. Wear shoes or sandals in the house at all times.  Good comfortable shoes or sandals that have a strap so stay on the feet.  Check feet every morning watch for signs and symptoms of infection i.e. sores drainage redness pain etc.  If lotion feet, lotion on the top and bottom and not between the toes.   -Follow up with optho       Vitamin D deficiency Check blood.        Vaccine counseling Holding shingrix.  Recommend shingles vaccine.  There is 2 doses of the vaccine  by 2 months 6 months apart.  Check on insurance coverage on shingles vaccine.  May be covered better at the pharmacy.  Separate shingles vaccine from all of the vaccines by at least 1 to 2 weeks.  Discussed about side effects of vaccine. Recc. Tdap. Check on insurance coverage.        Encounter for annual health examination Check urine.         Orders Placed This Encounter   Procedures    Vitamin B12    TSH W Reflex To Free T4    Free T4, (Free Thyroxine)    Microalb/Creat Ratio,  Random Urine    CBC With Differential With Platelet    Uric Acid    URINALYSIS, AUTO, W/O SCOPE       Meds This Visit:  Requested Prescriptions     Signed Prescriptions Disp Refills    pregabalin 75 MG Oral Cap 30 capsule 1     Sig: Take 1 capsule (75 mg total) by mouth 2 (two) times daily.       Imaging & Referrals:  DME - EXTERNAL       RTC 3 months extended.

## 2025-05-12 NOTE — PROGRESS NOTES
HPI:    Patient ID: Rozina Haynes is a 84 year old female.    Rozina Haynes is a 84 year old female who presents for a complete physical exam.   Rozina Haynes is a 84 year old female who presents for a Medicare Assessment.     Chief Complaint   Patient presents with    Wellness Visit     Patient states she is here for an Medicare Wellness Visit           Patient here for follow up of Diabetes.  Has been taking medications regularly.    Checks sugars 0 times daily.  Fasting sugars average not check. No Sx. of hypo nor hyperglycemia.  Watches diabetic diet, low salt.  Diabetic Flow sheet      5/12/2025    12:13 PM 5/12/2025    11:14 AM 5/12/2025    11:10 AM 5/12/2025    11:07 AM   DIABETIC FLOWSHEET ([facility])   BMI    29.73 kg/m2   Valsartan 1 po qam and 1/2 tab qpm. (160 MG TABS) 1 po qam and 1/2 tab qpm. (160 MG TABS)  1 po qam and 1/2 tab qpm. (160 MG TABS)  1 po qam and 1/2 tab qpm. (160 MG TABS)    Weight (enc vitals)    173 lb 3.2 oz   BP (enc vitals)   136/79 141/82   PHQ2 Score  0        Last eye exam [facility] due.     Hypertension  Patient is here for follow up of hypertension. BP at home: not check. Occ. Checks in 1 week. 130/70's.   Has been compliant with medications.  Exercise level: somewhat active (walks thru house X 3)and has not been following low salt diet.  Weight has been stable. More  eat out.   Wt Readings from Last 3 Encounters:   05/12/25 173 lb 3.2 oz (78.6 kg)   10/28/24 169 lb 12.8 oz (77 kg)   05/17/24 176 lb (79.8 kg)     BP Readings from Last 3 Encounters:   05/12/25 136/79   10/28/24 107/60   05/17/24 144/70     Labs:   Lab Results   Component Value Date/Time     (H) 11/06/2024 02:31 PM     11/06/2024 02:31 PM    K 3.9 11/06/2024 02:31 PM     11/06/2024 02:31 PM    CO2 21.0 11/06/2024 02:31 PM    CREATSERUM 1.18 (H) 11/06/2024 02:31 PM    CA 10.1 11/06/2024 02:31 PM    AST 18 11/06/2024 02:31 PM    ALT 16 11/06/2024 02:31 PM    TSH 3.050 02/06/2024 02:38 PM    T4F 1.2  02/06/2024 02:38 PM        Lab Results   Component Value Date/Time    CHOLEST 145 11/06/2024 02:31 PM    HDL 44 11/06/2024 02:31 PM    TRIG 195 (H) 11/06/2024 02:31 PM    LDL 69 11/06/2024 02:31 PM    NONHDLC 101 11/06/2024 02:31 PM          Labs:   Lab Results   Component Value Date/Time    WBC 9.6 12/23/2023 12:31 PM    HGB 13.3 12/23/2023 12:31 PM    .0 12/23/2023 12:31 PM      Lab Results   Component Value Date/Time     (H) 11/06/2024 02:31 PM     11/06/2024 02:31 PM    K 3.9 11/06/2024 02:31 PM     11/06/2024 02:31 PM    CO2 21.0 11/06/2024 02:31 PM    CREATSERUM 1.18 (H) 11/06/2024 02:31 PM    CA 10.1 11/06/2024 02:31 PM    ALB 4.5 11/06/2024 02:31 PM    TP 7.5 11/06/2024 02:31 PM    ALKPHO 141 11/06/2024 02:31 PM    AST 18 11/06/2024 02:31 PM    ALT 16 11/06/2024 02:31 PM    BILT 1.0 11/06/2024 02:31 PM    TSH 3.050 02/06/2024 02:38 PM    T4F 1.2 02/06/2024 02:38 PM        Lab Results   Component Value Date/Time    CHOLEST 145 11/06/2024 02:31 PM    HDL 44 11/06/2024 02:31 PM    TRIG 195 (H) 11/06/2024 02:31 PM    LDL 69 11/06/2024 02:31 PM    NONHDLC 101 11/06/2024 02:31 PM       Lab Results   Component Value Date/Time    A1C 6.8 (H) 11/06/2024 02:31 PM      Lab Results   Component Value Date    VITD 72.7 11/06/2024         No recommendations at this time    Allergies:  Allergies   Allergen Reactions    Erythromycin HIVES     Current Outpatient Medications   Medication Sig Dispense Refill    pregabalin 75 MG Oral Cap Take 1 capsule (75 mg total) by mouth 2 (two) times daily. 30 capsule 1    fenofibrate micronized 134 MG Oral Cap Take 1 capsule (134 mg total) by mouth daily with breakfast. 90 capsule 3    atorvastatin 40 MG Oral Tab Take 1 tablet (40 mg total) by mouth nightly. 90 tablet 3    glimepiride 1 MG Oral Tab Take 1 tablet (1 mg total) by mouth 2 (two) times daily before meals. 180 tablet 1    cyclobenzaprine 5 MG Oral Tab Take 1 tablet (5 mg total) by mouth nightly. 90  tablet 1    furosemide 20 MG Oral Tab Take 1 tablet (20 mg total) by mouth daily. 90 tablet 3    Cholecalciferol (VITAMIN D3) 1.25 MG (01735 UT) Oral Cap Take 1 capsule by mouth every 30 (thirty) days. 3 capsule 1    Ketorolac Tromethamine 10 MG Oral Tab Take 1 tablet (10 mg total) by mouth every 8 (eight) hours. 15 tablet 0    pantoprazole 40 MG Oral Tab EC TAKE 1 TABLET BY MOUTH EVERY MORNING BEFORE BREAKFAST 90 tablet 3    montelukast 10 MG Oral Tab Take 1 tablet (10 mg total) by mouth nightly. 30 tablet 3    levothyroxine 150 MCG Oral Tab Take 1 tablet (150 mcg total) by mouth before breakfast. 90 tablet 3    tiZANidine 4 MG Oral Tab Take 1 tablet (4 mg total) by mouth nightly. 30 tablet 1    allopurinol 300 MG Oral Tab Take 1 tablet (300 mg total) by mouth daily.      metoprolol tartrate 100 MG Oral Tab Take 1 tablet (100 mg total) by mouth daily.      potassium chloride 20 MEQ Oral Tab CR Take 2 tablets (40 mEq total) by mouth 2 (two) times daily. 360 tablet 3    valsartan 160 MG Oral Tab 1 po qam and 1/2 tab qpm. 180 tablet 0    metoprolol succinate  MG Oral Tablet 24 Hr Take 1 tablet (100 mg total) by mouth daily.      triamcinolone 0.1 % External Cream Apply 1 Application topically 2 (two) times daily. 45 g 0    Vitamin B12 100 MCG Oral Tab Take 1 tablet (100 mcg total) by mouth daily.      Cholecalciferol 50 MCG (2000 UT) Oral Cap Take 2,000 Units by mouth daily.        aspirin 81 MG Oral Tab Take 1 tablet (81 mg total) by mouth daily.        Past Medical History:    Anxiety state, unspecified    Degeneration of lumbar or lumbosacral intervertebral disc    Gout    IBS (irritable bowel syndrome)    Left bundle branch block (LBBB) on electrocardiogram    AbNL regaadenosine. CTA shows mild soft plaque RCA calcium score zero.     Migraine    Obstructive sleep apnea syndrome    IMPRESSIONS: This study demonstrates severe obstructive sleep apnea. Severe sleep architecture fragmentation was identified.    Diagnosis: Obstructive Sleep Apnea G47.33   RECOMMENDATIONS:   1. It is recommended to proceed with CPAP titration.   2. The patient should avoid alcohol and sedative medications, as these may worsen severity of symptoms.   3. The patient should avoid drowsy driving.   4.*    Papillary thyroid carcinoma (HCC)    TIA (transient ischemic attack)      Past Surgical History:   Procedure Laterality Date    Cholecystectomy      Colonoscopy      Other surgical history      LASER SURGERY ON GALL BLADDER STONES    Thyroidectomy  8/31/15    Total thyroidectomy. 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     Upper gi endoscopy,exam        Family History   Problem Relation Age of Onset    Hypertension Mother     Heart Disease Mother 40        CAD S/P MI.     Cancer Brother         Lung cancer. Construction.       Social History:  Social History     Socioeconomic History    Marital status:    Tobacco Use    Smoking status: Never    Smokeless tobacco: Never   Vaping Use    Vaping status: Never Used   Substance and Sexual Activity    Alcohol use: Yes     Comment: occ    Drug use: No   Other Topics Concern     Service No    Blood Transfusions No    Caffeine Concern Yes     Comment: sodas 3-4 cans daily    Occupational Exposure No    Hobby Hazards No    Sleep Concern Yes     Comment: has anxiety , does not sleep well.    Stress Concern No    Weight Concern Yes     Comment: lost 20 lb in in 3-4 month    Special Diet No    Back Care No    Exercise Yes     Comment: cardio    Bike Helmet No    Seat Belt Yes    Self-Exams Yes       History/Other:     Patient Active Problem List   Diagnosis    Migraine    Elevated alkaline phosphatase measurement    Mixed hyperlipidemia    B12 deficiency    Degeneration of lumbar or lumbosacral intervertebral disc    Aortic valve disorder    Essential hypertension    Multiple thyroid nodules    Vitamin D deficiency    Vaccine counseling    GARY positive    Hypokalemia    Nonischemic  cardiomyopathy (HCC)    Pulmonary hypertension (HCC)    Dilated cardiomyopathy (HCC)    Nonrheumatic mitral valve regurgitation    Other insomnia    Slow transit constipation    Gout    S/P biventricular cardiac pacemaker procedure    CKD (chronic kidney disease) stage 3, GFR 30-59 ml/min (HCC)    Implantable defibrillator reprogramming/check    Uncontrolled type 2 diabetes mellitus with hyperglycemia (HCC)    Left bundle branch block (LBBB)    Obstructive sleep apnea syndrome    History of thyroid cancer       GYNE HISTORY:  OB History   No obstetric history on file.        No LMP recorded. Patient is postmenopausal.       Tobacco:  She has never smoked tobacco.    CAGE Alcohol Screen:   CAGE screening score of 0 on 5/12/2025, showing low risk of alcohol abuse.        Patient Care Team:  Ana Adair MD as PCP - General (Internal Medicine)  Nazanin Yin MD (NEUROLOGY)  Doc Jimenez MD as Consulting Physician (Cardiac Electrophysiology)  Ramírez Cisse MD as Consulting Physician (OTOLARYNGOLOGY)  Jamil Lester MD as Consulting Physician (Interventional, Cardiology)  snoring ( Y )   nocturnal awakenings ( Y)   excessive daytime somnolence (  Y)    difficulty awakening (  Y)   witnessed apneic events   ( N)   unrefreshing sleep ( Y )   Snoring Y  Apnea N  Nocturia 1-2  Dry mouth N  Headaches N  Excessive daytime sleepiness Y  Fatigue N  Sleepiness/drowsiness while driving N  Insomnia Y  Sleep schedule: Time goes to bed: 9 PM.                              Sleep latency: 2-3 hrs.                              Awakenings: 2-3 , occ. watches TV.                              Wake time:.  830 AM.                             Naps:N            Review of Systems   Constitutional:  Negative for activity change, appetite change, chills, diaphoresis, fatigue, fever and unexpected weight change.   HENT:  Negative for congestion, dental problem, drooling, ear discharge, ear pain, facial swelling, hearing loss, mouth sores,  nosebleeds, postnasal drip, rhinorrhea, sinus pressure, sinus pain, sneezing, sore throat, tinnitus, trouble swallowing and voice change.    Eyes:  Negative for photophobia, pain, discharge, redness, itching and visual disturbance.   Respiratory:  Negative for apnea, cough, choking, chest tightness, shortness of breath, wheezing and stridor.    Cardiovascular:  Negative for chest pain, palpitations and leg swelling.   Gastrointestinal:  Negative for abdominal distention, abdominal pain, anal bleeding, blood in stool, constipation, diarrhea, nausea, rectal pain and vomiting.   Endocrine: Negative for cold intolerance, heat intolerance, polydipsia, polyphagia and polyuria.   Genitourinary:  Negative for decreased urine volume, difficulty urinating, dysuria, flank pain, frequency, hematuria, menstrual problem, pelvic pain, urgency, vaginal bleeding, vaginal discharge and vaginal pain.   Skin:  Negative for rash.   Neurological:  Negative for dizziness, tremors, seizures, syncope, facial asymmetry, speech difficulty, weakness, light-headedness, numbness and headaches.   Psychiatric/Behavioral:  Negative for agitation, behavioral problems, confusion, decreased concentration, dysphoric mood, hallucinations, self-injury, sleep disturbance and suicidal ideas. The patient is not nervous/anxious and is not hyperactive.    All other systems reviewed and are negative.          Functional Ability/Status:   Rozina Haynes has some abnormal functions as listed below:  She has Dressing and/or Bathing issues based on screening of functional status.  Difficulty dressing or bathing?: Yes  Bathing or Showering: Able without help  Dressing: Able without help  She has Walking problems based on screening of functional status. She has problems with Daily Activities based on screening of functional status. She has problems with Memory based on screening of functional status.         Fall Risk Assessment:   She has been screened for Falls and  is High Risk. Fall Prevention information provided to patient in After Visit Summary.    Do you feel unsteady when standing or walking?: Yes  Do you worry about falling?: No  Have you fallen in the past year?: No       Medicare Hearing Assessment:   Hearing Screening    Time taken: 5/12/2025 11:09 AM  Screening Method: Finger Rub  Finger Rub Result: Pass         Depression Screening (PHQ-2/PHQ-9): PHQ-2 SCORE: 0  , done 5/12/2025          Cognitive Assessment:   She had a completely normal cognitive assessment - see flowsheet entries       Supplementary Documentation:     In the past six months, have you lost more than 10 pounds without trying?: 2 - No  Has your appetite been poor?: No  Type of Diet: Low Salt  How does the patient maintain a good energy level?: Daily Walks  How would you describe your daily physical activity?: Light  How would you describe your current health state?: Fair  How do you maintain positive mental well-being?: Puzzles, Visiting Family  On a scale of 0 to 10, with 0 being no pain and 10 being severe pain, what is your pain level?: 5 - (Moderate)  In the past six months, have you experienced urine leakage?: 0-No  At any time do you feel concerned for the safety/well-being of yourself and/or your children, in your home or elsewhere?: No  Have you had any immunizations at another office such as Influenza, Hepatitis B, Tetanus, or Pneumococcal?: No    Visual Acuity:   Right Eye Visual Acuity: Corrected Right Eye Chart Acuity: 20/40   Left Eye Visual Acuity: Corrected Left Eye Chart Acuity: 20/40   Both Eyes Visual Acuity: Corrected Both Eyes Chart Acuity: 20/40   Able To Tolerate Visual Acuity: Yes        PHYSICAL EXAM:   /79 (BP Location: Right arm, Patient Position: Sitting, Cuff Size: large)   Pulse 108   Temp 97.3 °F (36.3 °C) (Temporal)   Ht 5' 4\" (1.626 m)   Wt 173 lb 3.2 oz (78.6 kg)   SpO2 100%   BMI 29.73 kg/m²   BP Readings from Last 3 Encounters:   05/12/25 136/79    10/28/24 107/60   05/17/24 144/70     Wt Readings from Last 3 Encounters:   05/12/25 173 lb 3.2 oz (78.6 kg)   10/28/24 169 lb 12.8 oz (77 kg)   05/17/24 176 lb (79.8 kg)      Body mass index is 29.73 kg/m².   Physical Exam  Vitals and nursing note reviewed.   Constitutional:       General: She is not in acute distress.     Appearance: Normal appearance. She is well-developed and well-groomed. She is not ill-appearing, toxic-appearing or diaphoretic.      Interventions: She is not intubated.  HENT:      Head: Normocephalic and atraumatic.      Right Ear: Tympanic membrane, ear canal and external ear normal. No decreased hearing noted. No laceration, drainage, swelling or tenderness. No middle ear effusion. There is no impacted cerumen. No foreign body. No mastoid tenderness. No PE tube. No hemotympanum. Tympanic membrane is not injected, scarred, perforated, erythematous, retracted or bulging. Tympanic membrane has normal mobility.      Left Ear: Tympanic membrane, ear canal and external ear normal. No decreased hearing noted. No laceration, drainage, swelling or tenderness.  No middle ear effusion. There is no impacted cerumen. No foreign body. No mastoid tenderness. No PE tube. No hemotympanum. Tympanic membrane is not injected, scarred, perforated, erythematous, retracted or bulging. Tympanic membrane has normal mobility.      Nose:      Right Sinus: No maxillary sinus tenderness or frontal sinus tenderness.      Left Sinus: No maxillary sinus tenderness or frontal sinus tenderness.      Mouth/Throat:      Lips: Pink. No lesions.      Mouth: Mucous membranes are moist. No injury, lacerations, oral lesions or angioedema.      Dentition: No dental tenderness, gingival swelling, dental abscesses or gum lesions.      Tongue: No lesions. Tongue does not deviate from midline.      Palate: No mass and lesions.      Pharynx: Oropharynx is clear. Uvula midline. No pharyngeal swelling, oropharyngeal exudate, posterior  oropharyngeal erythema or uvula swelling.      Tonsils: No tonsillar exudate or tonsillar abscesses.   Eyes:      General: Lids are normal. Gaze aligned appropriately. No scleral icterus.        Right eye: No foreign body, discharge or hordeolum.         Left eye: No foreign body, discharge or hordeolum.      Extraocular Movements: Extraocular movements intact.      Right eye: Normal extraocular motion and no nystagmus.      Left eye: Normal extraocular motion and no nystagmus.      Conjunctiva/sclera: Conjunctivae normal.      Right eye: Right conjunctiva is not injected. No chemosis, exudate or hemorrhage.     Left eye: Left conjunctiva is not injected. No chemosis, exudate or hemorrhage.     Pupils: Pupils are equal, round, and reactive to light.   Neck:      Thyroid: No thyroid mass, thyromegaly or thyroid tenderness.      Vascular: Normal carotid pulses. No carotid bruit, hepatojugular reflux or JVD.      Trachea: Trachea and phonation normal. No tracheal tenderness, tracheostomy, abnormal tracheal secretions or tracheal deviation.   Cardiovascular:      Rate and Rhythm: Normal rate and regular rhythm.      Pulses: Normal pulses.           Carotid pulses are 2+ on the right side and 2+ on the left side.       Radial pulses are 2+ on the right side and 2+ on the left side.        Dorsalis pedis pulses are 2+ on the right side and 2+ on the left side.        Posterior tibial pulses are 2+ on the right side and 2+ on the left side.      Heart sounds: Normal heart sounds, S1 normal and S2 normal.   Pulmonary:      Effort: Pulmonary effort is normal. No tachypnea, bradypnea, accessory muscle usage, prolonged expiration, respiratory distress or retractions. She is not intubated.      Breath sounds: Normal breath sounds and air entry. No stridor, decreased air movement or transmitted upper airway sounds. No decreased breath sounds, wheezing, rhonchi or rales.   Chest:      Chest wall: No tenderness.   Breasts:      Breasts are symmetrical.      Right: No swelling, bleeding, inverted nipple, mass, nipple discharge, skin change or tenderness.      Left: No swelling, bleeding, inverted nipple, mass, nipple discharge, skin change or tenderness.   Abdominal:      General: Bowel sounds are normal. There is no distension.      Palpations: Abdomen is soft. Abdomen is not rigid. There is no fluid wave, hepatomegaly, splenomegaly or mass.      Tenderness: There is no abdominal tenderness. There is no right CVA tenderness, left CVA tenderness, guarding or rebound.   Genitourinary:     Exam position: Supine.   Musculoskeletal:      Cervical back: Neck supple. No tenderness.      Right lower leg: No edema.      Left lower leg: No edema.   Lymphadenopathy:      Head:      Right side of head: No submental, submandibular, preauricular, posterior auricular or occipital adenopathy.      Left side of head: No submental, submandibular, preauricular, posterior auricular or occipital adenopathy.      Cervical: No cervical adenopathy.      Right cervical: No superficial, deep or posterior cervical adenopathy.     Left cervical: No superficial, deep or posterior cervical adenopathy.      Upper Body:      Right upper body: No supraclavicular, axillary or pectoral adenopathy.      Left upper body: No supraclavicular, axillary or pectoral adenopathy.   Skin:     General: Skin is warm and dry.      Coloration: Skin is not pale.      Findings: No erythema or rash.   Neurological:      Mental Status: She is alert and oriented to person, place, and time.   Psychiatric:         Mood and Affect: Mood normal.         Speech: Speech normal.         Behavior: Behavior normal. Behavior is cooperative.     Bilateral barefoot skin diabetic exam is normal, visualized feet and the appearance is normal.  Except bilateral big toes with nail changes kind of elevated from the nailbed due to trauma.  2nd, 3rd and 4th L toes with thickened blackened toenails from old  trauma.  Bilateral monofilament/sensation of both feet is abnormal.  Pulsation pedal pulse exam of both lower legs/feet is normal as well.            ASSESSMENT/PLAN:     Encounter Diagnoses   Name Primary?    GARY positive Not SLE.    Yes    Aortic valve disorder Stable. Fu cards.        B12 deficiency Check blood.        Stage 3a chronic kidney disease (HCC) No motrin, ibuprofen, advil, alleve, naprosyn  with these medications.  Hydrate at least 48 ounces of water a day.       Degeneration of intervertebral disc of lumbosacral region with discogenic back pain Stable.        Dilated cardiomyopathy (HCC) Euvolemic.        Elevated alkaline phosphatase measurement Check blood.        Essential hypertension Higher. Careful with diet and excercise at least 30 minutes 3-4 times a week. Check blood pressures at different times on different days. Can purchase own blood pressure monitor. If not, check at local pharmacy. Bake foods more and grill occasionally. Avoid fried foods. No salt. Use other seasonings.         Idiopathic gout, unspecified chronicity, unspecified site Check blood.        History of thyroid cancer Check blood. Check US.        Hypokalemia Check blood.        Implantable defibrillator reprogramming/check     Left bundle branch block (LBBB) Stable.        Migraine without aura and without status migrainosus, not intractable Improved.        Mixed hyperlipidemia Check blood.        Multiple thyroid nodules Check US and blood.        Nonischemic cardiomyopathy (HCC) Stable.        Nonrheumatic mitral valve regurgitation FU cards.        Obstructive sleep apnea syndrome Never got supplies. Stick to a sleep schedule. Keep your bedtime and wake time consistent from day to day, including on weekends.   Stay active. Regular activity helps promote a good night's sleep. Schedule exercise at least a few hours before bedtime and avoid stimulating activities before bedtime.   Check your medications. If you take  medications regularly, check with your doctor to see if they may be contributing to your insomnia. Also check the labels of OTC products to see if they contain caffeine or other stimulants, such as pseudoephedrine.   Avoid or limit naps. Naps can make it harder to fall asleep at night. If you can't get by without one, try to limit a nap to no more than 30 minutes and don't nap after 3 p.m.   Avoid or limit caffeine and alcohol and don't use nicotine. All of these can make it harder to sleep, and effects can last for several hours.   Avoid large meals and beverages before bed. A light snack is fine and may help avoid heartburn. Drink less liquid before bedtime so that you won't have to urinate as often.  At bedtime:  Try melatonin 10 mg 30 minutes before bed.  Make your bedroom comfortable for sleep. Only use your bedroom for sex or sleep. Keep it dark and quiet, at a comfortable temperature. Hide all clocks in your bedroom, including your wristwatch and cellphone, so you don't worry about what time it is.   Find ways to relax. Try to put your worries and planning aside when you get into bed. A warm bath or a massage before bedtime can help prepare you for sleep. Create a relaxing bedtime ritual, such as taking a hot bath, reading, soft music, breathing exercises, yoga or prayer.   Avoid trying too hard to sleep. The harder you try, the more awake you'll become. Read in another room until you become very drowsy, then go to bed to sleep. Don't go to bed too early, before you're sleepy.   Get out of bed when you're not sleeping. Sleep as much as you need to feel rested, and then get out of bed. Don't stay in bed if you're not sleeping.      Other insomnia DW pt. of options. Try lyrica 75 mg at night. Discussed with patient of side effects and use of these medications.         Pulmonary hypertension (HCC) FU cards. FU respiratory supplier.        S/P biventricular cardiac pacemaker procedure       Slow transit  constipation Stable.  Add Benefiber 1 tablespoon a day and MiraLAX if no bowel movement after 2 -3  days.        Uncontrolled type 2 diabetes mellitus with hyperglycemia (HCC) ? Control. Check urine and blood. Careful with diet and excercise at least 30 minutes 3-4 times a week. Check sugars at different times on different dates. Careful with low sugars. Carry something with you and check sugar if can. Can carry christel cracker, etc. Decrease carbohydrates. But also, careful with fruits and natural sugars.One serving a day and no more than 1 handful every day. Check feet  every AM and careful with sores and ulcers on feet bilaterally. Check eyes every year with dilated eye exam.  Check sugars.  2-hour postmeal should be less than 140s.  Pre-meal should be 's.  Both equally affected A1c.  Discussed importance of glycemic control to prevent complications of diabetes  -Discussed complications of diabetes include retinopathy, neuropathy, nephropathy and cardiovascular disease  -Discussed ABCs of DM  -Discussed importance of SBGM  -Discussed importance of low CHO diet, recommend 45gm per meal or 135gm per day  -abNormal foot exam. Wear shoes or sandals in the house at all times.  Good comfortable shoes or sandals that have a strap so stay on the feet.  Check feet every morning watch for signs and symptoms of infection i.e. sores drainage redness pain etc.  If lotion feet, lotion on the top and bottom and not between the toes.   -Follow up with optho       Vitamin D deficiency Check blood.        Vaccine counseling Holding shingrix.  Recommend shingles vaccine.  There is 2 doses of the vaccine  by 2 months 6 months apart.  Check on insurance coverage on shingles vaccine.  May be covered better at the pharmacy.  Separate shingles vaccine from all of the vaccines by at least 1 to 2 weeks.  Discussed about side effects of vaccine. Recc. Tdap. Check on insurance coverage. Holding covid booster and RSV and Tdap.         Encounter for annual health examination Check urine.         Orders Placed This Encounter   Procedures    Vitamin B12    TSH W Reflex To Free T4    Free T4, (Free Thyroxine)    Microalb/Creat Ratio, Random Urine    CBC With Differential With Platelet    Uric Acid    URINALYSIS, AUTO, W/O SCOPE       Meds This Visit:  Requested Prescriptions     Signed Prescriptions Disp Refills    pregabalin 75 MG Oral Cap 30 capsule 1     Sig: Take 1 capsule (75 mg total) by mouth 2 (two) times daily.       Imaging & Referrals:  DME - EXTERNAL       RTC 3 months extended.     1. GARY positive (Primary)  2. Aortic valve disorder  3. B12 deficiency  -     Vitamin B12  4. Stage 3a chronic kidney disease (HCC)  -     Microalb/Creat Ratio, Random Urine  -     CBC With Differential With Platelet  5. 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  Dispense: 30 capsule; Refill: 1  6. Dilated cardiomyopathy (HCC)  Overview:  Patient was in the hospital with new onset heart failure. And a nonischemic cardiomyopathy ejection fraction 25 to 30% at least moderate mitral regurgitation. She also had moderate to severe pulmonary hypertension. She has been seeing Corinne in the heart failure center over the last couple months. She is no longer on diuretics. She is wearing a LifeVest.  HFrEF  biv icd 6/2020  HTN  HL  Mild pHTN  CARDIAC TESTING:(tracings and images viewed by myself)   EKG bpm LBBB, qrs 171 msec  Nuclear PET 1.Stress EKG is non-diagnostic due to ventricular paced rhythm2.No chest pain3.No arrhythmias1.This is an abnormal perfusion study. 2.Medium reversible perfusion abnormality of mild intensity in the apical anterior and anterolateral segment. 3.The left ventricular cavity is noted to be mildly enlarged on the stress studies. The stress left ventricular ejection fraction was calculated to be 33% and left ventricular  global function is moderately reduced. The rest left ventricular cavity is noted to be mildly enlarged. The rest left ventricular ejection fraction was calculated to be 32% and rest left ventricular global function is moderately reduced. 4.This scan is suggestive of low to moderate risk for future cardiovascular events. 5.The study quality is average. 9/5/2023 9:30:00 AM Gildardo Barros MD   Trans Thoracic Echocardiogram 1.The study quality is average. 2.The left ventricle is normal in size. Global left ventricular systolic function is normal. . The left ventricular ejection fraction is 56%. Left ventricular diastolic function is indeterminate.3.The right ventricle is normal in size. Right ventricular systolic function is normal.4.Mild calcification of the aortic valve is noted with adequate cuspal excursion. Mild aortic regurgitation.5.Mild mitral regurgitation.6.Mild tricuspid regurgitation. 12/6/2023 2:30:00 PM Doc Jimenez MD     ECHO 2/4/20 EF 20-25%, no WMA, Grade 3 DD. Mild AI, severe MR, LA mildl dilated, PAP 40 mmHg  RHC 2/2020 Mean RA 13, PA 62/35, wedge 24, LVEDP 33.  CATH 2/2020 Coronaries normal  ECHO 5/2020 = EF 25%, mr mod, lae mild  biv icd f/u 1/2021 est nubia 2028, bivp99, no ams, no vt  ASSESSMENT AND PLAN:   1. HFrEF  ACC/AHA Stage C; NYHA III  GDMT/ Heart Failure Medications: metoprolol succinate, entresto, asa   Devices: LIFEVEST-has not been wearing as she has not been able to get battery to work      7. Elevated alkaline phosphatase measurement  8. Essential hypertension  9. Idiopathic gout, unspecified chronicity, unspecified site  -     Uric Acid  10. History of thyroid cancer  -     TSH W Reflex To Free T4  -     Free T4, (Free Thyroxine)  11. Hypokalemia  12. Implantable defibrillator reprogramming/check  Overview:  HFrEF  biv icd 6/2020  HTN  HL  Mild pHTN  CARDIAC TESTING:(tracings and images viewed by myself)   EKG bpm LBBB, qrs 171 msec  ECHO 2/4/20 EF 20-25%, no WMA, Grade 3 DD. Mild AI,  severe MR, LA mildl dilated, PAP 40 mmHg  RHC 2/2020 Mean RA 13, PA 62/35, wedge 24, LVEDP 33.  CATH 2/2020 Coronaries normal  ECHO 5/2020 = EF 25%, mr mod, lae mild  biv icd f/u 1/2021 est nubia 2028, bivp99, no ams, no vt  ASSESSMENT AND PLAN:   1. HFrEF  ACC/AHA Stage C; NYHA III  GDMT/ Heart Failure Medications: metoprolol succinate, entresto, asa   Devices: LIFEVEST-has not been wearing as she has not been able to get battery to work    13. Left bundle branch block (LBBB)  14. Migraine without aura and without status migrainosus, not intractable  15. Mixed hyperlipidemia  16. Multiple thyroid nodules  Overview:  Overview:   Dr Chavarria  17. Nonischemic cardiomyopathy (HCC)  Overview:  2-5-20 cath.:   SUMMARY:    1.       Moderate to severe pulmonary hypertension.    2.       Severe elevation of left ventricular filling pressures.    3.       Severe global left ventricular hypokinesis.  LVEF 25%.   4.       Normal coronary arteries.  Echo: moderate MR.   Hospitalized 2/4-6 for shortness of breath, wheezing and tachypnea. O2 sat 80%. Echo showed EF 20-25%, no WMA, Grade 3 DD. Diuresed with IV lasix. RHC 2/5/20 Mean RA 13, PA 62/35, wedge 24, LVEDP 33. Left ventricle, hypertrophied, moderate to severe global hypokinesis, estimated ejection fraction 25 to 30%. At least moderate MR. Coronaries normal. Treated with IV Lasix. Transitioned to Entresto and discharged on Lifevest.   Readmitted 2/10-13 for weakness and frequent falls  EKG bpm LBBB, qrs 171 msec  ECHO 2/4/20 EF 20-25%, no WMA, Grade 3 DD. Mild AI, severe MR, LA mildl dilated, PAP 40 mmHg  RHC 2/2020 Mean RA 13, PA 62/35, wedge 24, LVEDP 33.  She is wearing the lifevest. Despite optimal medicines for >90 days her EF is still 25% and NYHA III with LBBB, qrs 171 msec.  6-15-20: Biv ICD implant     9-15-21 ECHO: 1. Left ventricle: The cavity size was normal. Systolic function      was mildly reduced. The estimated ejection fraction was 45%, by      visual  assessment. Doppler parameters are consistent with      abnormal left ventricular relaxation (grade 1 diastolic      dysfunction).   2. Regional wall motion abnormality: Hypokinesis of the basal      anteroseptal, basal inferoseptal, and basal anterolateral      myocardium.   3. Aortic valve: Mild regurgitation.   4. Mitral valve: No regurgitation.   Compared to the previous study these findings represent   improvement. Ejection fraction has improved from 25% on 2020 study.   Transthoracic echo done September 5, 2023 showed normal LV wall thickness.  Regional wall motion shows hypokinesis of basal anterior septal segment and mid anterior septal segment.  Global LVEF is mildly decreased at 40%.  LV diastolic function is indeterminate.  Mild MR mild TR mild FL.  Calcification of the aortic valve is mild with adequate cuspal exertion.  Mild AR.  RV is normal in size and RV function is normal.  Nuclear PET 1.Stress EKG is non-diagnostic due to ventricular paced rhythm2.No chest pain3.No arrhythmias1.This is an abnormal perfusion study. 2.Medium reversible perfusion abnormality of mild intensity in the apical anterior and anterolateral segment. 3.The left ventricular cavity is noted to be mildly enlarged on the stress studies. The stress left ventricular ejection fraction was calculated to be 33% and left ventricular global function is moderately reduced. The rest left ventricular cavity is noted to be mildly enlarged. The rest left ventricular ejection fraction was calculated to be 32% and rest left ventricular global function is moderately reduced. 4.This scan is suggestive of low to moderate risk for future cardiovascular events. 5.The study quality is average. 9/5/2023 9:30:00 AM Gildardo Barros MD   Trans Thoracic Echocardiogram 1.The study quality is average. 2.The left ventricle is normal in size. Global left ventricular systolic function is normal. . The left ventricular ejection fraction is 56%. Left ventricular  diastolic function is indeterminate.3.The right ventricle is normal in size. Right ventricular systolic function is normal.4.Mild calcification of the aortic valve is noted with adequate cuspal excursion. Mild aortic regurgitation.5.Mild mitral regurgitation.6.Mild tricuspid regurgitation. 12/6/2023 2:30:00 PM Doc Jimenez MD     18. Nonrheumatic mitral valve regurgitation  19. Obstructive sleep apnea syndrome  Overview:  IMPRESSIONS: This study demonstrates severe obstructive sleep apnea. Severe sleep architecture fragmentation was identified.   Diagnosis: Obstructive Sleep Apnea G47.33   RECOMMENDATIONS:   1. It is recommended to proceed with CPAP titration.   2. The patient should avoid alcohol and sedative medications, as these may worsen severity of symptoms.   3. The patient should avoid drowsy driving.   4. Patient to follow up with the referring physician.      Monitoring revealed resolution of respiratory events with CPAP at 16 cm H2O. Supine REM was not observed at the optimal pressure. The Apnea/Hypopnea Index (AHI) was 15 at optimal pressure setting. The central sleep apnea index was 0. The oxygen lynette was 67.0% and the patient spent 2.7% of sleep time spent with oxygen levels below 88%.   Snoring Profile: Snoring was resolved at the final pressure setting.   Cardiac Profile: Electrocardiogram demonstrated normal sinus rhythm.   EEG Profile: There was no evidence of epiliptiform activity during sleep. There were 16 spontaneous arousals, with a total spontaneous arousal index of 7.7.   Periodic Limb Movements: PLM index of 0. PLM Arousal Index of 0.   IMPRESSIONS: This study demonstrates successful CPAP titration.   Diagnosis: Obstructive Sleep Apnea G47.33   RECOMMENDATIONS:   1. The patient should be prescribed CPAP at 16 cm H2O, with humidity at 3 and EPR on.   2. The patient was fitted with a [employer] SAMEERA mask, size LARGE.   3. The patient should avoid alcohol and sedative medications, as  these may worsen severity of symptoms.   4. The patient should avoid drowsy driving.   5. Patient may follow up with the referring provider.   Orders:  -     DME - External  20. Other insomnia  21. Pulmonary hypertension (HCC)  22. S/P biventricular cardiac pacemaker procedure  Overview:  6/15/2020  23. Slow transit constipation  24. Uncontrolled type 2 diabetes mellitus with hyperglycemia (HCC)  -     Microalb/Creat Ratio, Random Urine  -     URINALYSIS, AUTO, W/O SCOPE  25. Vitamin D deficiency  26. Vaccine counseling  27. Encounter for annual health examination  28. Papillary thyroid carcinoma (HCC)  29. Insomnia, unspecified type      The patient indicates understanding of these issues and agrees to the plan.  Consult ordered.  Further testing ordered.  Imaging studies ordered.  Lab work ordered.  Reinforced healthy diet, lifestyle, and exercise.      No follow-ups on file.    Advanced Directives:   She does NOT have a Living Will. [Do you have a living will?: No]  She does NOT have a Power of  for Health Care. [Do you have a healthcare power of ?: No]  Discussed Advance Care Planning with patient (and family/surrogate if present). Standard forms made available to patient in After Visit Summary.  16+ minutes was spent with all Advanced Care Planning elements today (up to 30 minutes for CPT 07237)    MD Rozina Seymour's SCREENING SCHEDULE   Tests on this list are recommended by your physician but may not be covered, or covered at this frequency, by your insurer.   Please check with your insurance carrier before scheduling to verify coverage.   PREVENTATIVE SERVICES FREQUENCY &  COVERAGE DETAILS LAST COMPLETION DATE   Diabetes Screening    Fasting Blood Sugar /  Glucose    One screening every 12 months if never tested or if previously tested but not diagnosed with pre-diabetes   One screening every 6 months if diagnosed with pre-diabetes Lab Results   Component Value Date      (H) 11/06/2024        Cardiovascular Disease Screening    Lipid Panel  Cholesterol  Lipoprotein (HDL)  Triglycerides Covered every 5 years for all Medicare beneficiaries without apparent signs or symptoms of cardiovascular disease Lab Results   Component Value Date    CHOLEST 145 11/06/2024    HDL 44 11/06/2024    LDL 69 11/06/2024    TRIG 195 (H) 11/06/2024         Electrocardiogram (EKG)   Covered if needed at Welcome to Medicare, and non-screening if indicated for medical reasons 12/24/2023      Ultrasound Screening for Abdominal Aortic Aneurysm (AAA) Covered once in a lifetime for one of the following risk factors    Men who are 65-75 years old and have ever smoked    Anyone with a family history -     Colorectal Cancer Screening  Covered for ages 50-85; only need ONE of the following:    Colonoscopy   Covered every 10 years    Covered every 2 years if patient is at high risk or previous colonoscopy was abnormal 02/19/2016    No recommendations at this time    Flexible Sigmoidoscopy   Covered every 4 years -    Fecal Occult Blood Test Covered annually -   Bone Density Screening    Bone density screening    Covered every 2 years after age 65 if diagnosed with risk of osteoporosis or estrogen deficiency.    Covered yearly for long-term glucocorticoid medication use (Steroids) Last Dexa Scan:    XR DEXA BONE DENSITOMETRY (CPT=77080) 08/15/2016      No recommendations at this time   Pap and Pelvic    Pap   Covered every 2 years for women at normal risk; Annually if at high risk -  No recommendations at this time    Chlamydia Annually if high risk -  No recommendations at this time   Screening Mammogram    Mammogram     Recommend annually for all female patients aged 40 and older    One baseline mammogram covered for patients aged 35-39 -    No recommendations at this time    Immunizations    Influenza Covered once per flu season  Please get every year 10/28/2024  No recommendations at this time     Pneumococcal Each vaccine ([id number] & [id number]) covered once after 65 Prevnar 13: 01/19/2016    [id number]: 03/12/2018     No recommendations at this time    Hepatitis B One screening covered for patients with certain risk factors   -  No recommendations at this time    Tetanus Toxoid Not covered by Medicare Part B unless medically necessary (cut with metal); may be covered with your pharmacy prescription benefits -    Tetanus, Diptheria and Pertusis TD and TDaP Not covered by Medicare Part B -  No recommendations at this time    Zoster Not covered by Medicare Part B; may be covered with your pharmacy  prescription benefits -  Zoster Vaccines(1 of 2) Never done     Diabetes      Hemoglobin A1C Annually; if last result is elevated, may be asked to retest more frequently.    Medicare covers every 3 months Lab Results   Component Value Date     (H) 11/06/2024    A1C 6.8 (H) 11/06/2024       Hemoglobin A1C Test due on 05/06/2025    Creat/alb ratio Annually Lab Results   Component Value Date    MICROALBCREA 2.6 02/06/2024       LDL Annually Lab Results   Component Value Date    LDL 69 11/06/2024       Dilated Eye Exam Annually Last Diabetic Eye Exam:  No data recorded  No data recorded       Annual Monitoring of Persistent Medications (ACE/ARB, digoxin diuretics, anticonvulsants)    Potassium Annually Lab Results   Component Value Date    K 3.9 11/06/2024         Creatinine   Annually Lab Results   Component Value Date    CREATSERUM 1.18 (H) 11/06/2024         BUN Annually Lab Results   Component Value Date    BUN 11 11/06/2024       Drug Serum Conc Annually No results found for: \"DIGOXIN\", \"DIG\", \"VALP\"                     Understanding Advance Care Planning  Advance care planning is the process of deciding one’s own future medical care. It helps assure that if you can’t speak for yourself, your wishes can still be carried out. The plan is a series of legal documents that note a person’s wishes. The  documents vary by state. Advance care planning should be discussed at a regular office visit with your primary care provider before an acute illness. Advance care planning is encouraged when a person has a serious illness that is expected to get worse. It may also be done before major surgery. And it can help you and your family be prepared in case of a major illness or injury. Advance care planning helps with making decisions at these times.     Who will speak on your behalf?  A healthcare proxy is a person who acts as the voice of a patient when the patient can’t speak for himself or herself. The name of this role varies by state. It may be called a Durable Medical Power of  or Durable Power of  for Healthcare. It may be called an agent, surrogate, or advocate. Or it may be called a representative or decision maker. It's an official duty that is identified by a legal document. The document also varies by state.   Why is advance care planning important?   If a person communicates his or her healthcare wishes:   He or she will be given medical care that matches his or her values and goals.  Family members will not be forced to make decisions in a crisis with no guidance.  Creating a plan  Making an advance care plan is often done in 3 steps:   Thinking about one’s wishes. To create an advance care plan, think about what kind of medical treatment you would want if you lose the ability to communicate. Are there any situations in which you would refuse or stop treatment? Are there therapies you would want or not want? And whom do you want to make decisions for you? There are many places to learn more about how to plan for your care. Ask your healthcare provider or  for resources.  Picking a healthcare proxy. This means choosing a trusted person to speak for you only when you can’t speak for yourself. When you can't make medical decisions, your proxy makes sure the instructions in your  advance care plan are followed. A proxy doesn't make decisions based on his or her own opinions. They must put aside those opinions and values if needed, and carry out your wishes.  Filling out the legal documents. There are several kinds of legal documents for advance care planning. Each one tells healthcare providers your wishes. The documents may vary by state. They must be signed and may need to be witnessed or notarized. You can cancel or change them whenever you wish. Depending on your state, the documents may include a Healthcare Proxy form, Living Will, Durable Medical Power of , and Advance Directive.  The family’s role  The best help a family can give is to support their loved one’s wishes. Open and honest communication is vital. Family should express any concerns they have about the patient’s choices while the patient can still make decisions in the event that his or her illness prevents communicating those wishes at a later time.    [employer] last reviewed this educational content on 12/1/2019    © 9877-8434 [employer]. All rights reserved. This information is not intended as a substitute for professional medical care. Always follow your healthcare professional's instructions.          Advance Medical Directive  An advance medical directive is a form that lets you plan ahead for the care you’d want if you could no longer express your wishes. This statement outlines the medical treatment you’d want or names the person you’d wish to make healthcare decisions for you. Be aware that laws vary from state to state, and it may be worthwhile to talk with an .     Writing down your wishes  An advance directive is important whether you’re young or old. Injury or illness can strike at any age.  Decide what is important to you and the kind of treatment you’d want, or not want to have.  Some states allow only one kind of advance directive. Some let you do both a durable power of   for healthcare and a living will. Some states put both kinds on the same form.    A durable power of  (POA) for healthcare   This form lets you name someone else that you have chosen and trust to speak and make decisions on your behalf.  This person can decide on treatment for you only when you can’t speak for yourself.  You don't need to be at the end of your life. They could speak for you if you were in a coma but were likely to recover.    A living will  This form lets you list the care you want at the end of your life.  A living will applies only if you won’t live without medical treatment. It would apply if you had advanced cancer, a massive stroke, or other serious illness from which you will not recover.  It takes effect only when you can no longer express your wishes yourself.    [employer] last reviewed this educational content on 2/1/2021 © 2000-2021 [employer]. All rights reserved. This information is not intended as a substitute for professional medical care. Always follow your healthcare professional's instructions.          Choosing an Agent  A durable power of  for healthcare is only as good as the person you name to be your agent. Your agent is the person you have chosen to speak and make decisions on your behalf. If this person knows your treatment wishes and is willing to carry them out, you’ll probably be well represented. Be sure to tell your agent what’s important to you.     Who to choose  Here are suggestions for choosing an agent:  You can name a family member, close friend, , , or rabbi.  You should name one person as your agent. Then name one or two alternates. You need a backup person in case your first choice can’t be reached when needed.  Talk with each person you're thinking of naming as your agent or alternate. Do this before you decide who should carry out your wishes.  Your agent should be ...  A competent adult, age 18 or older  Someone  you trust and can talk to about the care you want and what's important to you  Someone who supports your treatment choices    In many states, your agent can’t be ...   Your healthcare provider  An employee of your provider or of a hospital, nursing home, or hospice program where you receive care  Some states have other restrictions on who can be named as an agent for an advance directive.   Be prepared  Tip: It's a good idea to write down your wishes and give a copy to your agent and all others who are involved with your healthcare.   [employer] last reviewed this educational content on 8/1/2021    © 6310-1467 [employer]. All rights reserved. This information is not intended as a substitute for professional medical care. Always follow your healthcare professional's instructions.          Understanding DNR Orders  Do not resuscitate (DNR) orders tell hospital staff not to do potentially life-restoring measures, such as CPR, if you or your loved one's heart and lungs stop working. It allows a natural death, and prevents healthcare staff from artificially reviving a person and placing them on life support. In many states, a DNR order also applies to staff outside the hospital such as in nursing homes and emergency medical services. A DNR order must be written by a healthcare provider. Or in some cases, certain other healthcare workers write it. This can only be done with the person’s or family’s consent. If a person has not written an advance directive, their family will decide on a DNR with the help of the healthcare team.   The person can cancel a DNR order at any time. The healthcare team can answer questions about the DNR form. Have copies of a DNR form are readily available so that your wishes can be faithfully followed.   Writing a DNR order  When might a DNR order be written? When the person’s health condition is such that, in the case of cardiac arrest, CPR and other resuscitation methods are not  desired. This could be because the chance of successful resuscitation is very low. Or it could be because the care plan now focuses on comfort measures instead of life-sustaining measures. Coma and terminal illness are instances when a DNR order might be used.   Irreversible coma  In a coma, a person does not respond to sight, sound, or touch. The heart and lungs could be working, but brain function is damaged due to trauma or disease.   Terminal illness  In the last stages of heart disease, AIDS, cancer, and other illnesses, some people don’t want to prolong their suffering. If recovery isn’t likely and quality of life is poor or getting worse, a person or their family may agree to a DNR order.   DNR orders and hospice care  A hospice program can offer care during the final weeks of life. Hospice programs provide dignity, pain control and comfort care in the home or at special facilities. Hospice does not provide aggressive treatment. In fact, a DNR order will likely be discussed before a person is admitted to hospice. A  or  may be able to help you arrange for hospice support.   Documenting end-of-life wishes  In addition to DNR orders, a person with a serious, life-limiting illness may wish to document their treatment wishes. This is called a POST form or by different names, depending on the state. It's meant to provide a portable medical order to help guide healthcare providers on the specific medical treatments a person wants during a medical emergency. It's meant to complement a DNR order, not replace it. Your healthcare provider can tell you more and help you complete the forms. The form may be called one of these:   MOLST (medical orders for life-sustaining treatment)  POLST (physician orders for life-sustaining treatment)  MOST (medical orders for scope of treatment)  POST (physician orders for scope of treatment)  TPOPP (transportable physician orders for patient  preferences)  [employer] last reviewed this educational content on 2021 [employer]. All rights reserved. This information is not intended as a substitute for professional medical care. Always follow your healthcare professional's instructions.          An Agent’s Role for Durable Power of  for Health Care   It’s impossible to know which medical treatment choices you might face in the future. What if you aren't able to make these decisions for yourself? A durable power of  for healthcare lets you name an agent to speak and carry out your wishes on your behalf. This happens only if you can’t express your wishes yourself.     An agent’s duty  Your agent respects your wishes in the following ways:    Your agent’s duty is to see that your wishes are followed.  If your wishes aren’t known, your agent should try to decide what you want.  Your agent’s choices come before anyone else’s wishes for you.  A durable power of  for healthcare doesn't not give your agent control over your money . Your agent also can’t be made to pay your bills.  Find out what your agent can do  Restrictions on what an agent can and can’t do vary by state. Check your state laws. In most states your agent can:   Choose or refuse life-sustaining and other medical treatment on your behalf  Consent to treatment, and then stop treatment if your condition doesn’t improve  Access and release your medical records  Request an autopsy and donate your organs, unless you’ve stated otherwise in your advance directive  Find out whether your state allows your agent to do the following:   Refuse or withdraw life-enhancing care  Refuse or stop tube feeding or other life-sustaining care--even if you haven’t stated on your advance directive that you don’t want these treatments  Order sterilization or   [employer] last reviewed this educational content on 2021 [employer].  All rights reserved. This information is not intended as a substitute for professional medical care. Always follow your healthcare professional's instructions.          Being a Healthcare Proxy  A healthcare proxy is someone who represents a person who can’t speak for themself. The name of this role varies by state. It may be called a Durable Medical Power of . It may be called a Durable Power of  for Healthcare. It may be called an agent, surrogate, or advocate. Or it may be called a representative or decision maker. It's an official duty that is noted by a legal document. The document also varies by state. The person must name you as his or her proxy on the document.      A healthcare proxy speaks for another person when he or she is not able to do so. The proxy helps make sure the person’s healthcare wishes are known and followed.     What it means to be a healthcare proxy   Your role as healthcare proxy starts when the person can’t make medical decisions. This assessment can only be made by a licensed doctor. You then make the healthcare decisions as needed. You do this by carrying out the person’s wishes. These wishes are noted in his or her advance care planning documents. These declare what kind of treatment the person wishes to have or not have. You may need to put aside your own values and opinions to carry out the person’s wishes. This may include refusing or stopping life-sustaining treatments.   Documenting end-of-life wishes   As a healthcare proxy, encourage the person to discuss his or her wishes, while they are able. They can do this with their healthcare provider and then document the wishes as a medical order. The provider can help the person complete the form. The forms are known by different names depending on the state. The form may be called one of these:     MOLST (medical orders for life-sustaining treatment)  POLST (physician orders for life-sustaining treatment)  MOST  (medical orders for scope of treatment)  POST (physician orders for scope of treatment)  TPOPP (transportable physician orders for patient preferences)    The form documents the person’s wishes at the end of life. It's not tied to a certain healthcare provider or facility. It's different than a living will. The form is an order written according to state regulations by a healthcare provider. To complete one, the person must express his or her wishes to an advanced healthcare provider. If the person can’t make his or her own decisions, then this is done by the person’s healthcare proxy.   Carrying out your role  Your duties depend on what the person’s advance care planning documents say. Your duties may also depend on state law. In general:   Before accepting a role as a proxy, talk with the person. Be sure you know his or her wishes. Ask questions. This will help you be his or her voice if and when it is needed.  Be sure that the person’s healthcare team knows that you are his or her proxy. Carry a copy of the document and proof of your identity.  Make sure the healthcare team has a copy of the advance care planning documents.  Talk to the healthcare team. Ask questions as often as you need. Stay informed about the person’s condition.  Ask for any help you need to understand the medical situation. Ask about the person’s condition and prognosis. Ask about risks and benefits of tests and treatments. Find out all the facts and options.  Speak on the person’s behalf with the healthcare team when needed.  Talk with family members and keep them informed.  Know your rights. You have the right to ask for information. You can ask for consultations and second opinions. You have the right to request or refuse treatment for the person. You may be able to review his or her medical chart. You can authorize the person’s transfer to another facility. You can also request a new healthcare provider for him or her. If you are not sure  what your rights are at any time, ask a .  When it’s time to make decisions  If the person’s wishes are clear in the advance care plan documents, ask for them to be carried out as noted. If they are not clear, talk with the healthcare team. Listen to the team’s recommendations. Talk with a  or counselor. It may be hard for you to make a decision at times. You may feel sad or upset about a decision. Being a healthcare proxy is not an easy role. But it's an important one. Remember that the person trusts you to carry out his or her wishes.   If you need help  Ask the healthcare team if you have trouble with a decision. The healthcare team will help you.  Encourage the person you are helping to have a conversation with their provider about their end-of-life wishes. The provider can help them fill out the form.  You may need help in resolving family conflicts. Ask the hospital or clinic , ethics consultant, or a  for help.  If you are having trouble talking with the healthcare team while the person is in the hospital, reach out to the patient relations department. Or ask to speak to the hospital ombudsman or ethics committee.    [employer] last reviewed this educational content on 9/1/2019    © 7916-8103 [employer]. All rights reserved. This information is not intended as a substitute for professional medical care. Always follow your healthcare professional's instructions.          Life Support  If you understand how specific treatments may affect your quality of life, you can decide which ones you’d choose or refuse. You may want to talk to your healthcare provider about the possible benefits and risks of treatments. The chance of good results from these therapies varies based on each individual clinical situation and can be very hard to predict. Medical treatment, if your life is in danger, falls into 3 main categories.     Life supporting  This  care keeps your heart and lungs going when they can no longer work on their own.  CPR restarts your heart and lungs if they stop working.  A respirator (or ventilator) keeps you breathing. Air is pumped into your lungs through a tube that’s put into your windpipe.    Life sustaining  This care keeps you alive longer when you have an illness that can’t be cured.  Tube feeding or TPN (total parenteral nutrition) provides food and fluids through a tube or IV (intravenous). It is given if you can’t chew or swallow on your own.  Dialysis is a kidney machine that cleans your blood when your kidneys can no longer work on their own.    Life enhancing  This care controls pain and discomfort, such as nausea or trouble breathing. This type of care is not designed to prolong your life, but to enhance comfort and quality of life. Nothing is done to keep you alive longer.  Hospice care is comfort care. It might provide food and fluids by mouth or help with bathing. Hospice care is given during the last stages of a terminal illness.  Strong pain medicine can be given to help keep you comfortable.    Do Not Resuscitate (DNR)  Would you want CPR if your heart stops while you’re a patient in a hospital or nursing home? If not, talk to your healthcare provider about issuing a DNR (Do-Not-Resuscitate) order.   DNRs and advance directives may not apply during anesthesia, in emergency rooms, or when emergency medical teams respond to a 911 call. Ask your healthcare provider how you can make sure your wishes will be followed. Also, a DNR will not prevent you from getting other kinds of needed medical care such as treatment for pain, or bleeding.   [employer] last reviewed this educational content on 8/1/2021 © 2000-2021 [employer]. All rights reserved. This information is not intended as a substitute for professional medical care. Always follow your healthcare professional's instructions.          Stopping Life-Sustaining  Treatments  Certain treatments can help sustain life when you have a serious illness. But as your illness progresses, there may come a time when these treatments are no longer a benefit. Decisions must then be made whether to continue or stop these treatments. This can be a hard task for you and your loved ones, but know that you’re not alone. Your healthcare provider and healthcare team will guide you through these treatment decisions. They will also help answer any questions you may have.     What are life-sustaining treatments?  Life-sustaining treatments help keep you alive if a vital body function fails. These treatments can include CPR and the use of machines to help with heart, lung, or kidney function. They can also include the use of tubes to deliver food, fluids, blood, and medicines to the body. Blood transfusions and antibiotics are also types of life-sustaining treatments.   What happens if life-sustaining treatments are continued?  These treatments can help extend your life. But they will not cure your illness. If you are near the end of your life, you may find it hard to handle the side effects and problems that can occur with these treatments. In this case, the treatments may be too much of a burden on your body. They may cause more harm than good. They may also disrupt the natural dying process and prolong suffering.   What happens if life-sustaining treatments are stopped?  If these treatments are stopped, the focus of treatment will shift to comfort care. This involves measures to control pain and other symptoms you may have. These measures are not meant to cure your illness or help you live longer. Instead, they are meant to improve your quality of life during the time you have left. If you are in the end stage of your illness, you may be referred to hospice by your healthcare provider. Hospice provides end-of-life care. This includes emotional, spiritual, and social support for you and your  loved ones.   How do I decide whether to stop life-sustaining treatments?  Your healthcare provider and healthcare team will talk with you about the specific treatments that are part of your care plan. If you want, you may include family and friends in these meetings. Here are some questions to think about or ask your healthcare team:   Is there is a chance that my illness will improve? Or will it continue to get worse?  What are my goals of care? Do I want to extend the time I have left? Or do I want to focus my care on comfort and managing symptoms?  How will stopping or continuing these treatments affect my health? Will they enhance my comfort and quality of life? Or will they cause more problems?  Consider your own values or serge. Also ask for advice from those who share your values.  How do I state my decisions about life-sustaining treatments?  Once you’ve made your decision to continue or stop specific treatments, you can tell your healthcare provider directly. It's best to also put your treatment wishes in writing with advance directives. These are legal forms related to healthcare decisions. Laws about advance directives vary from state to state. Ask your healthcare provider about what forms are needed to make sure your wishes will be followed. Some common forms include:   A durable power of  for healthcare or a healthcare proxy form.  This form lets you name a person to make treatment decisions for you when you can’t. This person is often called a healthcare proxy, medical or healthcare power of , or agent.  A living will.  This form tells others the kinds of treatment you want or don’t want if you become too ill or injured to speak for yourself.  Orders for life-sustaining treatments.  These are actual healthcare provider's orders that must be followed by other medical providers. The form belongs to you, not to the healthcare provider or hospital.). These are legal forms obtained from  your healthcare provider or hospital that document your wishes. The forms are known by different names depending on the state. Common names include:  MOLST (medical orders for life-sustaining treatment)  POLST (physician orders for life-sustaining treatment)  MOST (medical orders for scope of treatment)  POST (physician orders for scope of treatment)  TPOPP (transportable physician orders for patient preferences)     Keep in mind that you can change or cancel an advance directive at any time. Make it a practice to review your decisions each time there is a change in your health or goals of care. Also be sure to tell your healthcare proxy and loved ones of any changes in your decisions.   Deciding whether to stop life-sustaining treatments for a loved one   The decision to stop treatment ideally is made with the person’s consent. If the person is not capable of making decisions and has no advance directive, the decision falls to the person’s healthcare proxy or other adult. If you need to make a decision about stopping treatment for a loved one, start by talking to his or her healthcare provider. Review the goals of care and the benefits and burdens of specific treatments on your loved one’s health. Also think about your loved one’s wishes and values. If needed, seek advice from other healthcare team members, like a  or .   [employer] last reviewed this educational content on 12/1/2019    © 6940-7354 [employer]. All rights reserved. This information is not intended as a substitute for professional medical care. Always follow your healthcare professional's instructions.

## 2025-05-13 ENCOUNTER — PATIENT MESSAGE (OUTPATIENT)
Dept: INTERNAL MEDICINE CLINIC | Facility: CLINIC | Age: 85
End: 2025-05-13

## 2025-05-20 ENCOUNTER — TELEPHONE (OUTPATIENT)
Dept: INTERNAL MEDICINE CLINIC | Facility: CLINIC | Age: 85
End: 2025-05-20

## 2025-05-20 RX ORDER — VALSARTAN 160 MG/1
TABLET ORAL
Qty: 135 TABLET | Refills: 1 | Status: SHIPPED | OUTPATIENT
Start: 2025-05-20

## 2025-05-20 NOTE — TELEPHONE ENCOUNTER
Reached patient for medication adherence consult. Patient overdue for refill on valsartan per insurance report.    Patient reports that she is taking the medication as prescribed. She reports tolerating the medication well. She endorses the need for refill. Will pend order for PCP to review.     Provided education on importance of adherence. Patient denies any questions or concerns with medication.

## 2025-06-02 ENCOUNTER — MED REC SCAN ONLY (OUTPATIENT)
Dept: INTERNAL MEDICINE CLINIC | Facility: CLINIC | Age: 85
End: 2025-06-02

## 2025-06-18 ENCOUNTER — TELEPHONE (OUTPATIENT)
Dept: INTERNAL MEDICINE CLINIC | Facility: CLINIC | Age: 85
End: 2025-06-18

## 2025-06-18 NOTE — TELEPHONE ENCOUNTER
Patient called (identified name and ),   States she never got her CPAP equipment.  Gave her number to call [facility] to get delivery set up.  She stated understanding.    Tiny Aguillon CMA [place]    25 10:10 AM  Note     New CPAP orders placed at 2025 office visit.   Called [facility] and confirmed they have reached out to the patient to arrange delivery 3 times and have not heard back, unable to leave her a voicemail.     Called patient and provided [facility] ph # [phone] and advised to call to arrange delivery of CPAP.   She verbalized understanding with no further questions.

## 2025-08-12 ENCOUNTER — OFFICE VISIT (OUTPATIENT)
Dept: INTERNAL MEDICINE CLINIC | Facility: CLINIC | Age: 85
End: 2025-08-12

## 2025-08-12 VITALS
BODY MASS INDEX: 29.02 KG/M2 | WEIGHT: 170 LBS | TEMPERATURE: 98 F | SYSTOLIC BLOOD PRESSURE: 128 MMHG | DIASTOLIC BLOOD PRESSURE: 72 MMHG | OXYGEN SATURATION: 96 % | HEIGHT: 64 IN | HEART RATE: 80 BPM | RESPIRATION RATE: 16 BRPM

## 2025-08-12 DIAGNOSIS — E11.65 UNCONTROLLED TYPE 2 DIABETES MELLITUS WITH HYPERGLYCEMIA (HCC): ICD-10-CM

## 2025-08-12 DIAGNOSIS — Z85.850 HISTORY OF THYROID CANCER: ICD-10-CM

## 2025-08-12 DIAGNOSIS — I10 ESSENTIAL HYPERTENSION: ICD-10-CM

## 2025-08-12 DIAGNOSIS — Z71.85 VACCINE COUNSELING: ICD-10-CM

## 2025-08-12 DIAGNOSIS — E55.9 VITAMIN D DEFICIENCY: ICD-10-CM

## 2025-08-12 DIAGNOSIS — R74.8 ELEVATED ALKALINE PHOSPHATASE MEASUREMENT: ICD-10-CM

## 2025-08-12 DIAGNOSIS — M51.370 DEGENERATION OF INTERVERTEBRAL DISC OF LUMBOSACRAL REGION WITH DISCOGENIC BACK PAIN: ICD-10-CM

## 2025-08-12 DIAGNOSIS — N18.31 STAGE 3A CHRONIC KIDNEY DISEASE (HCC): Primary | Chronic | ICD-10-CM

## 2025-08-12 DIAGNOSIS — E53.8 B12 DEFICIENCY: ICD-10-CM

## 2025-08-12 DIAGNOSIS — Z78.0 POSTMENOPAUSAL: ICD-10-CM

## 2025-08-12 LAB — VIT D+METAB SERPL-MCNC: 35.8 NG/ML (ref 30–100)

## 2025-08-12 PROCEDURE — 3008F BODY MASS INDEX DOCD: CPT | Performed by: INTERNAL MEDICINE

## 2025-08-12 PROCEDURE — 3078F DIAST BP <80 MM HG: CPT | Performed by: INTERNAL MEDICINE

## 2025-08-12 PROCEDURE — G2211 COMPLEX E/M VISIT ADD ON: HCPCS | Performed by: INTERNAL MEDICINE

## 2025-08-12 PROCEDURE — 99214 OFFICE O/P EST MOD 30 MIN: CPT | Performed by: INTERNAL MEDICINE

## 2025-08-12 PROCEDURE — 1159F MED LIST DOCD IN RCRD: CPT | Performed by: INTERNAL MEDICINE

## 2025-08-12 PROCEDURE — 3074F SYST BP LT 130 MM HG: CPT | Performed by: INTERNAL MEDICINE

## 2025-08-12 PROCEDURE — 1160F RVW MEDS BY RX/DR IN RCRD: CPT | Performed by: INTERNAL MEDICINE

## 2025-08-12 RX ORDER — PREGABALIN 75 MG/1
CAPSULE ORAL
Qty: 30 CAPSULE | Refills: 1 | Status: SHIPPED | OUTPATIENT
Start: 2025-08-12

## (undated) NOTE — MR AVS SNAPSHOT
[address] [facility] [place] [address]  [phone]               Thank you for choosing us for your health care visit with Betina Wall.  Taurus Preston MD.  We are glad to serve you and happy to provide you with this summary of your v Sed Rate, Westergren (Automated) [E]  Vitamin B12    Meds This Visit:  Signed Prescriptions Disp Refills    FLUoxetine HCl 20 MG Oral Cap 30 capsule 2      Sig: Take 1 capsule (20 mg total) by mouth nightly.       hydrochlorothiazide 12.5 MG Oral Cap 90 cap · Tofu: Tofu, which is made from soy, is a good source of protein. Studies have shown that it may be a better choice than meat for people with gout. · Omega fatty acids:  These acids are found in fatty fish (such as salmon), certain oils (such as flax, yordy the joints, causing painful inflammation. This condition is called gout.   If you have gout, you may have crystals of uric acid in your synovial fluid, the substance that surrounds joints to help them move smoothly.   Why do I need this test?  You may need provider inserts a needle into the skin near an inflamed joint and withdraws some of the surrounding fluid into a vial or tube. Does this test pose any risks? Joint aspiration has some minor risks. You may have bleeding in the area around the joint.  Alth Breastfeeding? No              Current Medications          This list is accurate as of: 2/9/17 11:29 AM.  Always use your most recent med list.                aspirin 81 MG Tabs   Take 81 mg by mouth daily.            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

## (undated) NOTE — LETTER
8/1/2024          Rozina Haynes        [address]        [facility] [address]         Dear Rozina,    This letter is to inform you that our office has made several attempts to reach you by phone without success.  We were attempting to contact you by phone.     Please contact our office at the number listed below as soon as you receive this letter to discuss this issue and to make the necessary changes in our system to your contact information.  Thank you for your cooperation.        Sincerely,    Ana Adair MD  [facility] [address]   [phone]        Document electronically generated by:  Fouzia BURGESS RN

## (undated) NOTE — LETTER
[facility], [place]  Authorization for Invasive Procedures  1. I hereby authorize  Dr. Adonis Gaffney  , my physician and whomever may be designated as the doctor's assistant, to perform the following operation and/or procedure:  Insertion performed for the purposes of advancing medicine, science, and/or education, provided my identity is not revealed. If the procedure has been videotaped, the physician/surgeon will obtain the original videotape.  The hospital will not be responsible for stor My signature below affirms that prior to the time of the procedure, I have explained to the patient and/or her legal representative, the risks and benefits involved in the proposed treatment and any reasonable alternative to the proposed treatment.  I have

## (undated) NOTE — MR AVS SNAPSHOT
[address] [facility] [address]  [phone]               Thank you for choosing us for your health care visit with Miguel Angel Beard.  Amanda Camarillo MD.  We are glad to serve you and happy to provide you with this summary of your v Scheduling Instructions     Monday April 10, 2017     Imaging:  XR CHEST PA + LAT CHEST ([id number]=[id number])    Instructions: To schedule a test at any [facility] Scheduling at   [phone].          Reason for Today's Vi begin tamiflu 75 every 12 hrs. For 5 days. Discussed with patient of side effects and use of these medications. Dizziness. Check orthostatics. Get up slowly from bed.        Orders Placed This Encounter  CBC W Differential W Platelet [E]  Comp Metabolic Commonly known as:  LEXAPRO           hydrochlorothiazide 12.5 MG Caps   TAKE ONE CAPSULE BY MOUTH EACH MORNING   Commonly known as:  MICROZIDE           Levothyroxine Sodium 125 MCG Tabs   Take 1 tablet (125 mcg total) by mouth before breakfast.   Commonl Modification Recommendation   Weight Reduction Maintain normal body weight (body mass index 18.5-24.9 kg/m2)   DASH eating plan Adopt a diet rich in fruits, vegetables, and low fat dairy products with reduced content of saturated and total fat.    Dietary s Don’t forget strength training with weights and resistance Set goals and track your progress   You don’t need to join a gym. Home exercises work great.  Put more priority on exercise in your life                    Visit [facility] online at

## (undated) NOTE — MR AVS SNAPSHOT
[address] [facility] [address]  [phone]               Thank you for choosing us for your health care visit with John Chandler MD.  We are glad to serve you and happy to provide you with this summary of your visi have any questions related to insurance coverage.          Referral Details     Referred By    Referred To    MD Kain Crowell [address] [place] [address]   Phone:  [phone]    Diagnoses:  Ataxia   Order:  Neuro - Internal    Padilla Phan MD or be assured that none are required. You can then schedule your appointment. Failure to obtain required authorization numbers can create reimbursement difficulties for you.     Assoc Dx:  Ataxia [R27.0]          Reason for Today's Visit     Fall Prochlorperazine Maleate 10 mg tablet   Take 1 tablet (10 mg total) by mouth every 8 (eight) hours as needed for Nausea. Commonly known as:  COMPAZINE           simvastatin 20 MG Tabs   Take 1 tablet (20 mg total) by mouth nightly.    Commonly known as: